# Patient Record
Sex: MALE | Race: WHITE | NOT HISPANIC OR LATINO | Employment: OTHER | URBAN - METROPOLITAN AREA
[De-identification: names, ages, dates, MRNs, and addresses within clinical notes are randomized per-mention and may not be internally consistent; named-entity substitution may affect disease eponyms.]

---

## 2017-02-01 ENCOUNTER — AMBULATORY - HEALTHEAST (OUTPATIENT)
Dept: CARDIOLOGY | Facility: CLINIC | Age: 62
End: 2017-02-01

## 2017-02-01 ENCOUNTER — TRANSFERRED RECORDS (OUTPATIENT)
Dept: HEALTH INFORMATION MANAGEMENT | Facility: CLINIC | Age: 62
End: 2017-02-01

## 2017-02-01 DIAGNOSIS — Z95.810 ICD (IMPLANTABLE CARDIOVERTER-DEFIBRILLATOR) IN PLACE: ICD-10-CM

## 2017-02-27 ENCOUNTER — TELEPHONE (OUTPATIENT)
Dept: FAMILY MEDICINE | Facility: CLINIC | Age: 62
End: 2017-02-27

## 2017-02-27 NOTE — TELEPHONE ENCOUNTER
[facility]      patient requesting a refill:y     Full Medication Name: atorvastation 40mg and clopidogrel 75mg    Dose: ...    Pharmacy confirmed as   [facility] - [place], [place] - [address]  [address]  [facility] [address]  Phone: [phone] Fax: [phone]  : Yes    Additional Comments: he need a refill     OK to leave a message on voice mail? Yes      Primary language: English      needed? No    Call taken on February 27, 2017 at 12:33 PM by Stephanie Meza

## 2017-02-28 DIAGNOSIS — I25.10 CORONARY ARTERY DISEASE INVOLVING NATIVE HEART WITHOUT ANGINA PECTORIS, UNSPECIFIED VESSEL OR LESION TYPE: ICD-10-CM

## 2017-02-28 DIAGNOSIS — I25.10 CORONARY ARTERY DISEASE INVOLVING NATIVE CORONARY ARTERY OF NATIVE HEART WITHOUT ANGINA PECTORIS: ICD-10-CM

## 2017-02-28 DIAGNOSIS — Z95.5 S/P CORONARY ARTERY STENT PLACEMENT: ICD-10-CM

## 2017-03-01 RX ORDER — ATORVASTATIN CALCIUM 40 MG/1
40 TABLET, FILM COATED ORAL DAILY
Qty: 90 TABLET | Refills: 3 | Status: SHIPPED | OUTPATIENT
Start: 2017-03-01 | End: 2017-12-18

## 2017-03-01 RX ORDER — CLOPIDOGREL BISULFATE 75 MG/1
75 TABLET ORAL DAILY
Qty: 90 TABLET | Refills: 3 | Status: SHIPPED | OUTPATIENT
Start: 2017-03-01 | End: 2018-03-07

## 2017-03-16 ENCOUNTER — TELEPHONE (OUTPATIENT)
Dept: FAMILY MEDICINE | Facility: CLINIC | Age: 62
End: 2017-03-16

## 2017-03-28 ENCOUNTER — COMMUNICATION - HEALTHEAST (OUTPATIENT)
Dept: CARDIOLOGY | Facility: CLINIC | Age: 62
End: 2017-03-28

## 2017-03-29 ENCOUNTER — COMMUNICATION - HEALTHEAST (OUTPATIENT)
Dept: CARDIOLOGY | Facility: CLINIC | Age: 62
End: 2017-03-29

## 2017-04-05 ENCOUNTER — COMMUNICATION - HEALTHEAST (OUTPATIENT)
Dept: CARDIOLOGY | Facility: CLINIC | Age: 62
End: 2017-04-05

## 2017-04-05 DIAGNOSIS — I42.9 CARDIOMYOPATHY (H): ICD-10-CM

## 2017-04-11 ENCOUNTER — OFFICE VISIT (OUTPATIENT)
Dept: FAMILY MEDICINE | Facility: CLINIC | Age: 62
End: 2017-04-11

## 2017-04-11 VITALS
OXYGEN SATURATION: 97 % | TEMPERATURE: 98 F | DIASTOLIC BLOOD PRESSURE: 75 MMHG | SYSTOLIC BLOOD PRESSURE: 124 MMHG | HEART RATE: 85 BPM | BODY MASS INDEX: 23.79 KG/M2 | HEIGHT: 68 IN | WEIGHT: 157 LBS

## 2017-04-11 DIAGNOSIS — N52.9 VASCULOGENIC ERECTILE DYSFUNCTION, UNSPECIFIED VASCULOGENIC ERECTILE DYSFUNCTION TYPE: Primary | ICD-10-CM

## 2017-04-11 DIAGNOSIS — K40.90 INGUINAL HERNIA WITHOUT OBSTRUCTION OR GANGRENE, RECURRENCE NOT SPECIFIED, UNSPECIFIED LATERALITY: ICD-10-CM

## 2017-04-11 RX ORDER — SILDENAFIL CITRATE 20 MG/1
TABLET ORAL
Qty: 30 TABLET | Refills: 1 | Status: SHIPPED | OUTPATIENT
Start: 2017-04-11 | End: 2017-04-11

## 2017-04-11 NOTE — PROGRESS NOTES
"There are no exam notes on file for this visit.  Chief Complaint   Patient presents with     RECHECK     follow up medication and hernia     Blood pressure 124/75, pulse 85, temperature 98  F (36.7  C), temperature source Oral, height 5' 8\" (172.7 cm), weight 157 lb (71.2 kg), SpO2 97 %.  SUBJECTIVE:  Patient is in today to f/u after his recent hernia surgery, review his medications, and to discuss erectile dysfunction.     Patient had hernia surgery and did well following the surgery; however, he had an episode of urinary retention.  They attempted to place a Zayas catheter and had some difficulty doing so.  They finally were able to pass the catheter with some discomfort on the patient's part.  He went home with a Zayas catheter, kept it in for one day then went back to the surgeon's office, and they removed it.  They didn't start any new medications.     Patient doesn't have the exact dates, but he believes that he has followup appointments with his Cardiology and Electrophysiology cardiologist.     Patient tells me that he wasn't able to purchase Viagra because it was too expensive for him.  The patient and I discussed obtaining a coupon, but nonetheless it is out of reach.  Patient brought with him a picture of generic Viagra and said this could be less expensive for him.  This was prescribed for him.     Patient otherwise feels that he has healed up well after his surgery.   He doesn't have problems with urination.  He doesn't have problems after surgery and things are healed well.     OBJECTIVE:  This is a well-nourished, well-developed male who is in no acute distress.  The patient has well-healed scars from the laparoscopic hernia repair.  I didn't detect evidence of direct or indirect hernia on examination.  Otherwise, examination is within normal limits.     ASSESSMENT:   1.  Erectile dysfunction.   2.  Inguinal hernia, status post repair.     PLAN:  We'll see the patient back as p.r.n.  We'll see him " back in one year or sooner than that if he has questions or concerns.  We'll f/u with Cardiology, both for f/u and for estimation of duration of therapy of the Plavix.         Vasculogenic erectile dysfunction, unspecified vasculogenic erectile dysfunction type    Inguinal hernia without obstruction or gangrene, recurrence not specified, unspecified laterality      The patient was actively involved in the decision making process, and all the questions were answered to their satisfaction prior to leaving.

## 2017-04-11 NOTE — MR AVS SNAPSHOT
After Visit Summary   2017    Inocente Rios    MRN: [id number]           Patient Information     Date Of Birth          1955        Visit Information        Provider Department      2017 2:10 PM Yung Bowen MD [facility]        Today's Diagnoses     Vasculogenic erectile dysfunction, unspecified vasculogenic erectile dysfunction type    -  1    Inguinal hernia without obstruction or gangrene, recurrence not specified, unspecified laterality           Follow-ups after your visit        Who to contact     Please call your clinic at [phone] to:    Ask questions about your health    Make or cancel appointments    Discuss your medicines    Learn about your test results    Speak to your doctor   If you have compliments or concerns about an experience at your clinic, or if you wish to file a complaint, please contact [facility] Patient Relations at [phone] or email us at [email or web address]         Additional Information About Your Visit        MyChart Information     [employer] is an electronic gateway that provides easy, online access to your medical records. With [employer], you can request a clinic appointment, read your test results, renew a prescription or communicate with your care team.     To sign up for [employer] visit the website at [email or web address]   You will be asked to enter the access code listed below, as well as some personal information. Please follow the directions to create your username and password.     Your access code is: [email or web address]  Expires: 2017  2:35 PM     Your access code will  in 90 days. If you need help or a new code, please contact your [facility] or call [phone] for assistance.        Care EveryWhere ID     This is your Care EveryWhere ID. This could be used by other organizations to access your [facility] medical records  [id number]        Your  "Vitals Were     Pulse Temperature Height Pulse Oximetry BMI (Body Mass Index)       85 98  F (36.7  C) (Oral) 5' 8\" (172.7 cm) 97% 23.87 kg/m2        Blood Pressure from Last 3 Encounters:   04/11/17 124/75   11/17/16 121/80   10/24/16 121/74    Weight from Last 3 Encounters:   04/11/17 157 lb (71.2 kg)   11/17/16 155 lb 6 oz (70.5 kg)   10/24/16 157 lb (71.2 kg)              Today, you had the following     No orders found for display       Primary Care Provider Office Phone # Fax #    Yung Bowen [phone]       [address] [address]        Thank you!     Thank you for choosing [facility]  for your care. Our goal is always to provide you with excellent care. Hearing back from our patients is one way we can continue to improve our services. Please take a few minutes to complete the written survey that you may receive in the mail after your visit with us. Thank you!             Your Updated Medication List - Protect others around you: Learn how to safely use, store and throw away your medicines at www.disposemymeds.org.          This list is accurate as of: 4/11/17 11:59 PM.  Always use your most recent med list.                   Brand Name Dispense Instructions for use    ASPIRIN PO      Take 81 mg by mouth       atorvastatin 40 MG tablet    LIPITOR    90 tablet    Take 1 tablet (40 mg) by mouth daily       clopidogrel 75 MG tablet    PLAVIX    90 tablet    Take 1 tablet (75 mg) by mouth daily       METOPROLOL SUCCINATE ER PO      Take 25 mg by mouth       sildenafil 20 MG tablet    REVATIO/VIAGRA    30 tablet    Use one to three tablets as needed. Never use with nitroglycerin, terazosin or doxazosin.         "

## 2017-04-12 ENCOUNTER — AMBULATORY - HEALTHEAST (OUTPATIENT)
Dept: CARDIOLOGY | Facility: CLINIC | Age: 62
End: 2017-04-12

## 2017-04-12 DIAGNOSIS — Z00.6 RESEARCH EXAM: ICD-10-CM

## 2017-04-12 DIAGNOSIS — Z95.810 ICD (IMPLANTABLE CARDIOVERTER-DEFIBRILLATOR) IN PLACE: ICD-10-CM

## 2017-04-12 LAB
ATRIAL RATE - MUSE: 64 BPM
DIASTOLIC BLOOD PRESSURE - MUSE: NORMAL MMHG
INTERPRETATION ECG - MUSE: NORMAL
P AXIS - MUSE: 54 DEGREES
PR INTERVAL - MUSE: 154 MS
QRS DURATION - MUSE: 78 MS
QT - MUSE: 384 MS
QTC - MUSE: 396 MS
R AXIS - MUSE: 68 DEGREES
SYSTOLIC BLOOD PRESSURE - MUSE: NORMAL MMHG
T AXIS - MUSE: 74 DEGREES
VENTRICULAR RATE- MUSE: 64 BPM

## 2017-04-12 ASSESSMENT — MIFFLIN-ST. JEOR: SCORE: 1473.9

## 2017-04-18 ENCOUNTER — TELEPHONE (OUTPATIENT)
Dept: FAMILY MEDICINE | Facility: CLINIC | Age: 62
End: 2017-04-18

## 2017-04-27 ENCOUNTER — TELEPHONE (OUTPATIENT)
Dept: FAMILY MEDICINE | Facility: CLINIC | Age: 62
End: 2017-04-27

## 2017-04-27 NOTE — TELEPHONE ENCOUNTER
[facility] phone call message- general phone call:    Reason for call: Re a PA for [employer] they would like to know the status of the PA.    Return call needed: Yes    OK to leave a message on voice mail? Yes    Primary language: English      needed? No    Call taken on April 27, 2017 at 2:38 PM by Stephanie Meza

## 2017-04-28 NOTE — TELEPHONE ENCOUNTER
D/w Dr Bowen.  He and Dr. Arrington decided not to pursue a PA because it would likely not be covered d/t indication of erectile dysfunction.  Instead, pt was given application for Viagra patient assistance program.  Relayed information to pharmacy.      Kisha Dominguez, Pharm.D.

## 2017-05-10 ENCOUNTER — AMBULATORY - HEALTHEAST (OUTPATIENT)
Dept: CARDIOLOGY | Facility: CLINIC | Age: 62
End: 2017-05-10

## 2017-05-10 DIAGNOSIS — Z95.810 ICD (IMPLANTABLE CARDIOVERTER-DEFIBRILLATOR) IN PLACE: ICD-10-CM

## 2017-05-10 LAB — HCC DEVICE COMMENTS: NORMAL

## 2017-05-11 ENCOUNTER — AMBULATORY - HEALTHEAST (OUTPATIENT)
Dept: CARDIOLOGY | Facility: CLINIC | Age: 62
End: 2017-05-11

## 2017-07-28 ENCOUNTER — ALLIED HEALTH/NURSE VISIT (OUTPATIENT)
Dept: FAMILY MEDICINE | Facility: CLINIC | Age: 62
End: 2017-07-28

## 2017-07-28 VITALS
BODY MASS INDEX: 23.87 KG/M2 | TEMPERATURE: 98.4 F | WEIGHT: 157 LBS | SYSTOLIC BLOOD PRESSURE: 119 MMHG | DIASTOLIC BLOOD PRESSURE: 78 MMHG

## 2017-07-28 DIAGNOSIS — Z23 NEED FOR VACCINATION: Primary | ICD-10-CM

## 2017-08-16 ENCOUNTER — AMBULATORY - HEALTHEAST (OUTPATIENT)
Dept: CARDIOLOGY | Facility: CLINIC | Age: 62
End: 2017-08-16

## 2017-08-16 DIAGNOSIS — Z95.810 ICD (IMPLANTABLE CARDIOVERTER-DEFIBRILLATOR) IN PLACE: ICD-10-CM

## 2017-08-16 LAB — HCC DEVICE COMMENTS: NORMAL

## 2017-10-13 ENCOUNTER — OFFICE VISIT (OUTPATIENT)
Dept: FAMILY MEDICINE | Facility: CLINIC | Age: 62
End: 2017-10-13

## 2017-10-13 ENCOUNTER — RECORDS - HEALTHEAST (OUTPATIENT)
Dept: ADMINISTRATIVE | Facility: OTHER | Age: 62
End: 2017-10-13

## 2017-10-13 ENCOUNTER — TRANSFERRED RECORDS (OUTPATIENT)
Dept: HEALTH INFORMATION MANAGEMENT | Facility: CLINIC | Age: 62
End: 2017-10-13

## 2017-10-13 ENCOUNTER — OFFICE VISIT - HEALTHEAST (OUTPATIENT)
Dept: CARDIOLOGY | Facility: CLINIC | Age: 62
End: 2017-10-13

## 2017-10-13 VITALS
WEIGHT: 156.13 LBS | SYSTOLIC BLOOD PRESSURE: 131 MMHG | OXYGEN SATURATION: 99 % | BODY MASS INDEX: 23.12 KG/M2 | HEIGHT: 69 IN | DIASTOLIC BLOOD PRESSURE: 82 MMHG | HEART RATE: 75 BPM | TEMPERATURE: 98.4 F

## 2017-10-13 DIAGNOSIS — I25.10 CORONARY ARTERY DISEASE DUE TO LIPID RICH PLAQUE: ICD-10-CM

## 2017-10-13 DIAGNOSIS — I73.00 RAYNAUD'S DISEASE WITHOUT GANGRENE: ICD-10-CM

## 2017-10-13 DIAGNOSIS — I80.01 THROMBOPHLEBITIS OF SUPERFICIAL VEINS OF RIGHT LOWER EXTREMITY: Primary | ICD-10-CM

## 2017-10-13 DIAGNOSIS — I25.83 CORONARY ARTERY DISEASE DUE TO LIPID RICH PLAQUE: ICD-10-CM

## 2017-10-13 DIAGNOSIS — Z23 NEED FOR PROPHYLACTIC VACCINATION AND INOCULATION AGAINST INFLUENZA: ICD-10-CM

## 2017-10-13 DIAGNOSIS — G47.30 OBSERVED SLEEP APNEA: ICD-10-CM

## 2017-10-13 DIAGNOSIS — I21.02 ST ELEVATION MYOCARDIAL INFARCTION INVOLVING LEFT ANTERIOR DESCENDING (LAD) CORONARY ARTERY (H): ICD-10-CM

## 2017-10-13 DIAGNOSIS — E78.00 PURE HYPERCHOLESTEROLEMIA: ICD-10-CM

## 2017-10-13 DIAGNOSIS — I83.891 VARICOSE VEINS OF LEG WITH SWELLING, RIGHT: ICD-10-CM

## 2017-10-13 RX ORDER — ALBUTEROL SULFATE 90 UG/1
2 AEROSOL, METERED RESPIRATORY (INHALATION)
COMMUNITY
End: 2020-03-13

## 2017-10-13 ASSESSMENT — MIFFLIN-ST. JEOR: SCORE: 1475.72

## 2017-10-13 NOTE — PATIENT INSTRUCTIONS
Your ultrasound results were very reassuring.   CONCLUSION:   1.  No deep venous thrombosis of the right lower extremity.  2.  RIGHT LEG: Incompetent GSV. The SSV is competent. Thrombosed superficial varicosities at the mid calf.    Options for your fluid build up    the best help comes from compression socks for the swelling    The vascular clinic to see what they have to offer you    The Lymphedema Clinic is another option to help with the swelling in your legs    Follow up with Dr. Bowen   Superficial Thrombophlebitis   The superficial veins are the veins near the surface of the skin. Superficial thrombophlebitis is a problem that occurs when one or more of these veins become inflamed (red, irritated, and swollen). This is most often because of a blood clot.  Causes  The problem may occur after injury to a vein. It may also occur after having an intravenous (IV) line placed. Other factors that can make the problem more likely include:    Varicose veins    Venous insufficiency    Bleeding disorders    Prolonged periods of rest and not moving around    IV drug abuse  Symptoms  Symptoms may appear in the affected area. They can include:    Pain    Tenderness    Redness    Warmth    Swelling    Hardening of the vein  In most cases, superficial thrombophlebitis resolves on its own with no problems. Treatment is focused on relieving symptoms.  Sometimes, there is a risk that the deep veins in the body may also be involved. This can lead to more serious problems. In such cases, further testing and treatments may be needed. Your healthcare provider can tell you more about this.  Home Care  To help relieve pain and swelling, you may be told to:    Apply heat or cold to the affected area. Do this for up to 10 minutes as often as directed.    Heat: Use a warm compress, such as a heating pad.    Cold: Use a cold compress, such as a cold pack or bag of ice wrapped in a thin towel.    Take nonsteroidal anti-inflammatory  drugs (NSAIDS), such as ibuprofen. In some cases, other pain medicines may be prescribed.    Keep the affected limb (arm or leg) raised above heart level as directed.    Wear elastic compression stockings or bandages as directed.    Avoid prolonged sitting or standing. Get up and walk often.  To help treat a blood clot, a blood thinner (anticoagulant) may be prescribed. If this is needed, be sure to take the medicine exactly as directed.  Follow-up care  Follow up with your healthcare provider as advised. If imaging tests are done, they will be reviewed by a doctor. You ll be told the results and any new findings that may affect your treatment.  When to seek medical advice  Call your healthcare provider right away if any of these occur:    Fever of 100.4 F (38 C) or higher, or as directed by your provider    Increasing pain, swelling, or tenderness in the affected area    Spreading warmth or redness in the affected area  Call 911  Call 911 right away if any of these occur:    Trouble breathing    Chest pain or discomfort that worsens with deep breathing or coughing    Coughing (may cough up blood)    Fast or irregular heartbeat    Sweating    Anxiety    Lightheadedness, dizziness, or fainting    Extreme confusion    Extreme drowsiness or trouble waking up    New pain in the chest, arm, shoulder, neck, or upper back  Date Last Reviewed: 9/21/2015 2000-2017 [employer] [facility]. [address] [place] [address], [place] [address]. All rights reserved. This information is not intended as a substitute for professional medical care. Always follow your healthcare professional's instructions.

## 2017-10-13 NOTE — MR AVS SNAPSHOT
After Visit Summary   10/13/2017    Inocente Rios    MRN: [id number]           Patient Information     Date Of Birth          1955        Visit Information        Provider Department      10/13/2017 1:50 PM Yung Bowen MD [facility]        Today's Diagnoses     Thrombophlebitis of superficial veins of right lower extremity    -  1    Need for prophylactic vaccination and inoculation against influenza          Care Instructions    Your ultrasound results were very reassuring.   CONCLUSION:   1.  No deep venous thrombosis of the right lower extremity.  2.  RIGHT LEG: Incompetent GSV. The SSV is competent. Thrombosed superficial varicosities at the mid calf.    Options for your fluid build up    the best help comes from compression socks for the swelling    The vascular clinic to see what they have to offer you    The Lymphedema Clinic is another option to help with the swelling in your legs    Follow up with Dr. Bowen   Superficial Thrombophlebitis   The superficial veins are the veins near the surface of the skin. Superficial thrombophlebitis is a problem that occurs when one or more of these veins become inflamed (red, irritated, and swollen). This is most often because of a blood clot.  Causes  The problem may occur after injury to a vein. It may also occur after having an intravenous (IV) line placed. Other factors that can make the problem more likely include:    Varicose veins    Venous insufficiency    Bleeding disorders    Prolonged periods of rest and not moving around    IV drug abuse  Symptoms  Symptoms may appear in the affected area. They can include:    Pain    Tenderness    Redness    Warmth    Swelling    Hardening of the vein  In most cases, superficial thrombophlebitis resolves on its own with no problems. Treatment is focused on relieving symptoms.  Sometimes, there is a risk that the deep veins in the body may also be involved. This can lead to more serious  problems. In such cases, further testing and treatments may be needed. Your healthcare provider can tell you more about this.  Home Care  To help relieve pain and swelling, you may be told to:    Apply heat or cold to the affected area. Do this for up to 10 minutes as often as directed.    Heat: Use a warm compress, such as a heating pad.    Cold: Use a cold compress, such as a cold pack or bag of ice wrapped in a thin towel.    Take nonsteroidal anti-inflammatory drugs (NSAIDS), such as ibuprofen. In some cases, other pain medicines may be prescribed.    Keep the affected limb (arm or leg) raised above heart level as directed.    Wear elastic compression stockings or bandages as directed.    Avoid prolonged sitting or standing. Get up and walk often.  To help treat a blood clot, a blood thinner (anticoagulant) may be prescribed. If this is needed, be sure to take the medicine exactly as directed.  Follow-up care  Follow up with your healthcare provider as advised. If imaging tests are done, they will be reviewed by a doctor. You ll be told the results and any new findings that may affect your treatment.  When to seek medical advice  Call your healthcare provider right away if any of these occur:    Fever of 100.4 F (38 C) or higher, or as directed by your provider    Increasing pain, swelling, or tenderness in the affected area    Spreading warmth or redness in the affected area  Call 911  Call 911 right away if any of these occur:    Trouble breathing    Chest pain or discomfort that worsens with deep breathing or coughing    Coughing (may cough up blood)    Fast or irregular heartbeat    Sweating    Anxiety    Lightheadedness, dizziness, or fainting    Extreme confusion    Extreme drowsiness or trouble waking up    New pain in the chest, arm, shoulder, neck, or upper back  Date Last Reviewed: 9/21/2015 2000-2017 [employer]. [address] [place] [address], [place] [address]. All rights reserved. This  "information is not intended as a substitute for professional medical care. Always follow your healthcare professional's instructions.                Follow-ups after your visit        Who to contact     Please call your clinic at [phone] to:    Ask questions about your health    Make or cancel appointments    Discuss your medicines    Learn about your test results    Speak to your doctor   If you have compliments or concerns about an experience at your clinic, or if you wish to file a complaint, please contact [facility] Patient Relations at [phone] or email us at [email or web address]         Additional Information About Your Visit        TermSynchart Information     [employer] is an electronic gateway that provides easy, online access to your medical records. With [employer], you can request a clinic appointment, read your test results, renew a prescription or communicate with your care team.     To sign up for [employer] visit the website at [email or web address]   You will be asked to enter the access code listed below, as well as some personal information. Please follow the directions to create your username and password.     Your access code is: [email or web address]  Expires: 10/26/2017  1:40 PM     Your access code will  in 90 days. If you need help or a new code, please contact your [facility] or call [phone] for assistance.        Care EveryWhere ID     This is your Care EveryWhere ID. This could be used by other organizations to access your [facility] medical records  [id number]        Your Vitals Were     Pulse Temperature Height Pulse Oximetry BMI (Body Mass Index)       75 98.4  F (36.9  C) (Oral) 5' 8.5\" (174 cm) 99% 23.39 kg/m2        Blood Pressure from Last 3 Encounters:   10/13/17 131/82   17 119/78   17 124/75    Weight from Last 3 Encounters:   10/13/17 156 lb 2 oz (70.8 kg)   17 157 lb (71.2 kg)   17 " 157 lb (71.2 kg)              We Performed the Following     ADMIN VACCINE, INITIAL     FLU VAC QUADRIVALENT SPLIT VIRUS IM 0.25 mL dosage          Today's Medication Changes          These changes are accurate as of: 10/13/17  2:21 PM.  If you have any questions, ask your nurse or doctor.               Start taking these medicines.        Dose/Directions    order for DME   Used for:  Thrombophlebitis of superficial veins of right lower extremity   Started by:  Yung Bowen MD [employer]   Quantity:  1 Units   Refills:  1            Where to get your medicines      Some of these will need a paper prescription and others can be bought over the counter.  Ask your nurse if you have questions.     Bring a paper prescription for each of these medications     order for DME                Primary Care Provider Office Phone # Fax #    Yung Bowen [phone]       [address] [address]        Equal Access to Services     [facility] Center[facility] : Hadii lindy ku hadasho Soomaali, waaxda luqadaha, qaybta kaalmada adeegyada, daniel mireles hayaablake campbell . So [facility] [phone].    ATENCIÓN: Si habla español, tiene a call disposición servicios gratuitos de asistencia lingüística. Llame al [phone].    We comply with applicable federal civil rights laws and [place] laws. We do not discriminate on the basis of race, color, national origin, age, disability, sex, sexual orientation, or gender identity.            Thank you!     Thank you for choosing [facility]  for your care. Our goal is always to provide you with excellent care. Hearing back from our patients is one way we can continue to improve our services. Please take a few minutes to complete the written survey that you may receive in the mail after your visit with us. Thank you!             Your Updated Medication List - Protect others around you: Learn how to safely use, store and throw away  your medicines at www.disposemymeds.org.          This list is accurate as of: 10/13/17  2:21 PM.  Always use your most recent med list.                   Brand Name Dispense Instructions for use Diagnosis    albuterol 108 (90 BASE) MCG/ACT Inhaler    PROAIR HFA/PROVENTIL HFA/VENTOLIN HFA     Inhale 2 puffs into the lungs        ASPIRIN PO      Take 81 mg by mouth        atorvastatin 40 MG tablet    LIPITOR    90 tablet    Take 1 tablet (40 mg) by mouth daily    Coronary artery disease involving native heart without angina pectoris, unspecified vessel or lesion type       clopidogrel 75 MG tablet    PLAVIX    90 tablet    Take 1 tablet (75 mg) by mouth daily    S/P coronary artery stent placement, Coronary artery disease involving native coronary artery of native heart without angina pectoris       METOPROLOL SUCCINATE ER PO      Take 25 mg by mouth        order for DME     1 Units    RIOS Stockings    Thrombophlebitis of superficial veins of right lower extremity       sildenafil 20 MG tablet    REVATIO    30 tablet    Use one to three tablets as needed. Never use with nitroglycerin, terazosin or doxazosin.    Erectile dysfunction due to arterial insufficiency

## 2017-10-13 NOTE — NURSING NOTE
Inocente Rios      1.  Has the patient received the information for the influenza vaccine? YES    2.  Does the patient have any of the following contraindications?     Allergy to eggs? No     Allergic reaction to previous influenza vaccines? No     Any other problems to previous influenza vaccines? No     Paralyzed by Guillain-Montgomery syndrome? No     Currently pregnant? NO     Current moderate or severe illness? No    Vaccination given by Divina Jessica CMA.  Recorded by Divina Jessica

## 2017-10-16 NOTE — PROGRESS NOTES
"Nursing Notes:   Divina Jessica CMA  10/13/2017  1:56 PM  Signed  Inocente Rios      1.  Has the patient received the information for the influenza vaccine? YES    2.  Does the patient have any of the following contraindications?     Allergy to eggs? No     Allergic reaction to previous influenza vaccines? No     Any other problems to previous influenza vaccines? No     Paralyzed by Guillain-Pineland syndrome? No     Currently pregnant? NO     Current moderate or severe illness? No    Vaccination given by Divina Jessica CMA.  Recorded by Divina Jessica    Chief Complaint   Patient presents with     Mass     right lower leg - swollen - denies redness/warmth - onset about 3 week - no long trips prior to symptoms     Blood pressure 131/82, pulse 75, temperature 98.4  F (36.9  C), temperature source Oral, height 5' 8.5\" (174 cm), weight 156 lb 2 oz (70.8 kg), SpO2 99 %.  Patient comes in today for evaluation of his right leg.    Fortuitously, the patient had a an appointment with cardiology earlier today. As he saw the cardiologist, he discussed his problem with his right lower leg. Etiology ordered a venous ultrasound and asked the patient to follow-up with me. Both the results of the ultrasound and the cardiologist note are reviewed in care everywhere.    The patient has had a problem with her gross veins in his that leg for quite some time. More recently he has noticed a firm nodule in this area. He has not had any new medications. No prolonged immobilization. No recent travel. He does not notice edema of his foot but rather localized swelling in the right calf area. He has not had skin changes.    Objective: This is a well-nourished, well-developed, male in no acute distress. Vital signs are as noted above, and are within normal limits. Termination of his right leg shows no pedal edema. He has good posterior tibial and dorsalis pedis pulses. In the medial mid calf area, he has a localized fluctuant swelling " consistent with varicosities. On the inferior anterior aspect of this there is a firm ridge of tissue. Please see results of ultrasound, but those results show no DVT but a superficial thrombophlebitis.    Assessment: #1 superficial thrombophlebitis of right lower extremity #2 desire for influenza immunization #3 varicosities of right lower extremity    Plan: We discussed treatment of superficial thrombophlebitis. The patient is currently asymptomatic other than the firm feeling in the skin. Will not use anti-inflammatories at this point, especially in view of his Plavix and aspirin use.    I discussed with the patient signs of progression to DVT, including development of edema of his right foot.    We discussed therapies for treatment of varicosities. We discussed support stockings, he was seen at the vascular center for question about obliteration of the varicosities, and a visit to the lymphedema clinic for discussion of wraps and occupational therapy. The patient elects to start with RIOS stockings.    He will call or return to clinic I can be of further assistance or if he has any new concerning symptoms area    Thrombophlebitis of superficial veins of right lower extremity  -     order for DME; RIOS Stockings    Need for prophylactic vaccination and inoculation against influenza  -     ADMIN VACCINE, INITIAL  -     FLU VAC QUADRIVALENT SPLIT VIRUS IM 0.25 mL dosage    The patient was actively involved in the decision making process, and all the questions were answered to their satisfaction prior to leaving.

## 2017-10-19 ENCOUNTER — HOSPITAL ENCOUNTER (OUTPATIENT)
Dept: CARDIOLOGY | Facility: CLINIC | Age: 62
Discharge: HOME | End: 2017-10-19
Attending: INTERNAL MEDICINE

## 2017-10-19 DIAGNOSIS — I25.10 CORONARY ARTERY DISEASE DUE TO LIPID RICH PLAQUE: ICD-10-CM

## 2017-10-19 DIAGNOSIS — I21.02 ST ELEVATION MYOCARDIAL INFARCTION INVOLVING LEFT ANTERIOR DESCENDING (LAD) CORONARY ARTERY (H): ICD-10-CM

## 2017-10-19 DIAGNOSIS — I25.83 CORONARY ARTERY DISEASE DUE TO LIPID RICH PLAQUE: ICD-10-CM

## 2017-10-20 LAB
CV STRESS CURRENT BP HE: NORMAL
CV STRESS CURRENT HR HE: 100
CV STRESS CURRENT HR HE: 101
CV STRESS CURRENT HR HE: 105
CV STRESS CURRENT HR HE: 105
CV STRESS CURRENT HR HE: 107
CV STRESS CURRENT HR HE: 111
CV STRESS CURRENT HR HE: 111
CV STRESS CURRENT HR HE: 113
CV STRESS CURRENT HR HE: 115
CV STRESS CURRENT HR HE: 117
CV STRESS CURRENT HR HE: 118
CV STRESS CURRENT HR HE: 121
CV STRESS CURRENT HR HE: 129
CV STRESS CURRENT HR HE: 129
CV STRESS CURRENT HR HE: 130
CV STRESS CURRENT HR HE: 131
CV STRESS CURRENT HR HE: 134
CV STRESS CURRENT HR HE: 142
CV STRESS CURRENT HR HE: 151
CV STRESS CURRENT HR HE: 151
CV STRESS CURRENT HR HE: 153
CV STRESS CURRENT HR HE: 154
CV STRESS CURRENT HR HE: 159
CV STRESS CURRENT HR HE: 159
CV STRESS CURRENT HR HE: 164
CV STRESS CURRENT HR HE: 166
CV STRESS CURRENT HR HE: 166
CV STRESS CURRENT HR HE: 89
CV STRESS CURRENT HR HE: 97
CV STRESS CURRENT HR HE: 98
CV STRESS CURRENT HR HE: 99
CV STRESS DEVIATION TIME HE: NORMAL
CV STRESS ECHO PERCENT HR HE: NORMAL
CV STRESS EXERCISE STAGE HE: NORMAL
CV STRESS FINAL RESTING BP HE: NORMAL
CV STRESS FINAL RESTING HR HE: 101
CV STRESS MAX HR HE: 167
CV STRESS MAX TREADMILL GRADE HE: 16
CV STRESS MAX TREADMILL SPEED HE: 4.2
CV STRESS PEAK DIA BP HE: NORMAL
CV STRESS PEAK SYS BP HE: NORMAL
CV STRESS PHASE HE: NORMAL
CV STRESS PROTOCOL HE: NORMAL
CV STRESS RESTING PT POSITION HE: NORMAL
CV STRESS RESTING PT POSITION HE: NORMAL
CV STRESS ST DEVIATION AMOUNT HE: NORMAL
CV STRESS ST DEVIATION ELEVATION HE: NORMAL
CV STRESS ST EVELATION AMOUNT HE: NORMAL
CV STRESS TEST TYPE HE: NORMAL
CV STRESS TOTAL STAGE TIME MIN 1 HE: NORMAL
ECHO EJECTION FRACTION ESTIMATED: 60 %
STRESS ECHO BASELINE BP: NORMAL
STRESS ECHO BASELINE HR: 96
STRESS ECHO CALCULATED PERCENT HR: 106 %
STRESS ECHO LAST STRESS BP: NORMAL
STRESS ECHO LAST STRESS HR: 166
STRESS ECHO POST ESTIMATED WORKLOAD: 12.1
STRESS ECHO POST EXERCISE DUR MIN: 10
STRESS ECHO POST EXERCISE DUR SEC: 30
STRESS ECHO TARGET HR: 134

## 2017-11-08 ENCOUNTER — AMBULATORY - HEALTHEAST (OUTPATIENT)
Dept: CARDIOLOGY | Facility: CLINIC | Age: 62
End: 2017-11-08

## 2017-11-08 ENCOUNTER — TRANSFERRED RECORDS (OUTPATIENT)
Dept: HEALTH INFORMATION MANAGEMENT | Facility: CLINIC | Age: 62
End: 2017-11-08

## 2017-11-08 DIAGNOSIS — I25.10 CORONARY ARTERY DISEASE DUE TO LIPID RICH PLAQUE: ICD-10-CM

## 2017-11-08 DIAGNOSIS — I21.02 ST ELEVATION MYOCARDIAL INFARCTION INVOLVING LEFT ANTERIOR DESCENDING (LAD) CORONARY ARTERY (H): ICD-10-CM

## 2017-11-08 DIAGNOSIS — Z95.810 ICD (IMPLANTABLE CARDIOVERTER-DEFIBRILLATOR) IN PLACE: ICD-10-CM

## 2017-11-08 DIAGNOSIS — I25.5 ISCHEMIC CARDIOMYOPATHY: ICD-10-CM

## 2017-11-08 DIAGNOSIS — I25.83 CORONARY ARTERY DISEASE DUE TO LIPID RICH PLAQUE: ICD-10-CM

## 2017-11-08 LAB
CHOLEST SERPL-MCNC: 153 MG/DL
FASTING STATUS PATIENT QL REPORTED: YES
HCC DEVICE COMMENTS: NORMAL
HDLC SERPL-MCNC: 59 MG/DL
LDLC SERPL CALC-MCNC: 83 MG/DL
TRIGL SERPL-MCNC: 55 MG/DL

## 2017-11-08 ASSESSMENT — MIFFLIN-ST. JEOR: SCORE: 1469.37

## 2017-11-10 ENCOUNTER — COMMUNICATION - HEALTHEAST (OUTPATIENT)
Dept: CARDIOLOGY | Facility: CLINIC | Age: 62
End: 2017-11-10

## 2017-12-18 DIAGNOSIS — I25.10 CORONARY ARTERY DISEASE INVOLVING NATIVE HEART WITHOUT ANGINA PECTORIS, UNSPECIFIED VESSEL OR LESION TYPE: ICD-10-CM

## 2017-12-19 RX ORDER — ATORVASTATIN CALCIUM 40 MG/1
40 TABLET, FILM COATED ORAL DAILY
Qty: 90 TABLET | Refills: 3 | Status: SHIPPED | OUTPATIENT
Start: 2017-12-19 | End: 2019-01-24

## 2018-01-13 ENCOUNTER — COMMUNICATION - HEALTHEAST (OUTPATIENT)
Dept: CARDIOLOGY | Facility: CLINIC | Age: 63
End: 2018-01-13

## 2018-01-13 DIAGNOSIS — I42.9 CARDIOMYOPATHY (H): ICD-10-CM

## 2018-02-02 ENCOUNTER — OFFICE VISIT (OUTPATIENT)
Dept: FAMILY MEDICINE | Facility: CLINIC | Age: 63
End: 2018-02-02
Payer: COMMERCIAL

## 2018-02-02 VITALS
TEMPERATURE: 97.6 F | RESPIRATION RATE: 16 BRPM | OXYGEN SATURATION: 99 % | DIASTOLIC BLOOD PRESSURE: 81 MMHG | SYSTOLIC BLOOD PRESSURE: 130 MMHG | WEIGHT: 162.2 LBS | BODY MASS INDEX: 24.3 KG/M2 | HEART RATE: 67 BPM

## 2018-02-02 DIAGNOSIS — H93.13 TINNITUS, BILATERAL: Primary | ICD-10-CM

## 2018-02-02 DIAGNOSIS — H91.93 DECREASED HEARING OF BOTH EARS: ICD-10-CM

## 2018-02-02 NOTE — MR AVS SNAPSHOT
After Visit Summary   2/2/2018    Inocente Rios    MRN: [id number]           Patient Information     Date Of Birth          1955        Visit Information        Provider Department      2/2/2018 8:00 AM Yung Bowen MD [facility]        Today's Diagnoses     Tinnitus, bilateral    -  1      Care Instructions      Tinnitus (Ringing in the Ears)     Treatment may include maskers and hearing aids.     Tinnitus is the term for a noise in your ear not caused by an outside sound. The noise might be a ringing, clicking, hiss, or roar. It can vary in pitch and may be soft or quite loud. For some people, tinnitus is a minor nuisance. But for others, the noise can make it hard to hear, work, and even sleep. When tinnitus can't be cured, a number of treatments may offer relief.  What causes tinnitus?  Loud noises, hearing loss, and ear wax can cause tinnitus. So can certain medicines. Large amounts of aspirin or caffeine are sometimes to blame. In many cases, the exact cause of tinnitus is unknown.  How is tinnitus treated?  Identifying and removing the cause is the best way to treat tinnitus. For that reason, your healthcare provider may refer you to an otolaryngologist (ear, nose, and throat doctor). Your hearing may also be checked by an audiologist (hearing specialist). If you have hearing loss, wearing a hearing aid may help your tinnitus. When the cause can't be found, the tinnitus itself may be treated. Some of the treatments are listed below, and your healthcare provider can tell you more about them:    Maskers are small devices that look like hearing aids. They emit a pleasant sound that helps cover up the ringing in your ears. Hearing aids and maskers are sometimes used together.    Medicines that treat anxiety and depression may ease tinnitus in some people.    Hypnosis or relaxation therapy may help head noise seem less severe.    Tinnitus retraining therapy combines counseling  and maskers. Both can help take your mind off the tinnitus.  For more information    American Speech-Hearing-Language Association [phone] www.marisa.org    American Tinnitus Association [phone] www.pili.org    National [place] on Deafness and other Communication Disorders 990-329-1937 www.nidcd.nih.gov   Date Last Reviewed: 7/1/2016 2000-2017 [employer]. [address] [place] [address]. All rights reserved. This information is not intended as a substitute for professional medical care. Always follow your healthcare professional's instructions.                Follow-ups after your visit        Additional Services     OTOLARYNGOLOGY REFERRAL       Patient to stop at the [facility]    Reason for Referral: Bilateral tinnitus     needed: No  Language: English    May leave message on voicemail: Yes    ([place] Only) Referral should be tracked (Yes/No)?                  Future tests that were ordered for you today     Open Future Orders        Priority Expected Expires Ordered    OTOLARYNGOLOGY REFERRAL Routine  2/2/2019 2/2/2018            Who to contact     Please call your clinic at [phone] to:    Ask questions about your health    Make or cancel appointments    Discuss your medicines    Learn about your test results    Speak to your doctor   If you have compliments or concerns about an experience at your clinic, or if you wish to file a complaint, please contact [facility] Patient Relations at [phone] or email us at [email or web address]         Additional Information About Your Visit        [email or web address] Information     [employer] is an electronic gateway that provides easy, online access to your medical records. With [employer]Element, you can request a clinic appointment, read your test results, renew a prescription or communicate with your care team.     To sign up for [employer] visit the website at [email or web address]   You will be  asked to enter the access code listed below, as well as some personal information. Please follow the directions to create your username and password.     Your access code is: [email or web address]  Expires: 5/3/2018  8:18 AM     Your access code will  in 90 days. If you need help or a new code, please contact your [facility] or call [phone] for assistance.        Care EveryWhere ID     This is your Care EveryWhere ID. This could be used by other organizations to access your [place] medical records  [id number]        Your Vitals Were     Pulse Temperature Respirations Pulse Oximetry BMI (Body Mass Index)       67 97.6  F (36.4  C) (Oral) 16 99% 24.3 kg/m2        Blood Pressure from Last 3 Encounters:   18 130/81   10/13/17 131/82   17 119/78    Weight from Last 3 Encounters:   18 162 lb 3.2 oz (73.6 kg)   10/13/17 156 lb 2 oz (70.8 kg)   17 157 lb (71.2 kg)               Primary Care Provider Office Phone # Fax #    Yung Bowen [phone]       [place] [address]        Equal Access to Services     MAURA CALLEJAS AH: Hadii aad ku hadasho Soomaali, waaxda luqadaha, qaybta kaalmada adeegyada, waxay idiin hayaylsian susan colbert. So [facility] [phone].    ATENCIÓN: Si habla español, tiene a call disposición servicios gratuitos de asistencia lingüística. Llame al [phone].    We comply with applicable federal civil rights laws and [place] laws. We do not discriminate on the basis of race, color, national origin, age, disability, sex, sexual orientation, or gender identity.            Thank you!     Thank you for choosing [facility]  for your care. Our goal is always to provide you with excellent care. Hearing back from our patients is one way we can continue to improve our services. Please take a few minutes to complete the written survey that you may receive in the mail after your visit with  us. Thank you!             Your Updated Medication List - Protect others around you: Learn how to safely use, store and throw away your medicines at www.disposemymeds.org.          This list is accurate as of 2/2/18  8:18 AM.  Always use your most recent med list.                   Brand Name Dispense Instructions for use Diagnosis    albuterol 108 (90 BASE) MCG/ACT Inhaler    PROAIR HFA/PROVENTIL HFA/VENTOLIN HFA     Inhale 2 puffs into the lungs        ASPIRIN PO      Take 81 mg by mouth        atorvastatin 40 MG tablet    LIPITOR    90 tablet    Take 1 tablet (40 mg) by mouth daily    Coronary artery disease involving native heart without angina pectoris, unspecified vessel or lesion type       clopidogrel 75 MG tablet    PLAVIX    90 tablet    Take 1 tablet (75 mg) by mouth daily    S/P coronary artery stent placement, Coronary artery disease involving native coronary artery of native heart without angina pectoris       METOPROLOL SUCCINATE ER PO      Take 25 mg by mouth        order for DME     1 Units    RIOS Stockings    Thrombophlebitis of superficial veins of right lower extremity       sildenafil 20 MG tablet    REVATIO    30 tablet    Use one to three tablets as needed. Never use with nitroglycerin, terazosin or doxazosin.    Erectile dysfunction due to arterial insufficiency

## 2018-02-02 NOTE — PATIENT INSTRUCTIONS
Tinnitus (Ringing in the Ears)     Treatment may include maskers and hearing aids.     Tinnitus is the term for a noise in your ear not caused by an outside sound. The noise might be a ringing, clicking, hiss, or roar. It can vary in pitch and may be soft or quite loud. For some people, tinnitus is a minor nuisance. But for others, the noise can make it hard to hear, work, and even sleep. When tinnitus can't be cured, a number of treatments may offer relief.  What causes tinnitus?  Loud noises, hearing loss, and ear wax can cause tinnitus. So can certain medicines. Large amounts of aspirin or caffeine are sometimes to blame. In many cases, the exact cause of tinnitus is unknown.  How is tinnitus treated?  Identifying and removing the cause is the best way to treat tinnitus. For that reason, your healthcare provider may refer you to an otolaryngologist (ear, nose, and throat doctor). Your hearing may also be checked by an audiologist (hearing specialist). If you have hearing loss, wearing a hearing aid may help your tinnitus. When the cause can't be found, the tinnitus itself may be treated. Some of the treatments are listed below, and your healthcare provider can tell you more about them:    Maskers are small devices that look like hearing aids. They emit a pleasant sound that helps cover up the ringing in your ears. Hearing aids and maskers are sometimes used together.    Medicines that treat anxiety and depression may ease tinnitus in some people.    Hypnosis or relaxation therapy may help head noise seem less severe.    Tinnitus retraining therapy combines counseling and maskers. Both can help take your mind off the tinnitus.  For more information    American Speech-Hearing-Language Association [phone] www.marisa.org    American Tinnitus Association [phone] www.pili.org    National [place] on Deafness and other Communication Disorders 882-383-9529 www.nidcd.nih.gov   Date Last Reviewed: 7/1/2016     8620-9837 [employer]. [address] [place] [address], [place] [address]. All rights reserved. This information is not intended as a substitute for professional medical care. Always follow your healthcare professional's instructions.      [facility]  [address] [place] [address]  ph: [phone]  fax: [phone]    Appointment  Date:2/8/18  Time:8:50am arrival    Please contact the above clinic if you need to cancel or reschedule. Feel free to contact me with any questions. Thanks!    Qi  Care Coordinator  [phone]

## 2018-02-02 NOTE — PROGRESS NOTES
There are no exam notes on file for this visit.  Chief Complaint   Patient presents with     Tinnitus     for a few weeks     Blood pressure 130/81, pulse 67, temperature 97.6  F (36.4  C), temperature source Oral, resp. rate 16, weight 162 lb 3.2 oz (73.6 kg), SpO2 99 %.      Patient comes in today with ringing in the ears.  Patient states this is been going on for the past several weeks.  He also notes a decrease in the hearing.  He is not as much bothered by the decrease in hearing as his wife is.  The ringing is not progressive but has remained relatively constant over the past several weeks.    The patient does have exposure to loud noises with hunting with his father, but not since then.  The patient is very aggressive about using earplugs for the noise suppression.    Objective: This is a well-nourished, well-developed, male who is in no acute distress.  His canals are clear and TMs are within normal limits bilaterally.  His pharynx is not injected, and there is no exudate.  Neck is supple, and there is no cervical adenopathy.    Assessment: Tinnitus    Plan: Discussed with the patient symptomatic treatment including masking.  Please see patient information sheet.    Patient is referred to otolaryngology for both tinnitus and for decreased hearing.    Tinnitus, bilateral  -     OTOLARYNGOLOGY REFERRAL; Future    Decreased hearing of both ears  -     OTOLARYNGOLOGY REFERRAL; Future    The patient was actively involved in the decision making process, and all the questions were answered to their satisfaction prior to leaving.

## 2018-02-12 ENCOUNTER — AMBULATORY - HEALTHEAST (OUTPATIENT)
Dept: CARDIOLOGY | Facility: CLINIC | Age: 63
End: 2018-02-12

## 2018-02-12 DIAGNOSIS — Z95.810 ICD (IMPLANTABLE CARDIOVERTER-DEFIBRILLATOR) IN PLACE: ICD-10-CM

## 2018-02-12 LAB — HCC DEVICE COMMENTS: NORMAL

## 2018-03-07 DIAGNOSIS — I25.10 CORONARY ARTERY DISEASE INVOLVING NATIVE CORONARY ARTERY OF NATIVE HEART WITHOUT ANGINA PECTORIS: ICD-10-CM

## 2018-03-07 DIAGNOSIS — Z95.5 S/P CORONARY ARTERY STENT PLACEMENT: ICD-10-CM

## 2018-03-07 RX ORDER — CLOPIDOGREL BISULFATE 75 MG/1
75 TABLET ORAL DAILY
Qty: 90 TABLET | Refills: 3 | Status: SHIPPED | OUTPATIENT
Start: 2018-03-07 | End: 2018-12-21

## 2018-05-31 ENCOUNTER — AMBULATORY - HEALTHEAST (OUTPATIENT)
Dept: CARDIOLOGY | Facility: CLINIC | Age: 63
End: 2018-05-31

## 2018-05-31 DIAGNOSIS — Z95.810 ICD (IMPLANTABLE CARDIOVERTER-DEFIBRILLATOR) IN PLACE: ICD-10-CM

## 2018-05-31 LAB
HCC DEVICE COMMENTS: NORMAL
HCC DEVICE IMPLANTING PROVIDER: NORMAL
HCC DEVICE MANUFACTURE: NORMAL
HCC DEVICE MODEL: NORMAL
HCC DEVICE SERIAL NUMBER: NORMAL
HCC DEVICE TYPE: NORMAL

## 2018-06-18 ENCOUNTER — COMMUNICATION - HEALTHEAST (OUTPATIENT)
Dept: CARDIOLOGY | Facility: CLINIC | Age: 63
End: 2018-06-18

## 2018-06-20 ENCOUNTER — TRANSFERRED RECORDS (OUTPATIENT)
Dept: HEALTH INFORMATION MANAGEMENT | Facility: CLINIC | Age: 63
End: 2018-06-20

## 2018-09-06 ENCOUNTER — AMBULATORY - HEALTHEAST (OUTPATIENT)
Dept: CARDIOLOGY | Facility: CLINIC | Age: 63
End: 2018-09-06

## 2018-09-06 DIAGNOSIS — Z95.810 ICD (IMPLANTABLE CARDIOVERTER-DEFIBRILLATOR) IN PLACE: ICD-10-CM

## 2018-10-24 ENCOUNTER — COMMUNICATION - HEALTHEAST (OUTPATIENT)
Dept: CARDIOLOGY | Facility: CLINIC | Age: 63
End: 2018-10-24

## 2018-10-24 DIAGNOSIS — I42.9 CARDIOMYOPATHY (H): ICD-10-CM

## 2018-11-07 ENCOUNTER — ALLIED HEALTH/NURSE VISIT (OUTPATIENT)
Dept: FAMILY MEDICINE | Facility: CLINIC | Age: 63
End: 2018-11-07
Payer: COMMERCIAL

## 2018-11-07 VITALS — TEMPERATURE: 96.4 F

## 2018-11-07 DIAGNOSIS — Z23 NEED FOR VACCINATION: Primary | ICD-10-CM

## 2018-11-07 NOTE — MR AVS SNAPSHOT
After Visit Summary   2018    Inocente Rios    MRN: [id number]           Patient Information     Date Of Birth          1955        Visit Information        Provider Department      2018 1:30 PM [facility]        Today's Diagnoses     Need for vaccination    -  1       Follow-ups after your visit        Who to contact     Please call your clinic at [phone] to:    Ask questions about your health    Make or cancel appointments    Discuss your medicines    Learn about your test results    Speak to your doctor            Additional Information About Your Visit        MyChart Information     [employer] is an electronic gateway that provides easy, online access to your medical records. With [employer], you can request a clinic appointment, read your test results, renew a prescription or communicate with your care team.     To sign up for [employer] visit the website at [email or web address]   You will be asked to enter the access code listed below, as well as some personal information. Please follow the directions to create your username and password.     Your access code is: [email or web address]  Expires: 2019  1:45 PM     Your access code will  in 90 days. If you need help or a new code, please contact your [facility] or call [phone] for assistance.        Care EveryWhere ID     This is your Care EveryWhere ID. This could be used by other organizations to access your [place] medical records  [id number]        Your Vitals Were     Temperature                   96.4  F (35.8  C) (Tympanic)            Blood Pressure from Last 3 Encounters:   18 130/81   10/13/17 131/82   17 119/78    Weight from Last 3 Encounters:   18 162 lb 3.2 oz (73.6 kg)   10/13/17 156 lb 2 oz (70.8 kg)   17 157 lb (71.2 kg)              We Performed the Following     ADMIN VACCINE, INITIAL     FLU VAC QUADRIVLENT SPLIT VIRUS IM  0.5ml dosage        Primary Care Provider Office Phone # Fax #    Yung Bowen [phone]       [address] [place] [address]        Equal Access to Services     AUGUSTINEANAHI BRITTA : Hadii lindy ku nicoleo Somarcelaali, waaxda luqadaha, qaybta kaalmada bee, daniel levyblake sayra. So [facility] [phone].    ATENCIÓN: Si habla español, tiene a call disposición servicios gratuitos de asistencia lingüística. Llame al [phone].    We comply with applicable federal civil rights laws and [place] laws. We do not discriminate on the basis of race, color, national origin, age, disability, sex, sexual orientation, or gender identity.            Thank you!     Thank you for choosing [facility]  for your care. Our goal is always to provide you with excellent care. Hearing back from our patients is one way we can continue to improve our services. Please take a few minutes to complete the written survey that you may receive in the mail after your visit with us. Thank you!             Your Updated Medication List - Protect others around you: Learn how to safely use, store and throw away your medicines at www.disposemymeds.org.          This list is accurate as of 11/7/18  1:45 PM.  Always use your most recent med list.                   Brand Name Dispense Instructions for use Diagnosis    albuterol 108 (90 Base) MCG/ACT inhaler    PROAIR HFA/PROVENTIL HFA/VENTOLIN HFA     Inhale 2 puffs into the lungs        ASPIRIN PO      Take 81 mg by mouth        atorvastatin 40 MG tablet    LIPITOR    90 tablet    Take 1 tablet (40 mg) by mouth daily    Coronary artery disease involving native heart without angina pectoris, unspecified vessel or lesion type       clopidogrel 75 MG tablet    PLAVIX    90 tablet    Take 1 tablet (75 mg) by mouth daily    S/P coronary artery stent placement, Coronary artery disease involving native coronary artery of native heart without angina pectoris        METOPROLOL SUCCINATE ER PO      Take 25 mg by mouth        order for DME     1 Units    RIOS Stockings    Thrombophlebitis of superficial veins of right lower extremity       sildenafil 20 MG tablet    REVATIO    30 tablet    Use one to three tablets as needed. Never use with nitroglycerin, terazosin or doxazosin.    Erectile dysfunction due to arterial insufficiency

## 2018-11-07 NOTE — NURSING NOTE
Injectable influenza vaccine documentation    1. Has the patient received the information for the influenza vaccine? YES    2. Does the patient have a severe allergy to eggs (Patients with a severe egg allergy should be assessed by a medical provider, RN, or clinical pharmacist. If they receive the influenza vaccine, please have them observed for 15 minutes.)? No    3. Has the patient had an allergic reaction to previous influenza vaccines? No    4. Has the patient had any severe allergic reactions to past influenza vaccines ? No       5. Does patient have a history of Guillain-Roberts syndrome? No        Based on responses above, I administered the influenza vaccine.  November Paw, CMA

## 2018-12-20 DIAGNOSIS — Z95.5 S/P CORONARY ARTERY STENT PLACEMENT: ICD-10-CM

## 2018-12-20 DIAGNOSIS — I25.10 CORONARY ARTERY DISEASE INVOLVING NATIVE CORONARY ARTERY OF NATIVE HEART WITHOUT ANGINA PECTORIS: ICD-10-CM

## 2018-12-21 RX ORDER — CLOPIDOGREL BISULFATE 75 MG/1
75 TABLET ORAL DAILY
Qty: 90 TABLET | Refills: 3 | Status: SHIPPED | OUTPATIENT
Start: 2018-12-21 | End: 2020-01-10

## 2019-01-23 DIAGNOSIS — I25.10 CORONARY ARTERY DISEASE INVOLVING NATIVE HEART WITHOUT ANGINA PECTORIS, UNSPECIFIED VESSEL OR LESION TYPE: ICD-10-CM

## 2019-01-24 RX ORDER — ATORVASTATIN CALCIUM 40 MG/1
40 TABLET, FILM COATED ORAL DAILY
Qty: 90 TABLET | Refills: 3 | Status: SHIPPED | OUTPATIENT
Start: 2019-01-24 | End: 2019-11-29

## 2019-06-11 ENCOUNTER — AMBULATORY - HEALTHEAST (OUTPATIENT)
Dept: CARDIOLOGY | Facility: CLINIC | Age: 64
End: 2019-06-11

## 2019-06-11 DIAGNOSIS — Z95.810 ICD (IMPLANTABLE CARDIOVERTER-DEFIBRILLATOR) IN PLACE: ICD-10-CM

## 2019-06-11 ASSESSMENT — MIFFLIN-ST. JEOR: SCORE: 1491.6

## 2019-06-21 ENCOUNTER — OFFICE VISIT (OUTPATIENT)
Dept: FAMILY MEDICINE | Facility: CLINIC | Age: 64
End: 2019-06-21
Payer: COMMERCIAL

## 2019-06-21 VITALS
WEIGHT: 160.2 LBS | RESPIRATION RATE: 20 BRPM | DIASTOLIC BLOOD PRESSURE: 75 MMHG | TEMPERATURE: 98.5 F | OXYGEN SATURATION: 100 % | SYSTOLIC BLOOD PRESSURE: 116 MMHG | HEART RATE: 81 BPM | BODY MASS INDEX: 24 KG/M2

## 2019-06-21 DIAGNOSIS — Z00.00 HEALTH CARE MAINTENANCE: ICD-10-CM

## 2019-06-21 DIAGNOSIS — R10.84 ABDOMINAL PAIN, GENERALIZED: Primary | ICD-10-CM

## 2019-06-21 NOTE — PATIENT INSTRUCTIONS
Thank you for coming to [facility].    The phone number we will call with results is # [phone] (home) . If this is not the best number please call our clinic and change the number.  If you need any refills please call your pharmacy and they will contact us.  If you have any concerns about today's visit or wish to schedule another appointment please call our office during normal business hours [phone] (8-5:00 M-F)  If you have urgent medical concerns please call [phone] at any time of the day.  If you a medical emergency please call [phone]  Again thank you for choosing [facility] and please let us know how we can best partner with you to improve you and your family's health.    GASTROENTEROLOGY ADULT REF PROCEDURE ONLY  [facility]  Phone [phone]  For the Specialized Scheduling team press option #3, extension [phone].    [address] [address]    Date: Wednesday, September 11th  Time: Arrive at 6:45am for an 8:45am procedure    They will mail the prep instructions.    Please call us at [phone] if you have any additional questions.    Mary Pritchard

## 2019-06-22 NOTE — PROGRESS NOTES
"There are no exam notes on file for this visit.  Chief Complaint   Patient presents with     Abdominal Pain     abdominal pain x1 wk, there was a lot of cramping but slowly getting better       Subjective: The patient comes in today concerned that he might have colon cancer.    The patient previously had colonoscopy in 2006.  He has not had a colonoscopy since that time.    The patient is concerned because he developed crampy abdominal pain last week.  This was not associated with any change in his bowel movements.  The patient and I reviewed the [place] stool chart, and he does not have stools consistent with constipation.  He generally has a bowel movement each morning.  He has not had fever or chills.  He has not had blood in his stools.  He has not had melena.  He has not had a period of the abdominal pain like this in the past.  The abdominal discomfort was located in his pelvis.    The patient tells me that his diet was quite poor over the days immediately prior to this episode of abdominal pain.  He took a probiotic, and that \"straighten things out\".  He notes that the abdominal pain has resolved.  He notes that generally his diet is quite good eating a fair amount of salads, and grilled fish.  He is concerned that he has been eating seeds, and wonders whether or not this could have contributed to diverticulitis.    The patient also has erectile dysfunction.  He brings with him a supplement that he purchased at a nutritional store, and wonders whether or not it is safe to take.    Objective:    Blood pressure 116/75, pulse 81, temperature 98.5  F (36.9  C), temperature source Oral, resp. rate 20, weight 72.7 kg (160 lb 3.2 oz), SpO2 100 %.  Body mass index is 24 kg/m .    General:  Well nourished, and in no acute distress.  The vital signs are reviewed  Heart:  RRR.  There are no murmurs, rubs, or gallups,   Lungs:  CTAB, no wheezes/rales/rhonchi, good air movement  Abdomen: soft, no tenderness, no masses, no " "guarding, no rebound, BS normal  Extremities: no cyanosis, edema or clubbing  Psych: Euthymic       Assessment and plan:        Abdominal pain, generalized  -     GASTROENTEROLOGY ADULT REF PROCEDURE ONLY    I discussed with the patient that his examination is completely normal today.  I think that it is quite unlikely that this is secondary to a large intestinal cancer.  Nonetheless, the patient is overdue for his colonoscopy.  Therefore, I believe that colonoscopy is indicated.  Additionally, this episode could have been consistent with diverticulitis.  The pain seems to have resolved spontaneously, so I do not believe that further therapy is indicated.  Nonetheless, colonoscopy is indicated after an episode of diverticulitis, so this is an additional reason for him to have colonoscopy.    The patient did have a cardiac arrest in the past.  It is not clear to me whether or not GI will be comfortable performing a colonoscopy in the outpatient setting, or if they would prefer to have this do done in a higher acuity setting.    The patient is due for healthcare maintenance visit.  At that visit, we would need to check lipids, LFTs, etc.  We could perform this at the time of her preop if GI wants to do the colonoscopy in a high acuity setting.  If GI is comfortable doing the colonoscopy in the outpatient setting the patient will set up a visit for healthcare maintenance.    Unfortunately our Pharm.D.'s are unavailable today to review his herbal supplement.  I have looked up each of the ingredients in the natural database, and reviewed them with him.  Several of them are \"possibly unsafe\".  Additionally, the supplement contains caffeine, and an herbal preparation that could contain caffeine.  Therefore, I told the patient that it was not in his best interest to take this herbal product.  We discussed the lack of consistency in herbal medications, and leanna limited regulatory oversight.    Health care maintenance  -     " GASTROENTEROLOGY ADULT REF PROCEDURE ONLY        Patient Instructions   Thank you for coming to [facility].    The phone number we will call with results is # [phone] (home) . If this is not the best number please call our clinic and change the number.  If you need any refills please call your pharmacy and they will contact us.  If you have any concerns about today's visit or wish to schedule another appointment please call our office during normal business hours [phone] (8-5:00 M-F)  If you have urgent medical concerns please call [phone] at any time of the day.  If you a medical emergency please call [phone]  Again thank you for choosing [facility] and please let us know how we can best partner with you to improve you and your family's health.    GASTROENTEROLOGY ADULT REF PROCEDURE ONLY  [facility]  Phone [phone]  For the Specialized Scheduling team press option #[id number], extension [phone].    [address] [address]    Date: Wednesday, September 11th  Time: Arrive at 6:45am for an 8:45am procedure    They will mail the prep instructions.    Please call us at [phone] if you have any additional questions.    Mary Pritchard     We spent  over 25 minutes during this visit face to face.  Over half of this time was spent in counseling and coordination of care with discussion of the above items.  The patient was actively involved in the decision making process, and all the questions were answered to their satisfaction prior to leaving.

## 2019-06-25 ENCOUNTER — OFFICE VISIT (OUTPATIENT)
Dept: FAMILY MEDICINE | Facility: CLINIC | Age: 64
End: 2019-06-25
Payer: COMMERCIAL

## 2019-06-25 VITALS
RESPIRATION RATE: 16 BRPM | WEIGHT: 160 LBS | TEMPERATURE: 98.3 F | DIASTOLIC BLOOD PRESSURE: 76 MMHG | HEART RATE: 77 BPM | BODY MASS INDEX: 23.97 KG/M2 | SYSTOLIC BLOOD PRESSURE: 115 MMHG | OXYGEN SATURATION: 98 %

## 2019-06-25 DIAGNOSIS — K64.4 EXTERNAL HEMORRHOIDS: Primary | ICD-10-CM

## 2019-06-25 RX ORDER — HYDROCORTISONE ACETATE 25 MG/1
25 SUPPOSITORY RECTAL 2 TIMES DAILY
Qty: 24 SUPPOSITORY | Refills: 1 | Status: SHIPPED | OUTPATIENT
Start: 2019-06-25 | End: 2019-09-06

## 2019-06-25 NOTE — PROGRESS NOTES
SUBJECTIVE       Inocente Rios is a 64 year old  male with a PMH significant for:     Patient Active Problem List   Diagnosis     CAD (coronary artery disease)     Encephalopathy     S/P ICD (internal cardiac defibrillator) procedure     H/O cardiac arrest     S/P coronary artery stent placement     He presents with concerns of hemorrhoid.    Patient reports today with new onset rectal pain and mass noticed when washing.  He describes the sensation as itching, and burning.  He describes the mass as hard and tender while sitting but he can change position and ignore the pain effectively. He says that this mass feels closer to the surface of his rectum than the others because it is easier to reach. He tried some OTC cream for it starting this AM.   He has not had apparent blood or melena in stool. The patient reports a 20 - 25 year history of hemorrhoids.    He notes 2-3 soft stools per day.  He does not believe he is straining at the stools.    PMH, Medications and Allergies were reviewed and updated as needed.        REVIEW OF SYSTEMS     CONSTITUTIONAL: NEGATIVE for fever, chills, change in weight  GI: NEGATIVE for nausea, abdominal pain, heartburn, or change in bowel habits  : NEGATIVE for frequency, dysuria, or hematuria        OBJECTIVE     Vitals:    06/25/19 1517   BP: 115/76   Pulse: 77   Resp: 16   Temp: 98.3  F (36.8  C)   TempSrc: Oral   SpO2: 98%   Weight: 72.6 kg (160 lb)     Body mass index is 23.97 kg/m .    Constitutional: Awake, alert, cooperative, no apparent distress, and appears stated age.  Examination of his perirectal area does show one small hemorrhoid.  It does not appear to be thrombosed.  It is not particularly tender.  The overlying skin is intact.    ASSESSMENT AND PLAN       External hemorrhoids  -     hydrocortisone (ANUSOL-HC) 25 MG suppository; Place 1 suppository (25 mg) rectally 2 times daily    I do believe the patient has an external hemorrhoid.  I discussed care of  the hemorrhoids with the patient.  We will start with Anusol suppositories.  Use of the suppositories was discussed with the patient.  Patient will call return to clinic if this is ineffective, or if he has any further questions or concerns.    Montserrat Monroy    I was present with the medical student who participated in the service and in the documentation of this note. I have verified the history and personally performed the physical exam and medical decision making, and have verified the content of the note, which accurately reflects my assessment of the patient and the plan of care.   Yung Bowen MD

## 2019-06-25 NOTE — PATIENT INSTRUCTIONS
Thank you for coming to [facility].  **If you had lab testing today and your results are reassuring or normal they will be be mailed to you within 7 days.   **If the lab tests need quick action we will call you with the results.  The phone number we will call with results is # [phone] (home) . If this is not the best number please call our clinic and change the number.  If you need any refills please call your pharmacy and they will contact us.  If you have any concerns about today's visit or wish to schedule another appointment please call our office during normal business hours [phone] (8-5:00 M-F)  If you have urgent medical concerns please call [phone] at any time of the day.  If you a medical emergency please call [phone]  Again thank you for choosing [facility] and please let us know how we can best partner with you to improve you and your family's health.    Patient Education     Hemorrhoids    Hemorrhoids are swollen and inflamed veins inside the rectum and near the anus. The rectum is the last several inches of the colon. The anus is the passage between the rectum and the outside of the body.  Causes  The veins can become swollen due to increased pressure in them. This is most often caused by:    Chronic constipation or diarrhea    Straining when having a bowel movement    Sitting too long on the toilet    A low-fiber diet    Pregnancy  Symptoms    Bleeding from the rectum (this may be noticeable after bowel movements)    Lump near the anus    Itching around the anus    Pain around the anus  There are different types of hemorrhoids. Depending on the type you have and the severity, you may be able to treat yourself at home. In some cases, a procedure may be the best treatment option. Your healthcare provider can tell you more about this, if needed.  Home care  General care    To get relief from pain or itching, try:  ? Medicines. Your healthcare provider may recommend stool softeners,  suppositories, or laxatives to help manage constipation. Use these exactly as directed.  ? Sitz baths. A sitz bath involves sitting in a few inches of warm bath water. Be careful not to make the water so hot that you burn yourself--test it before sitting in it. Soak for about 10 to 15 minutes a few times a day. This may help relieve pain.  ? Topical products. Your healthcare provider may prescribe or recommend creams, ointments, or pads that can be applied to the hemorrhoid. Use these exactly as directed.  Tips to help prevent hemorrhoids    Eat more fiber. Fiber adds bulk to stool and absorbs water as it moves through your colon. This makes stool softer and easier to pass.  ? Increase the fiber in your diet with more fiber-rich foods. These include fresh fruit, vegetables, and whole grains.  ? Take a fiber supplement or bulking agent, if advised by your healthcare provider. These include products such as psyllium or methylcellulose.    Drink more water. Your healthcare provider may direct you to drink plenty of water. This can help keep stool soft.    Be more active. Frequent exercise aids digestion and helps prevent constipation. It may also help make bowel movements more regular.    Don t strain during bowel movements. This can make hemorrhoids more likely. Also, don t sit on the toilet for long periods of time.  Follow-up care  Follow up with your healthcare provider as advised. If a culture or imaging tests were done, someone will let you know the results when they are ready. This may take a few days or longer. If your healthcare provider recommends a procedure for your hemorrhoids, these options can be discussed. Options may include surgery and outpatient office treatments.  When to seek medical advice  Call your healthcare provider right away if any of these occur:    Increased bleeding from the rectum    Increased pain around the rectum or anus    Weakness or dizziness  Call 911  Call 911 if any of these  occur:    Trouble breathing or swallowing    Fainting or loss of consciousness    Unusually fast heart rate    Vomiting blood    Large amounts of blood in stool or black, tarry stools  Date Last Reviewed: 9/1/2017 2000-2018 [employer]. [address] [place] [address], [place] [address]. All rights reserved. This information is not intended as a substitute for professional medical care. Always follow your healthcare professional's instructions.           Patient Education     Treating Hemorrhoids: Self-Care    Follow your healthcare provider s advice about caring for your hemorrhoids at home. Some treatments help relieve symptoms right away. Others involve making changes in your diet and exercise habits. These can help ease constipation and prevent hemorrhoid symptoms from coming back.  Relieving symptoms  Your healthcare provider may prescribe anti-inflammatory medicine to help ease your symptoms. The following tips will also help relieve pain and swelling.    Take sitz baths. Taking a sitz bath means sitting in a few inches of warm bath water. Soaking for 10 minutes twice a day can provide welcome relief from painful hemorrhoids. It can also help the area stay clean.    Develop good bowel habits. Use the bathroom when you need to. Don t ignore the urge to move your bowels. This can lead to constipation, hard stools, and straining. Also, don t read while on the toilet. Sit only as long as needed. Wipe gently with soft, unscented toilet tissue or baby wipes.    Use ice packs. Placing an ice pack on a thrombosed external hemorrhoid can help relieve pain right away. It will also help reduce the blood clot. Use the ice for 15 to 20 minutes at a time. Keep a cloth between the ice and your skin to prevent skin damage.    Use other measures. Laxatives and enemas can help ease constipation. But use them only on your healthcare provider s advice. For symptom relief, try using cotton pads soaked in witch hazel. These  are available at most drugstores. Over-the-counter hemorrhoid ointments and petroleum jelly can also provide relief.  Add fiber to your diet  Adding fiber to your diet can help relieve constipation by making stools softer and easier to pass. To increase your fiber intake, your healthcare provider may recommend a bulking agent, such as psyllium. This is a high-fiber supplement available at most grocery stores and drugstores. Eating more fiber-rich foods will also help. There are two types of fiber:    Insoluble fiber is the main ingredient in bulking agents. It s also found in foods such as wheat bran, whole-grain breads, fresh fruits, and vegetables.    Soluble fiber is found in foods such as oat bran. Although soluble fiber is good for you, it may not ease constipation as much as foods high in insoluble fiber.  Drink more water  Along with a high-fiber diet, drinking more water can help ease constipation. This is because insoluble fiber absorbs water, making stools soft and bulky. Be sure to drink plenty of water throughout the day. Drinking fruit juices, such as prune juice or apple juice, can also help prevent constipation.  Get more exercise  Regular exercise aids digestion and helps prevent constipation. It s also great for your health. So talk with your healthcare provider about starting an exercise program. Low-impact activities, such as swimming or walking, are good places to start. Take it easy at first. And remember to drink plenty of water when you exercise.  High-fiber foods  High-fiber foods offer many benefits. By making your stools softer, they help heal and prevent swollen hemorrhoids. They may also help reduce the risk of colon and rectal cancer. Best of all, they re usually low in calories and taste great. Here are some examples of fiber-rich foods.    Whole grains, such as wheat bran, corn bran, and brown rice.    Vegetables, especially carrots, broccoli, cabbage, and peas.    Fruits, such as  apples, bananas, raisins, peaches, and pears.    Nuts and legumes, especially peanuts, lentils, and kidney beans.  Easy ways to add fiber  The tips below offer some simple ways to add more high-fiber foods to your meals.    Start your day with a high-fiber breakfast. Eat a wheat bran cereal along with a sliced banana. Or, try peanut butter on whole-wheat toast.    Eat carrot sticks for snacks. They re easy to prepare, taste great, and are low in calories.    Use whole-grain breads instead of white bread for sandwiches.    Eat fruits for treats. Try an apple and some raisins instead of a candy bar.   Date Last Reviewed: 7/1/2016 2000-2018 [employer]. [address] [place] [address], [place] [address]. All rights reserved. This information is not intended as a substitute for professional medical care. Always follow your healthcare professional's instructions.

## 2019-06-26 ENCOUNTER — TELEPHONE (OUTPATIENT)
Dept: FAMILY MEDICINE | Facility: CLINIC | Age: 64
End: 2019-06-26

## 2019-06-26 DIAGNOSIS — K64.4 EXTERNAL HEMORRHOIDS: Primary | ICD-10-CM

## 2019-06-26 NOTE — TELEPHONE ENCOUNTER
[facility] phone call message- general phone call:    Reason for call:     Pharmacy states insurance will not cover and a prior authorization will be needed for approval.    Action desired:     Pt states medication is costing over $300 and he cannot afford it. He was wondering if a prior authorization can be put in or if an alternative can be prescribed.     Return call needed: Yes    OK to leave a message on voice mail? Yes    Advised patient to response may take up to 2 business days: Yes    Primary language: English      needed? No    Call taken on June 26, 2019 at 9:33 AM by Kamryn Martinez

## 2019-06-27 NOTE — TELEPHONE ENCOUNTER
hydrocortisone (ANUSOL-HC) 25 MG suppository  Place 1 suppository (25 mg) rectally 2 times daily - Rectal

## 2019-06-28 PROBLEM — Z98.890 HISTORY OF COLONOSCOPY: Status: ACTIVE | Noted: 2019-06-28

## 2019-09-06 ENCOUNTER — OFFICE VISIT (OUTPATIENT)
Dept: FAMILY MEDICINE | Facility: CLINIC | Age: 64
End: 2019-09-06
Payer: COMMERCIAL

## 2019-09-06 VITALS
BODY MASS INDEX: 23.79 KG/M2 | TEMPERATURE: 97.9 F | HEIGHT: 68 IN | RESPIRATION RATE: 20 BRPM | SYSTOLIC BLOOD PRESSURE: 120 MMHG | OXYGEN SATURATION: 96 % | HEART RATE: 79 BPM | WEIGHT: 157 LBS | DIASTOLIC BLOOD PRESSURE: 82 MMHG

## 2019-09-06 DIAGNOSIS — Z01.818 PREOPERATIVE EXAMINATION: Primary | ICD-10-CM

## 2019-09-06 DIAGNOSIS — N52.01 ERECTILE DYSFUNCTION DUE TO ARTERIAL INSUFFICIENCY: ICD-10-CM

## 2019-09-06 DIAGNOSIS — Z01.818 PREOPERATIVE EXAMINATION: ICD-10-CM

## 2019-09-06 DIAGNOSIS — Z01.818 PREOP GENERAL PHYSICAL EXAM: Primary | ICD-10-CM

## 2019-09-06 LAB
% GRANULOCYTES: 67.2 %G (ref 40–75)
BUN SERPL-MCNC: 16 MG/DL (ref 7–21)
CALCIUM SERPL-MCNC: 8.9 MG/DL (ref 8.5–10.1)
CHLORIDE SERPLBLD-SCNC: 104.2 MMOL/L (ref 98–110)
CO2 SERPL-SCNC: 28.5 MMOL/L (ref 20–32)
CREAT SERPL-MCNC: 1.1 MG/DL (ref 0.7–1.3)
GFR SERPL CREATININE-BSD FRML MDRD: 71.6 ML/MIN/1.7 M2
GLUCOSE SERPL-MCNC: 105.5 MG'DL (ref 70–99)
GRANULOCYTES #: 4.4 K/UL (ref 1.6–8.3)
HCT VFR BLD AUTO: 44.7 % (ref 40–53)
HEMOGLOBIN: 13.7 G/DL (ref 13.3–17.7)
LYMPHOCYTES # BLD AUTO: 1.8 K/UL (ref 0.8–5.3)
LYMPHOCYTES NFR BLD AUTO: 26.8 %L (ref 20–48)
MCH RBC QN AUTO: 29.5 PG (ref 26.5–35)
MCHC RBC AUTO-ENTMCNC: 30.6 G/DL (ref 32–36)
MCV RBC AUTO: 96.4 FL (ref 78–100)
MID #: 0.4 K/UL (ref 0–2.2)
MID %: 6 %M (ref 0–20)
PLATELET # BLD AUTO: 245 K/UL (ref 150–450)
POTASSIUM SERPL-SCNC: 4 MMOL/DL (ref 3.2–4.6)
RBC # BLD AUTO: 4.6 M/UL (ref 4.4–5.9)
SODIUM SERPL-SCNC: 139.4 MMOL/L (ref 132–142)
WBC # BLD AUTO: 6.6 K/UL (ref 4–11)

## 2019-09-06 RX ORDER — SILDENAFIL 100 MG/1
TABLET, FILM COATED ORAL
Qty: 20 TABLET | Refills: 11 | Status: SHIPPED | OUTPATIENT
Start: 2019-09-06

## 2019-09-06 ASSESSMENT — MIFFLIN-ST. JEOR: SCORE: 1476.65

## 2019-09-06 NOTE — PROGRESS NOTES
[address] [address]  Phone: [phone]  Fax: [phone]    9/6/2019    Adult PRE-OP Evaluation:    Inocente Rios, 1955 presents for pre-operative evaluation and assessment as requested by Dr. VIERA, prior to undergoing surgery/procedure for treatment of  Health care maintenance  .    Proposed procedure: Colonoscopy    Date of Surgery/ Procedure: 9/11/19  Hospital/Surgical Facility: [facility]     Primary Physician: Yung Bowen  Type of Anesthesia Anticipated: to be determined  History of anesthesia complications: NONE  History of  abnormal bleeding: NONE   History of blood transfusions: NO  Patient has a Health Care Directive or Living Will:  NO    Preoperative Questions   1. YES - Do you have a history of heart attack, stroke, stent, bypass or surgery on an artery in the head, neck, heart or legs? Had cardiac stent placed 2015, then cardiac arrest 7/2015 with ICD placement  2. YES - Do you ever have any pain or discomfort in your chest? During 2015, none since  3. YES - Have you ever had a severe pain across the front of your chest lasting for half an hour or more? See above  4. NO - Do you have a history of Congestive Heart Failure?  5. NO - Are you troubled by shortness of breath when: walking on the level/ up a slight hill/ at night?  6. NO - Does your chest ever sound wheezy or whistling?  7. NO - Do you currently have a cold, bronchitis or other respiratory infection?  8. NO - Have you had a cold, bronchitis or other respiratory infection within the last 2 weeks?  9. NO - Do you usually have a cough?  10. NO - Do you sometimes get pains in the calves of your legs when you walk?  11. NO - Do you or anyone in your family have previous history of blood clots?  12. NO - Do you or does anyone in your family have a serious bleeding problem such as prolonged bleeding following surgeries or cuts?  13. NO - Have you ever had problems with anemia or been told to  take iron pills?  14. NO - Have you had any abnormal blood loss such as black, tarry or bloody stools, or abnormal vaginal bleeding?  15. NO - Have you ever had a blood transfusion?  16. NO - Have you or any of your relatives ever had problems with anesthesia?  17. NO - Do you have sleep apnea, excessive snoring or daytime drowsiness?  18. NO - Do you have any prosthetic heart valves?  19. NO - Do you have prosthetic joints?  20. NO - Is there any chance that you may be pregnant?    Patient Active Problem List   Diagnosis     CAD (coronary artery disease)     Encephalopathy     S/P ICD (internal cardiac defibrillator) procedure     H/O cardiac arrest     S/P coronary artery stent placement     History of colonoscopy         Current Outpatient Medications on File Prior to Visit:  albuterol (PROAIR HFA/PROVENTIL HFA/VENTOLIN HFA) 108 (90 BASE) MCG/ACT Inhaler Inhale 2 puffs into the lungs   ASPIRIN PO Take 81 mg by mouth   atorvastatin (LIPITOR) 40 MG tablet Take 1 tablet (40 mg) by mouth daily Please come in for an office visit   clopidogrel (PLAVIX) 75 MG tablet Take 1 tablet (75 mg) by mouth daily   METOPROLOL SUCCINATE ER PO Take 25 mg by mouth     No current facility-administered medications on file prior to visit.     OTC products: None, except as noted above    No Known Allergies  Latex Allergy: NO    Social History     Socioeconomic History     Marital status:      Spouse name: None     Number of children: None     Years of education: None     Highest education level: None   Occupational History     None   Social Needs     Financial resource strain: None     Food insecurity:     Worry: None     Inability: None     Transportation needs:     Medical: None     Non-medical: None   Tobacco Use     Smoking status: Never Smoker     Smokeless tobacco: Never Used   Substance and Sexual Activity     Alcohol use: Yes     Alcohol/week: 0.0 oz     Drug use: No     Sexual activity: Yes     Partners: Female  "  Lifestyle     Physical activity:     Days per week: None     Minutes per session: None     Stress: None   Relationships     Social connections:     Talks on phone: None     Gets together: None     Attends Congregational service: None     Active member of club or organization: None     Attends meetings of clubs or organizations: None     Relationship status: None     Intimate partner violence:     Fear of current or ex partner: None     Emotionally abused: None     Physically abused: None     Forced sexual activity: None   Other Topics Concern     None   Social History Narrative     None       REVIEW OF SYSTEMS:   Constitutional, HEENT, cardiovascular, pulmonary, gi and gu systems are negative, except as otherwise noted.    EXAM:     Patient Vitals for the past 24 hrs:   BP Temp Temp src Pulse Resp SpO2 Height Weight   09/06/19 1545 120/82 97.9  F (36.6  C) Oral 79 20 96 % 1.727 m (5' 8\") 71.2 kg (157 lb)     Body mass index is 23.87 kg/m .  GENERAL: healthy, alert and no distress  EYES: Eyes grossly normal to inspection, extraocular movements - intact, and PERRL  HENT: ear canals- normal; TMs- normal; Nose- normal; Mouth- no ulcers, no lesions  NECK: no tenderness, no adenopathy, no asymmetry, no masses, no stiffness; thyroid- normal to palpation  RESP: lungs clear to auscultation - no rales, no rhonchi, no wheezes  CV: regular rates and rhythm, normal S1 S2, no S3 or S4 and no murmur, no click or rub -  ABDOMEN: soft, no tenderness, no  hepatosplenomegaly, no masses, normal bowel sounds  MS: extremities- no gross deformities noted, no edema  SKIN: no suspicious lesions, no rashes  NEURO: strength and tone- normal, sensory exam- grossly normal, mentation- intact, speech- normal, reflexes- symmetric  BACK: no CVA tenderness, no paralumbar tenderness  PSYCH: Alert and oriented times 3; speech- coherent , normal rate and volume; able to articulate logical thoughts  LYMPHATICS: ant. cervical- normal, post. cervical- " normal    DIAGNOSTICS:      EKG: Normal Sinus Rhythm, normal axis, normal intervals, no acute ST/T changes c/w ischemia, low voltage in extremity leads    Results for orders placed or performed in visit on 09/06/19   CBC with Diff Plt ([facility])   Result Value Ref Range    WBC 6.6 4.0 - 11.0 K/uL    Lymphocytes # 1.8 0.8 - 5.3 K/uL    % Lymphocytes 26.8 20.0 - 48.0 %L    Mid # 0.4 0.0 - 2.2 K/uL    Mid % 6.0 0.0 - 20.0 %M    GRANULOCYTES # 4.4 1.6 - 8.3 K/uL    % Granulocytes 67.2 40.0 - 75.0 %G    RBC 4.6 4.4 - 5.9 M/uL    Hemoglobin 13.7 13.3 - 17.7 g/dL    Hematocrit 44.7 40.0 - 53.0 %    MCV 96.4 78.0 - 100.0 fL    MCH 29.5 26.5 - 35.0    MCHC 30.6 (L) 32.0 - 36.0 g/dL    Platelets 245.0 150.0 - 450.0 K/uL   Basic Metabolic Panel ([facility])  - Results < 1 hr   Result Value Ref Range    Urea Nitrogen 16.0 7.0 - 21.0 mg/dL    Calcium 8.9 8.5 - 10.1 mg/dL    Chloride 104.2 98.0 - 110.0 mmol/L    Carbon Dioxide 28.5 20.0 - 32.0 mmol/L    Creatinine 1.1 0.7 - 1.3 mg/dL    Glucose 105.5 (H) 70.0 - 99.0 mg'dL    Potassium 4.0 3.2 - 4.6 mmol/dL    Sodium 139.4 132.0 - 142.0 mmol/L    GFR Estimate 71.6 >60.0 mL/min/1.7 m2    GFR Estimate If Black 86.7 >60.0 mL/min/1.7 m2     RISK ASSESSMENT:     Cardiovascular Risk:  -Patient is able to participate in strenuous activities without chest pain.  -The patient does not have chest pain with exertion.  -Patient does not have a history of congestive heart failure.    -The patient does not have a history of stroke and does not have a history of valvular disease.    Pulmonary Risk:  -In terms of risk factors for pulmonary complication, the patient is older then 60    Perioperative Complications:  -The patient does not have a history of bleeding or clotting problems in the past.    -The patient has not had complications from surgeries.    -The patient does not have a family history of any anesthesia or surgical complications.      IMPRESSION:   Reason for surgery/procedure: Health care  maintenance, overdue for colonoscopy    The proposed surgical procedure is considered LOW risk.    For above listed surgery and anesthesia:   Patient is at low risk for surgery/procedure and perioperative/procedure complications.    RECOMMENDATIONS:     Labs:  See above    Preop Plan:  --Approval given to proceed with proposed procedure, without further diagnostic evaluation    Medications:  Patient should take their regular medications the morning of surgery unless otherwise instructed.    Please discuss aspirin and Plavix with the GI doctors      Yung Bowen MD    Please contact our office if there are any further questions or information required about this patient.

## 2019-09-09 DIAGNOSIS — Z01.818 PREOPERATIVE EXAMINATION: ICD-10-CM

## 2019-09-11 ENCOUNTER — TRANSFERRED RECORDS (OUTPATIENT)
Dept: MULTI SPECIALTY CLINIC | Facility: CLINIC | Age: 64
End: 2019-09-11

## 2019-09-11 ENCOUNTER — TRANSFERRED RECORDS (OUTPATIENT)
Dept: HEALTH INFORMATION MANAGEMENT | Facility: CLINIC | Age: 64
End: 2019-09-11

## 2019-09-12 ENCOUNTER — AMBULATORY - HEALTHEAST (OUTPATIENT)
Dept: CARDIOLOGY | Facility: CLINIC | Age: 64
End: 2019-09-12

## 2019-09-12 DIAGNOSIS — Z95.810 ICD (IMPLANTABLE CARDIOVERTER-DEFIBRILLATOR) IN PLACE: ICD-10-CM

## 2019-11-12 ENCOUNTER — COMMUNICATION - HEALTHEAST (OUTPATIENT)
Dept: CARDIOLOGY | Facility: CLINIC | Age: 64
End: 2019-11-12

## 2019-11-12 DIAGNOSIS — I42.9 CARDIOMYOPATHY (H): ICD-10-CM

## 2019-11-15 ENCOUNTER — OFFICE VISIT - HEALTHEAST (OUTPATIENT)
Dept: CARDIOLOGY | Facility: CLINIC | Age: 64
End: 2019-11-15

## 2019-11-15 DIAGNOSIS — I73.00 RAYNAUD'S DISEASE WITHOUT GANGRENE: ICD-10-CM

## 2019-11-15 DIAGNOSIS — E78.00 PURE HYPERCHOLESTEROLEMIA: ICD-10-CM

## 2019-11-15 DIAGNOSIS — I25.5 ISCHEMIC CARDIOMYOPATHY: ICD-10-CM

## 2019-11-15 DIAGNOSIS — I25.10 CORONARY ARTERY DISEASE INVOLVING NATIVE CORONARY ARTERY OF NATIVE HEART WITHOUT ANGINA PECTORIS: ICD-10-CM

## 2019-11-15 DIAGNOSIS — Z95.810 ICD (IMPLANTABLE CARDIOVERTER-DEFIBRILLATOR) IN PLACE: ICD-10-CM

## 2019-11-15 DIAGNOSIS — I21.02 ST ELEVATION MYOCARDIAL INFARCTION INVOLVING LEFT ANTERIOR DESCENDING (LAD) CORONARY ARTERY (H): ICD-10-CM

## 2019-11-19 ENCOUNTER — HOSPITAL ENCOUNTER (OUTPATIENT)
Dept: NUCLEAR MEDICINE | Facility: CLINIC | Age: 64
Discharge: HOME | End: 2019-11-19
Attending: INTERNAL MEDICINE

## 2019-11-19 ENCOUNTER — HOSPITAL ENCOUNTER (OUTPATIENT)
Dept: CARDIOLOGY | Facility: CLINIC | Age: 64
Discharge: HOME | End: 2019-11-19
Attending: INTERNAL MEDICINE

## 2019-11-19 DIAGNOSIS — I25.10 CORONARY ARTERY DISEASE INVOLVING NATIVE CORONARY ARTERY OF NATIVE HEART WITHOUT ANGINA PECTORIS: ICD-10-CM

## 2019-11-19 LAB
CV STRESS CURRENT BP HE: NORMAL
CV STRESS CURRENT HR HE: 102
CV STRESS CURRENT HR HE: 104
CV STRESS CURRENT HR HE: 106
CV STRESS CURRENT HR HE: 109
CV STRESS CURRENT HR HE: 114
CV STRESS CURRENT HR HE: 128
CV STRESS CURRENT HR HE: 129
CV STRESS CURRENT HR HE: 134
CV STRESS CURRENT HR HE: 135
CV STRESS CURRENT HR HE: 142
CV STRESS CURRENT HR HE: 145
CV STRESS CURRENT HR HE: 146
CV STRESS CURRENT HR HE: 68
CV STRESS CURRENT HR HE: 70
CV STRESS CURRENT HR HE: 86
CV STRESS CURRENT HR HE: 86
CV STRESS CURRENT HR HE: 87
CV STRESS CURRENT HR HE: 89
CV STRESS CURRENT HR HE: 89
CV STRESS CURRENT HR HE: 91
CV STRESS CURRENT HR HE: 92
CV STRESS CURRENT HR HE: 97
CV STRESS CURRENT HR HE: 97
CV STRESS DEVIATION TIME HE: NORMAL
CV STRESS ECHO PERCENT HR HE: NORMAL
CV STRESS EXERCISE STAGE HE: NORMAL
CV STRESS EXERCISE STAGE REACHED HE: NORMAL
CV STRESS FINAL RESTING BP HE: NORMAL
CV STRESS FINAL RESTING HR HE: 87
CV STRESS MAX HR HE: 146
CV STRESS MAX TREADMILL GRADE HE: 14
CV STRESS MAX TREADMILL SPEED HE: 3.4
CV STRESS PEAK DIA BP HE: NORMAL
CV STRESS PEAK SYS BP HE: NORMAL
CV STRESS PHASE HE: NORMAL
CV STRESS PROTOCOL HE: NORMAL
CV STRESS RESTING PT POSITION HE: NORMAL
CV STRESS RESTING PT POSITION HE: NORMAL
CV STRESS ST DEVIATION AMOUNT HE: NORMAL
CV STRESS ST DEVIATION ELEVATION HE: NORMAL
CV STRESS ST EVELATION AMOUNT HE: NORMAL
CV STRESS TEST TYPE HE: NORMAL
CV STRESS TOTAL STAGE TIME MIN 1 HE: NORMAL
NUC STRESS EJECTION FRACTION: 62 %
RATE PRESSURE PRODUCT: NORMAL
STRESS ECHO BASELINE DIASTOLIC HE: 84
STRESS ECHO BASELINE HR: 62
STRESS ECHO BASELINE SYSTOLIC BP: 121
STRESS ECHO CALCULATED PERCENT HR: 94 %
STRESS ECHO LAST STRESS DIASTOLIC BP: 90
STRESS ECHO LAST STRESS HR: 145
STRESS ECHO LAST STRESS SYSTOLIC BP: 186
STRESS ECHO POST ESTIMATED WORKLOAD: 8.7
STRESS ECHO POST EXERCISE DUR MIN: 7
STRESS ECHO POST EXERCISE DUR SEC: 18
STRESS ECHO TARGET HR: 156

## 2019-11-27 DIAGNOSIS — I25.10 CORONARY ARTERY DISEASE INVOLVING NATIVE HEART WITHOUT ANGINA PECTORIS, UNSPECIFIED VESSEL OR LESION TYPE: ICD-10-CM

## 2019-11-29 RX ORDER — ATORVASTATIN CALCIUM 40 MG/1
40 TABLET, FILM COATED ORAL DAILY
Qty: 90 TABLET | Refills: 3 | Status: SHIPPED | OUTPATIENT
Start: 2019-11-29 | End: 2020-12-10

## 2019-12-09 ENCOUNTER — ALLIED HEALTH/NURSE VISIT (OUTPATIENT)
Dept: FAMILY MEDICINE | Facility: CLINIC | Age: 64
End: 2019-12-09
Payer: COMMERCIAL

## 2019-12-09 VITALS
TEMPERATURE: 97.5 F | OXYGEN SATURATION: 100 % | DIASTOLIC BLOOD PRESSURE: 99 MMHG | HEART RATE: 71 BPM | SYSTOLIC BLOOD PRESSURE: 154 MMHG

## 2019-12-09 DIAGNOSIS — Z23 NEED FOR PROPHYLACTIC VACCINATION AND INOCULATION AGAINST INFLUENZA: Primary | ICD-10-CM

## 2019-12-10 ENCOUNTER — COMMUNICATION - HEALTHEAST (OUTPATIENT)
Dept: CARDIOLOGY | Facility: CLINIC | Age: 64
End: 2019-12-10

## 2019-12-18 ENCOUNTER — AMBULATORY - HEALTHEAST (OUTPATIENT)
Dept: CARDIOLOGY | Facility: CLINIC | Age: 64
End: 2019-12-18

## 2020-01-03 ENCOUNTER — OFFICE VISIT (OUTPATIENT)
Dept: FAMILY MEDICINE | Facility: CLINIC | Age: 65
End: 2020-01-03
Payer: COMMERCIAL

## 2020-01-03 VITALS
WEIGHT: 159 LBS | RESPIRATION RATE: 16 BRPM | TEMPERATURE: 97.9 F | HEART RATE: 76 BPM | OXYGEN SATURATION: 100 % | SYSTOLIC BLOOD PRESSURE: 136 MMHG | DIASTOLIC BLOOD PRESSURE: 88 MMHG | BODY MASS INDEX: 23.55 KG/M2 | HEIGHT: 69 IN

## 2020-01-03 DIAGNOSIS — R97.20 ELEVATED PROSTATE SPECIFIC ANTIGEN (PSA): ICD-10-CM

## 2020-01-03 DIAGNOSIS — R03.0 ELEVATED BLOOD PRESSURE READING WITHOUT DIAGNOSIS OF HYPERTENSION: Primary | ICD-10-CM

## 2020-01-03 LAB — PSA SERPL-MCNC: 8.6 NG/ML (ref 0–4.5)

## 2020-01-03 ASSESSMENT — MIFFLIN-ST. JEOR: SCORE: 1493.72

## 2020-01-03 NOTE — PATIENT INSTRUCTIONS
Patient Education     Taking Your Blood Pressure  Blood pressure is the force of blood against the artery wall as it moves from the heart through the blood vessels. You can take your own blood pressure reading using a digital monitor. Take your readings the same each time, using the same arm. Take readings as often as your healthcare provider instructs.  About blood pressure monitors  Blood pressure monitors are designed for certain ages and cases. You can find monitors for older adults, for pregnant women, and for children. Make sure the one you choose is the right one for your age and situation.  The American Heart Association recommends an automatic cuff monitor that fits on your upper arm (bicep). The cuff should fit your arm size. A cuff that s too large or too small will not give an accurate reading. Measure around your upper arm to find your size.  Monitors that attach to your finger or wrist are not as accurate as monitors for your upper arm.  Ask your healthcare provider for help in choosing a monitor. Bring your monitor to your next provider visit if you need help in using it the correct way.  The steps below are general instructions for using an automatic digital monitor.  Step 1. Relax      Take your blood pressure at the same time every day, such as in the morning or evening, or at the time your healthcare provider recommends.    Wait at least a half-hour after smoking, eating, or exercising. Don't drink coffee, tea, soda, or other caffeinated beverages before checking your blood pressure.    Sit comfortably at a table with both feet on the floor. Do not cross your legs or feet. Place the monitor near you.    Rest for a few minutes before you begin.  Step 2. Wrap the cuff      Place your arm on the table, palm up. Your arm should be at the level of your heart. Wrap the cuff around your upper arm, just above your elbow. It s best done on bare skin, not over clothing. Most cuffs will indicate where the  brachial artery (the blood vessel in the middle of the arm at the inner side of the elbow) should line up with the cuff. Look in your monitor's instruction booklet for an illustration. You can also bring your cuff to your healthcare provider and have them show you how to correctly place the cuff.  Step 3. Inflate the cuff      Push the button that starts the pump.    The cuff will tighten, then loosen.    The numbers will change. When they stop changing, your blood pressure reading will appear.    Take 2 or 3 readings one minute apart.  Step 4. Write down the results of each reading      Write down your blood pressure numbers for each reading. Note the date and time. Keep your results in one place, such as a notebook. Even if your monitor has a built-in memory, keep a hard copy of the readings.    Remove the cuff from your arm. Turn off the machine.    Bring your blood pressure records with your healthcare providers at each visit.    If you start a new blood pressure medicine, note the day you started the new medicine. Also note the day if you change the dose of your medicine. This information goes on your blood pressure recording sheet. This will help your healthcare provider monitor how well the medicine changes are working.    Ask your healthcare provider what numbers should prompt you to call him or her. Also ask what numbers should prompt you to get help right away.  Date Last Reviewed: 11/1/2016 2000-2019 [employer]. [address] [place] [address], [place] [address]. All rights reserved. This information is not intended as a substitute for professional medical care. Always follow your healthcare professional's instructions.           Patient Education     Prostate-Specific Antigen (PSA)  Does this test have other names?  PSA  What is this test?  This test measures the level of prostate-specific antigen (PSA) in your blood.  The cells of the prostate gland make the protein called PSA. Men normally  have low levels of PSA. If your PSA levels start to rise, it could mean you have one of the below:    Prostate cancer    A benign prostate condition    Inflammation of the prostate    An infection in the prostate  Experts don t all agree on when to have PSA testing. The U.S. Preventative Services Task Force advises men who don't have any symptoms of prostate cancer to not have a PSA test. The task force says that PSA test results can lead to treating small cancers that would never be life-threatening.  The American Cancer Society advises that men be told the risks and benefits of PSA testing and make their own decision about if and when to be screened.  The American Urologic Society says that PSA screening is most important between the ages of 55 and 69. If you have a brother or father with prostate cancer or you are , you should start testing at age 40.   Why do I need this test?  You may need this test if you are age 50 or older and your healthcare provider wants to screen you for prostate cancer. Some providers advise screening at age 40 or 45 if you have a family history of prostate cancer or other risk factors.  You may also have this test if you have already been diagnosed with prostate cancer. This is so your healthcare provider can check your treatment and see if your cancer has come back.  What other tests might I have along with this test?  Your healthcare provider may also do a digital rectal exam (HUSSEIN). A HUSSEIN is a physical exam of the prostate, not a lab test. For the exam, your provider will place a gloved finger in your rectum and feel the prostate to check for any bumps or abnormal areas. The FDA recommends that a HUSSEIN be done along with a PSA test.  What do my test results mean?  Test results may vary depending on your age, gender, health history, the method used for the test, and other things. Your test results may not mean you have a problem. Ask your healthcare provider what your  test results mean for you.  Results are given in nanograms per milliliter, ng/mL.   Results below 4.0 ng/mL are seen as normal. But some healthcare providers may use these age-based results brackets to define normal for you:     Ages 40 to 49: 0-2.5 ng/mL    Ages 50 to 59: 0-3.5 ng/mL    Ages 60 to 69: 0-4.5 ng/mL    Ages 70 to 79: 0-6.5 ng/mL  A rising PSA may mean that you have cancer. But the PSA results alone won't tell your healthcare provider if it's cancer or a benign prostate condition. If your healthcare provider thinks you have cancer, you may need a biopsy of the prostate to confirm the diagnosis.  How is this test done?  The test is done with a blood sample. A needle is used to draw blood from a vein in your arm or hand.  Does this test pose any risks?  Having a blood test with a needle carries some risks. These include bleeding, infection, bruising, and feeling lightheaded. When the needle pricks your arm or hand, you may feel a slight sting or pain. Afterward, the site may be sore.  What might affect my test results?  Test results can be affected by:    An infection    Some medicines    Riding a bicycle before the test    Ejaculation before the test  How do I get ready for this test?  You may need to abstain from sex and not ride a bicycle within 1 to 2 days of the test. In addition, be sure your healthcare provider knows about all medicines, herbs, vitamins, and supplements you are taking. This includes medicines that don't need a prescription and any illegal drugs you may use.    1229-5704 [employer]. [address] [place] [address]. All rights reserved. This information is not intended as a substitute for professional medical care. Always follow your healthcare professional's instructions.

## 2020-01-03 NOTE — LETTER
January 6, 2020      Inocente Rios  [address] [id number]        Dear Inocente,    Please see below for your test results.   Your PSA is still elevated.  I've placed a referral to urology as we discussed at your recent visit     Resulted Orders   PSA Diagnostic ([facility])   Result Value Ref Range    PSA 8.6 (H) 0.0 - 4.5 ng/mL    Narrative    Test performed by:  [facility]  [address] [place] [address]  Method is Abbott Prostate-Specific Antigen (PSA)  Standard-WHO 1st International (90:10)       If you have any questions, please call the clinic to make an appointment.    Sincerely,    Yung Bowen MD

## 2020-01-03 NOTE — LETTER
January 3, 2020      Inocente Rios  [address] [facility] [id number]        To whom it may concern    Inocente Rios (1955) is my patient.  He underwent a screening colonoscopy for health promotion on 9/11/19.  He was asymptomatic.  He needed to have this performed in the hospital due to the presence of a medical device.  Performing the test in an outpatient setting would have been to risky.  However, this was a screening examination and should be treated as a health maintenance item rather than a surgical/hospital procedure.      Sincerely,            Yung Bowen MD

## 2020-01-03 NOTE — PROGRESS NOTES
There are no exam notes on file for this visit.  Chief Complaint   Patient presents with     Results     he had some lab work done for his life insurance policy and would like to discuss them      RECHECK     last time he was in he had a flu shot and his blood pressure was elevated       Subjective: The patient comes in today with several concerns.    His first concern is that he recently had a physical examination for life insurance.  As part of this, the insurance company obtained a battery of tests.  He brings with him those results today.  His tests included an completely normal urinalysis, and a normal CMP his LFTs were normal.  His HIV was normal.  His A1c was 5.7.    Of concern for the patient is that his PSA was elevated at 8.61.  The patient read about this online, and noted that people with an ejaculation within 48 hours of the blood test might have a spuriously elevated PSA.  The patient notes that he and I have discussed PSAs in the past.  He is elected not to have PSAs in our office due to the risk for false positives.  The patient would like to discuss his next steps.  The patient does have a family history of prostatic cancer    The patient had a colonoscopy per performed on 9/11/2019.  That was performed in [facility] secondary to his history of cardiac arrest with ICD placement.  He was asymptomatic, and the test was done for screening.  However the patient has received notification from his insurance company that this is considered to be outpatient hospital procedure rather than a screening test.  Apparently the reimbursement for this is much lower.  The patient has appealed this with his insurance company and lost.  He is now appealing this to the department of human services.  He would like a letter from me.  This is performed please see letters.    The patient notes that he had an elevated blood pressure when he came in for a flu shot.  He notes that he drank a large cup of black tea with  "caffeine immediately prior to that visit.    Objective:    Blood pressure 136/88, pulse 76, temperature 97.9  F (36.6  C), temperature source Oral, resp. rate 16, height 1.74 m (5' 8.5\"), weight 72.1 kg (159 lb), SpO2 100 %.  Body mass index is 23.82 kg/m .    General:  Well nourished, and in no acute distress.  The vital signs are reviewed, and are within normal limits.  The patient and I reviewed his previous blood pressures, and they all have been normal.    Assessment and plan:      Elevated blood pressure reading without diagnosis of hypertension  -     order for DME; Equipment being ordered: Home blood pressure monitor    The patient and I discussed the proper method for taking blood pressures.  The patient would like a home blood pressure monitor to follow his blood pressures.  We discussed having an average blood pressure of less than 140/90 patient will let me know if he has concerns or questions.    Elevated prostate specific antigen (PSA)  -     PSA Diagnostic ([place])    The patient and I discussed the next steps for the PSA.  I think initially, we should simply repeat the PSA.  If this repeat test is elevated, the patient will be referred to urology.  We discussed a HUSSEIN with urology, followed by ultrasound and biopsies of the prostate.  We discussed prostatic cancer and potential treatment outcomes.  The patient had several questions, which were answered to the best of my ability.       A total of 38 minutes was spent in face to face contact with patient with > 50% in counseling and coordination of care.  We discussed the above items      Patient Instructions     Patient Education     Taking Your Blood Pressure  Blood pressure is the force of blood against the artery wall as it moves from the heart through the blood vessels. You can take your own blood pressure reading using a digital monitor. Take your readings the same each time, using the same arm. Take readings as often as your healthcare " provider instructs.  About blood pressure monitors  Blood pressure monitors are designed for certain ages and cases. You can find monitors for older adults, for pregnant women, and for children. Make sure the one you choose is the right one for your age and situation.  The American Heart Association recommends an automatic cuff monitor that fits on your upper arm (bicep). The cuff should fit your arm size. A cuff that s too large or too small will not give an accurate reading. Measure around your upper arm to find your size.  Monitors that attach to your finger or wrist are not as accurate as monitors for your upper arm.  Ask your healthcare provider for help in choosing a monitor. Bring your monitor to your next provider visit if you need help in using it the correct way.  The steps below are general instructions for using an automatic digital monitor.  Step 1. Relax      Take your blood pressure at the same time every day, such as in the morning or evening, or at the time your healthcare provider recommends.    Wait at least a half-hour after smoking, eating, or exercising. Don't drink coffee, tea, soda, or other caffeinated beverages before checking your blood pressure.    Sit comfortably at a table with both feet on the floor. Do not cross your legs or feet. Place the monitor near you.    Rest for a few minutes before you begin.  Step 2. Wrap the cuff      Place your arm on the table, palm up. Your arm should be at the level of your heart. Wrap the cuff around your upper arm, just above your elbow. It s best done on bare skin, not over clothing. Most cuffs will indicate where the brachial artery (the blood vessel in the middle of the arm at the inner side of the elbow) should line up with the cuff. Look in your monitor's instruction booklet for an illustration. You can also bring your cuff to your healthcare provider and have them show you how to correctly place the cuff.  Step 3. Inflate the cuff      Push the  button that starts the pump.    The cuff will tighten, then loosen.    The numbers will change. When they stop changing, your blood pressure reading will appear.    Take 2 or 3 readings one minute apart.  Step 4. Write down the results of each reading      Write down your blood pressure numbers for each reading. Note the date and time. Keep your results in one place, such as a notebook. Even if your monitor has a built-in memory, keep a hard copy of the readings.    Remove the cuff from your arm. Turn off the machine.    Bring your blood pressure records with your healthcare providers at each visit.    If you start a new blood pressure medicine, note the day you started the new medicine. Also note the day if you change the dose of your medicine. This information goes on your blood pressure recording sheet. This will help your healthcare provider monitor how well the medicine changes are working.    Ask your healthcare provider what numbers should prompt you to call him or her. Also ask what numbers should prompt you to get help right away.  Date Last Reviewed: 11/1/2016 2000-2019 The [employer]. [address] [place] [address]. All rights reserved. This information is not intended as a substitute for professional medical care. Always follow your healthcare professional's instructions.           Patient Education     Prostate-Specific Antigen (PSA)  Does this test have other names?  PSA  What is this test?  This test measures the level of prostate-specific antigen (PSA) in your blood.  The cells of the prostate gland make the protein called PSA. Men normally have low levels of PSA. If your PSA levels start to rise, it could mean you have one of the below:    Prostate cancer    A benign prostate condition    Inflammation of the prostate    An infection in the prostate  Experts don t all agree on when to have PSA testing. The U.S. Preventative Services Task Force advises men who don't have any  symptoms of prostate cancer to not have a PSA test. The task force says that PSA test results can lead to treating small cancers that would never be life-threatening.  The American Cancer Society advises that men be told the risks and benefits of PSA testing and make their own decision about if and when to be screened.  The American Urologic Society says that PSA screening is most important between the ages of 55 and 69. If you have a brother or father with prostate cancer or you are , you should start testing at age 40.   Why do I need this test?  You may need this test if you are age 50 or older and your healthcare provider wants to screen you for prostate cancer. Some providers advise screening at age 40 or 45 if you have a family history of prostate cancer or other risk factors.  You may also have this test if you have already been diagnosed with prostate cancer. This is so your healthcare provider can check your treatment and see if your cancer has come back.  What other tests might I have along with this test?  Your healthcare provider may also do a digital rectal exam (HUSSEIN). A HUSSEIN is a physical exam of the prostate, not a lab test. For the exam, your provider will place a gloved finger in your rectum and feel the prostate to check for any bumps or abnormal areas. The FDA recommends that a HUSSEIN be done along with a PSA test.  What do my test results mean?  Test results may vary depending on your age, gender, health history, the method used for the test, and other things. Your test results may not mean you have a problem. Ask your healthcare provider what your test results mean for you.  Results are given in nanograms per milliliter, ng/mL.   Results below 4.0 ng/mL are seen as normal. But some healthcare providers may use these age-based results brackets to define normal for you:     Ages 40 to 49: 0-2.5 ng/mL    Ages 50 to 59: 0-3.5 ng/mL    Ages 60 to 69: 0-4.5 ng/mL    Ages 70 to 79: 0-6.5  ng/mL  A rising PSA may mean that you have cancer. But the PSA results alone won't tell your healthcare provider if it's cancer or a benign prostate condition. If your healthcare provider thinks you have cancer, you may need a biopsy of the prostate to confirm the diagnosis.  How is this test done?  The test is done with a blood sample. A needle is used to draw blood from a vein in your arm or hand.  Does this test pose any risks?  Having a blood test with a needle carries some risks. These include bleeding, infection, bruising, and feeling lightheaded. When the needle pricks your arm or hand, you may feel a slight sting or pain. Afterward, the site may be sore.  What might affect my test results?  Test results can be affected by:    An infection    Some medicines    Riding a bicycle before the test    Ejaculation before the test  How do I get ready for this test?  You may need to abstain from sex and not ride a bicycle within 1 to 2 days of the test. In addition, be sure your healthcare provider knows about all medicines, herbs, vitamins, and supplements you are taking. This includes medicines that don't need a prescription and any illegal drugs you may use.    4994-6527 The [employer]. [address] [place] [address], [place] [address]. All rights reserved. This information is not intended as a substitute for professional medical care. Always follow your healthcare professional's instructions.               The patient was actively involved in the decision making process, and all the questions were answered to their satisfaction prior to leaving.

## 2020-01-06 DIAGNOSIS — R97.20 ELEVATED PROSTATE SPECIFIC ANTIGEN (PSA): Primary | ICD-10-CM

## 2020-01-06 NOTE — PROGRESS NOTES
UROLOGY ADULT REFERRAL   [facility]  [phone]  Fax: [phone]     Referral, office notes and labs faxed to [phone], they will contact patient to schedule.     Lucero Escobar

## 2020-01-09 ENCOUNTER — TELEPHONE (OUTPATIENT)
Dept: FAMILY MEDICINE | Facility: CLINIC | Age: 65
End: 2020-01-09

## 2020-01-09 NOTE — TELEPHONE ENCOUNTER
[facility] phone call message- patient requesting results:    Test: Lab    Date of test: 1/3/2020    Additional Comments: would like a call back      OK to leave a message on voice mail? Yes      Primary language: English      needed? No    Call taken on January 9, 2020 at 12:53 PM by Virgil Huizar

## 2020-01-09 NOTE — TELEPHONE ENCOUNTER
Result note relayed to patient. Patient provided with number to [facility] scheduling. Referral sent by referrals coordinator 1/6/20.     Patient had a number of questions about the general protocol for testing for PCA, and what he could expect from his urology visit. Noted that this test is not 100% sensitive nor specific, so a referral to a specialist to determine next steps is appropriate. Patient requesting call from Dr. Bowen to further discuss PSA testing. Routed to Dr. Bowen. ./ELIDA

## 2020-01-10 DIAGNOSIS — Z95.5 S/P CORONARY ARTERY STENT PLACEMENT: ICD-10-CM

## 2020-01-10 DIAGNOSIS — I25.10 CORONARY ARTERY DISEASE INVOLVING NATIVE CORONARY ARTERY OF NATIVE HEART WITHOUT ANGINA PECTORIS: ICD-10-CM

## 2020-01-10 RX ORDER — CLOPIDOGREL BISULFATE 75 MG/1
75 TABLET ORAL DAILY
Qty: 90 TABLET | Refills: 3 | Status: SHIPPED | OUTPATIENT
Start: 2020-01-10 | End: 2022-03-30 | Stop reason: ALTCHOICE

## 2020-01-20 ENCOUNTER — AMBULATORY - HEALTHEAST (OUTPATIENT)
Dept: CARDIOLOGY | Facility: CLINIC | Age: 65
End: 2020-01-20

## 2020-01-20 DIAGNOSIS — Z95.810 ICD (IMPLANTABLE CARDIOVERTER-DEFIBRILLATOR) IN PLACE: ICD-10-CM

## 2020-01-20 DIAGNOSIS — I25.10 CORONARY ARTERY DISEASE DUE TO LIPID RICH PLAQUE: ICD-10-CM

## 2020-01-20 DIAGNOSIS — I25.83 CORONARY ARTERY DISEASE DUE TO LIPID RICH PLAQUE: ICD-10-CM

## 2020-01-20 DIAGNOSIS — I49.01 VENTRICULAR FIBRILLATION (H): ICD-10-CM

## 2020-01-20 ASSESSMENT — MIFFLIN-ST. JEOR: SCORE: 1484.79

## 2020-01-30 ENCOUNTER — TRANSFERRED RECORDS (OUTPATIENT)
Dept: HEALTH INFORMATION MANAGEMENT | Facility: CLINIC | Age: 65
End: 2020-01-30

## 2020-01-31 ENCOUNTER — COMMUNICATION - HEALTHEAST (OUTPATIENT)
Dept: CARDIOLOGY | Facility: CLINIC | Age: 65
End: 2020-01-31

## 2020-02-27 ENCOUNTER — TRANSFERRED RECORDS (OUTPATIENT)
Dept: HEALTH INFORMATION MANAGEMENT | Facility: CLINIC | Age: 65
End: 2020-02-27

## 2020-03-12 ENCOUNTER — TELEPHONE (OUTPATIENT)
Dept: FAMILY MEDICINE | Facility: CLINIC | Age: 65
End: 2020-03-12

## 2020-03-12 NOTE — TELEPHONE ENCOUNTER
[facility] phone call message- patient requesting a refill:    Full Medication Name:     albuterol (PROAIR HFA/PROVENTIL HFA/VENTOLIN HFA) 108 (90 BASE) MCG/ACT Inhaler     Dose:     Inhale 2 puffs into the lungs - Inhalation     Pharmacy confirmed as   [facility] - [place], [place] - [address]  [address]  [facility] [address]  Phone: [phone] Fax: [phone]  : Yes    Additional Comments:     Pt needing a refill    OK to leave a message on voice mail? Yes    Primary language: English      needed? No    Call taken on March 12, 2020 at 11:24 AM by Kamryn Martinez CMA

## 2020-03-13 DIAGNOSIS — R06.2 WHEEZING: Primary | ICD-10-CM

## 2020-03-13 RX ORDER — ALBUTEROL SULFATE 90 UG/1
2 AEROSOL, METERED RESPIRATORY (INHALATION) EVERY 4 HOURS PRN
Qty: 18 G | Refills: 1 | Status: SHIPPED | OUTPATIENT
Start: 2020-03-13

## 2020-04-13 ENCOUNTER — AMBULATORY - HEALTHEAST (OUTPATIENT)
Dept: CARDIOLOGY | Facility: CLINIC | Age: 65
End: 2020-04-13

## 2020-04-13 DIAGNOSIS — I25.5 ISCHEMIC CARDIOMYOPATHY: ICD-10-CM

## 2020-04-13 DIAGNOSIS — I49.01 VENTRICULAR FIBRILLATION (H): ICD-10-CM

## 2020-04-13 DIAGNOSIS — Z95.810 ICD (IMPLANTABLE CARDIOVERTER-DEFIBRILLATOR) IN PLACE: ICD-10-CM

## 2020-05-18 ENCOUNTER — TRANSFERRED RECORDS (OUTPATIENT)
Dept: HEALTH INFORMATION MANAGEMENT | Facility: CLINIC | Age: 65
End: 2020-05-18

## 2020-07-14 ENCOUNTER — AMBULATORY - HEALTHEAST (OUTPATIENT)
Dept: CARDIOLOGY | Facility: CLINIC | Age: 65
End: 2020-07-14

## 2020-07-14 DIAGNOSIS — I25.5 ISCHEMIC CARDIOMYOPATHY: ICD-10-CM

## 2020-07-14 DIAGNOSIS — Z95.810 ICD (IMPLANTABLE CARDIOVERTER-DEFIBRILLATOR) IN PLACE: ICD-10-CM

## 2020-07-30 ENCOUNTER — COMMUNICATION - HEALTHEAST (OUTPATIENT)
Dept: CARDIOLOGY | Facility: CLINIC | Age: 65
End: 2020-07-30

## 2020-07-30 DIAGNOSIS — I42.9 CARDIOMYOPATHY (H): ICD-10-CM

## 2020-10-13 ENCOUNTER — AMBULATORY - HEALTHEAST (OUTPATIENT)
Dept: CARDIOLOGY | Facility: CLINIC | Age: 65
End: 2020-10-13

## 2020-10-13 DIAGNOSIS — Z95.810 ICD (IMPLANTABLE CARDIOVERTER-DEFIBRILLATOR) IN PLACE: ICD-10-CM

## 2020-10-13 DIAGNOSIS — I25.5 ISCHEMIC CARDIOMYOPATHY: ICD-10-CM

## 2020-12-07 DIAGNOSIS — I25.10 CORONARY ARTERY DISEASE INVOLVING NATIVE HEART WITHOUT ANGINA PECTORIS, UNSPECIFIED VESSEL OR LESION TYPE: ICD-10-CM

## 2020-12-07 NOTE — TELEPHONE ENCOUNTER
Patient requests atorvastatin (LIPITOR) 40 MG tablet. Pt's had seen Dr. Bowen in the past, but he is no longer working. Please advise. Thank you.

## 2020-12-10 RX ORDER — ATORVASTATIN CALCIUM 40 MG/1
40 TABLET, FILM COATED ORAL DAILY
Qty: 90 TABLET | Refills: 0 | Status: SHIPPED | OUTPATIENT
Start: 2020-12-10 | End: 2022-04-18

## 2020-12-10 NOTE — TELEPHONE ENCOUNTER
Pt called back again to check on the status of his medication and the best number to reach him at is his cell phone number on file.

## 2020-12-10 NOTE — TELEPHONE ENCOUNTER
Spoke with Cynthia and she will send Dr. Fletcher a message.  In the meantime, I called the patient and let him know that we are working on his RX concern.  Cailin Thompson DNP, BSN, RN, PHN

## 2020-12-31 ENCOUNTER — COMMUNICATION - HEALTHEAST (OUTPATIENT)
Dept: CARDIOLOGY | Facility: CLINIC | Age: 65
End: 2020-12-31

## 2020-12-31 DIAGNOSIS — I42.9 CARDIOMYOPATHY (H): ICD-10-CM

## 2021-01-12 ENCOUNTER — AMBULATORY - HEALTHEAST (OUTPATIENT)
Dept: CARDIOLOGY | Facility: CLINIC | Age: 66
End: 2021-01-12

## 2021-01-12 DIAGNOSIS — I25.5 ISCHEMIC CARDIOMYOPATHY: ICD-10-CM

## 2021-01-12 DIAGNOSIS — Z95.810 ICD (IMPLANTABLE CARDIOVERTER-DEFIBRILLATOR) IN PLACE: ICD-10-CM

## 2021-02-08 ENCOUNTER — COMMUNICATION - HEALTHEAST (OUTPATIENT)
Dept: CARDIOLOGY | Facility: CLINIC | Age: 66
End: 2021-02-08

## 2021-02-10 ENCOUNTER — AMBULATORY - HEALTHEAST (OUTPATIENT)
Dept: CARDIOLOGY | Facility: CLINIC | Age: 66
End: 2021-02-10

## 2021-02-10 DIAGNOSIS — I25.5 ISCHEMIC CARDIOMYOPATHY: ICD-10-CM

## 2021-02-10 DIAGNOSIS — I49.01 VENTRICULAR FIBRILLATION (H): ICD-10-CM

## 2021-02-10 DIAGNOSIS — Z95.810 ICD (IMPLANTABLE CARDIOVERTER-DEFIBRILLATOR) IN PLACE: ICD-10-CM

## 2021-02-10 DIAGNOSIS — E78.00 PURE HYPERCHOLESTEROLEMIA: ICD-10-CM

## 2021-02-10 LAB
CHOLEST SERPL-MCNC: 146 MG/DL
FASTING STATUS PATIENT QL REPORTED: NO
HDLC SERPL-MCNC: 71 MG/DL
LDLC SERPL CALC-MCNC: 66 MG/DL
TRIGL SERPL-MCNC: 46 MG/DL

## 2021-02-10 ASSESSMENT — MIFFLIN-ST. JEOR: SCORE: 1484.79

## 2021-02-11 ENCOUNTER — COMMUNICATION - HEALTHEAST (OUTPATIENT)
Dept: CARDIOLOGY | Facility: CLINIC | Age: 66
End: 2021-02-11

## 2021-02-23 ENCOUNTER — COMMUNICATION - HEALTHEAST (OUTPATIENT)
Dept: CARDIOLOGY | Facility: CLINIC | Age: 66
End: 2021-02-23

## 2021-02-24 ENCOUNTER — OFFICE VISIT - HEALTHEAST (OUTPATIENT)
Dept: CARDIOLOGY | Facility: CLINIC | Age: 66
End: 2021-02-24

## 2021-02-24 DIAGNOSIS — I25.10 CORONARY ARTERY DISEASE DUE TO LIPID RICH PLAQUE: ICD-10-CM

## 2021-02-24 DIAGNOSIS — J45.909 ASTHMA: ICD-10-CM

## 2021-02-24 DIAGNOSIS — I73.00 RAYNAUD'S DISEASE WITHOUT GANGRENE: ICD-10-CM

## 2021-02-24 DIAGNOSIS — I49.01 VENTRICULAR FIBRILLATION (H): ICD-10-CM

## 2021-02-24 DIAGNOSIS — Z95.810 ICD (IMPLANTABLE CARDIOVERTER-DEFIBRILLATOR) IN PLACE: ICD-10-CM

## 2021-02-24 DIAGNOSIS — I25.5 ISCHEMIC CARDIOMYOPATHY: ICD-10-CM

## 2021-02-24 DIAGNOSIS — I21.02 ST ELEVATION MYOCARDIAL INFARCTION INVOLVING LEFT ANTERIOR DESCENDING (LAD) CORONARY ARTERY (H): ICD-10-CM

## 2021-02-24 DIAGNOSIS — E78.00 PURE HYPERCHOLESTEROLEMIA: ICD-10-CM

## 2021-02-24 DIAGNOSIS — I25.83 CORONARY ARTERY DISEASE DUE TO LIPID RICH PLAQUE: ICD-10-CM

## 2021-02-24 ASSESSMENT — MIFFLIN-ST. JEOR: SCORE: 1489.33

## 2021-05-11 ENCOUNTER — AMBULATORY - HEALTHEAST (OUTPATIENT)
Dept: CARDIOLOGY | Facility: CLINIC | Age: 66
End: 2021-05-11

## 2021-05-11 DIAGNOSIS — Z95.810 ICD (IMPLANTABLE CARDIOVERTER-DEFIBRILLATOR) IN PLACE: ICD-10-CM

## 2021-05-11 DIAGNOSIS — I49.01 VENTRICULAR FIBRILLATION (H): ICD-10-CM

## 2021-05-27 VITALS
WEIGHT: 159.8 LBS | HEART RATE: 60 BPM | RESPIRATION RATE: 16 BRPM | DIASTOLIC BLOOD PRESSURE: 72 MMHG | SYSTOLIC BLOOD PRESSURE: 114 MMHG | BODY MASS INDEX: 23.94 KG/M2

## 2021-05-29 NOTE — PROGRESS NOTES
In clinic device check with Device RN.  Please see link for full device report.  Patient was informed of results and next follow up during today's visit.  Also discussed with Mr. Rios that he is overdue for follow-up with Moy Wells and [facility].  Message sent to Device Schedulers to contact Mr. Rios.  He plans to follow-up with Dr. Wells first as his ICD shows normal function and no arrhythmias.

## 2021-05-30 VITALS — HEIGHT: 68 IN | BODY MASS INDEX: 24.04 KG/M2 | WEIGHT: 158.6 LBS

## 2021-05-31 VITALS — BODY MASS INDEX: 24.1 KG/M2 | WEIGHT: 159 LBS | HEIGHT: 68 IN

## 2021-05-31 VITALS — WEIGHT: 157.6 LBS | BODY MASS INDEX: 23.89 KG/M2 | HEIGHT: 68 IN

## 2021-06-02 VITALS — HEIGHT: 68 IN | BODY MASS INDEX: 24.63 KG/M2 | WEIGHT: 162.5 LBS

## 2021-06-03 VITALS
DIASTOLIC BLOOD PRESSURE: 84 MMHG | WEIGHT: 156.38 LBS | RESPIRATION RATE: 16 BRPM | HEART RATE: 54 BPM | BODY MASS INDEX: 23.78 KG/M2 | SYSTOLIC BLOOD PRESSURE: 112 MMHG

## 2021-06-04 VITALS
BODY MASS INDEX: 24.4 KG/M2 | RESPIRATION RATE: 14 BRPM | HEIGHT: 68 IN | WEIGHT: 161 LBS | HEART RATE: 60 BPM | SYSTOLIC BLOOD PRESSURE: 124 MMHG | DIASTOLIC BLOOD PRESSURE: 80 MMHG

## 2021-06-04 NOTE — TELEPHONE ENCOUNTER
Called patient to discuss recent elevation in blood pressure. Patient's Metoprolol Tartrate was discontinued last month, secondary to Reynaud's. He notes he was in for flu shot recently and blood pressure was elevated at 154/99. Patient admits he was anxious about receiving shot and rushing because he thought he was late for appointment. He does not regularly check his blood pressure at home, but has thought about getting home monitor device. He reports he has been feeling very well despite this abnormal reading.    Dr. Wells, patient has device check and visit scheduled on 12/20 with Dr. Katz. He was encouraged to monitor his blood pressure at home to establish a trend. Patient will call if he is getting consistently high readings at home. What else would you recommend for patient? -ejb

## 2021-06-04 NOTE — TELEPHONE ENCOUNTER
----- Message from [place] Monique, RN sent at 12/10/2019 10:50 AM CST -----  Regarding: FW: [facility]AG Pt  Can you see what Dr. Wells recommends?    Thanks!  JW  ----- Message -----  From: Madina Mason  Sent: 12/10/2019   9:58 AM CST  To: [facility] Support Pool  Subject: GAG Pt                                           General phone call:    Caller: Inocente   Primary cardiologist: GAG/[facility]  Detailed reason for call: Patient had a stress test under [facility] orders, and Inocente states it was decided after the test that he could stop taking metoprolol. When Inocente was getting his flu shot at his PCP recently, it was noted his BP was high. Inocente wanted to check with [facility] (ordering provider of Rx) what his recommendations are.    Best phone number: [phone]  Best time to contact: any  Ok to leave a detailed message? yes  Device? yes  Additional Info:

## 2021-06-04 NOTE — TELEPHONE ENCOUNTER
Called patient and left detailed message with Dr. Wells's response. Encouraged patient to call back with further questions/concerns. Left direct call back number for patient. -ejb    ----- Message -----  From: Ozzy Wells MD  Sent: 12/11/2019   3:30 PM CST  To: Roxanna Bauer RN    Rec check bp - here or [facility] etc - and see if better when not streseed

## 2021-06-05 VITALS
WEIGHT: 161 LBS | BODY MASS INDEX: 24.4 KG/M2 | SYSTOLIC BLOOD PRESSURE: 130 MMHG | HEART RATE: 92 BPM | HEIGHT: 68 IN | RESPIRATION RATE: 16 BRPM | DIASTOLIC BLOOD PRESSURE: 80 MMHG

## 2021-06-05 VITALS
OXYGEN SATURATION: 100 % | BODY MASS INDEX: 24.55 KG/M2 | DIASTOLIC BLOOD PRESSURE: 76 MMHG | WEIGHT: 162 LBS | HEART RATE: 69 BPM | SYSTOLIC BLOOD PRESSURE: 116 MMHG | RESPIRATION RATE: 16 BRPM | HEIGHT: 68 IN

## 2021-06-05 NOTE — PROGRESS NOTES
[facility] Heart Care Note    Assessment:  Out of hospital  ventricular fibrillation with citizen resuscitation July 16, 2015-6 days after anterior STEMI-stent       Hypothermia brain salvage with excellent neurologic recovery  ICD ; BSCI; single lead  secondary indications-VF without acute MI; 7-   Single chamber BSCI device 7-  EF < 30% 2015   Coronary artery disease with mid LAD stent for STEMI July  (initial occurrence of cardiac disease)         Coronary angiography 7/16 /2015 showed patent LAD stent and no new changes        Cardiac MRI showed  nontransmural anterior myocardial infarction       Stress nuclear scan November 5, 2015 showed ejection fraction 64% with small anterior apical infarct zone       Stress echocardiogram October 19, 2017 was normal  Stress nuclear scan November 19, 2019-no infarct or ischemia, EF equals 62%-normal study  5. COPD with episodic asthma    5. Hyperlipidemia:  PSA elevation   Plan:    You have done very well over the past year, pursuing activities without limitations  Stress nuclear scan scan November 19, 2019 was excellent.  Did not show any damage to the heart, preserved ejection fraction and no reversible ischemia  PSA is elevated and you are scheduled to see urologist    beta-blocker was recently stopped. However   I would recommend low-dose beta-blockers because of your history of ventricular fibrillation.  Take Toprol 25 mg/day  You seem to tolerate the metoprolol except for some cold extremities  ICD evaluation was excellent  No arrhythmias were seen  Battery good for another 12 years  Have device check through transtelephonic monitoring every 3 months  Return in 1 year for comprehensive cardiac arrhythmia device assessment      Subjective:    I had the opportunity to see.Inocente Rios , who is a 64 y.o. male with a known history of coronary artery disease and out of hospital ventricular fibrillation    With a past year sternalis and has done  very well  Pursues all his activities without limitations remains very active gets quite a bit of exercise at work and also works out  No angina  No awareness of palpitations or arrhythmias  Denies excessive breathlessness no orthopnea PND or pedal edema  Had a stress nuclear scan November 19, 2019.  Showed normal heart rate blood pressure response to exercise with a single PVC  Ejection fraction 60% 2% there was no ischemia or infarct zone  Tells me his cholesterol is controlled I do not see any lab tests  Did have an elevated PSA and is now scheduled to see urologist  Years ago his PSA was 3 now 8.6    He had been on a beta-blocker, had some cold extremities otherwise tolerated medicine well.  Metoprolol was recently stopped by Dr. Cameron since he had been doing so well.  But I thought he should be on a beta-blocker because of his history of ventricular fibrillation so I restarted metoprolol succinate 25 mg daily            Problem List:  Patient Active Problem List   Diagnosis     Asthma     Actinic keratosis     CAD (coronary artery disease)     Hyperlipidemia     STEMI (ST elevation myocardial infarction) (H)     Induced hypothermia, subsequent encounter     Ischemic cardiomyopathy     ICD (implantable cardioverter-defibrillator) in place, Single Chamber (VR)     Observed sleep apnea     Raynaud disease     Inguinal hernia, left     Inguinal hernia, right     Varicose veins of leg with swelling, right     Ventricular fibrillation (H)     Medical History:  Past Medical History:   Diagnosis Date     Anoxic encephalopathy (H) 7/16/15     Asthma      Cardiac arrest (H) 7/16/15     Cardiomyopathy (H)      Cardiomyopathy, ischemic      Coronary artery disease      High cholesterol      Hyperlipidemia      Keratosis      MI (myocardial infarction) (H) 7/10/15    anterior     BECKI (obstructive sleep apnea)     no CPAP     Surgical History:  Past Surgical History:   Procedure Laterality Date     CARDIAC CATHETERIZATION        CORONARY STENT PLACEMENT       coronary stents  7/10/15    mid lad     EP ICD INSERT       icd   7/22/15    dual, [facility]     INSERT / REPLACE / REMOVE PACEMAKER       LAPAROSCOPIC INGUINAL HERNIA REPAIR Bilateral 11/21/2016    Procedure: LAPAROSCOPIC BILATERAL INGUINAL HERNIA REPAIR;  Surgeon: Yung Anderson MD;  Location: [facility];  Service:      [facility]  7/18/2015          Social History:  Social History     Socioeconomic History     Marital status:      Spouse name: Not on file     Number of children: Not on file     Years of education: Not on file     Highest education level: Not on file   Occupational History     Not on file   Social Needs     Financial resource strain: Not on file     Food insecurity:     Worry: Not on file     Inability: Not on file     Transportation needs:     Medical: Not on file     Non-medical: Not on file   Tobacco Use     Smoking status: Never Smoker     Smokeless tobacco: Never Used   Substance and Sexual Activity     Alcohol use: Yes     Alcohol/week: 7.0 standard drinks     Types: 7 Glasses of wine per week     Drug use: No     Sexual activity: Not on file   Lifestyle     Physical activity:     Days per week: Not on file     Minutes per session: Not on file     Stress: Not on file   Relationships     Social connections:     Talks on phone: Not on file     Gets together: Not on file     Attends Gnosticism service: Not on file     Active member of club or organization: Not on file     Attends meetings of clubs or organizations: Not on file     Relationship status: Not on file     Intimate partner violence:     Fear of current or ex partner: Not on file     Emotionally abused: Not on file     Physically abused: Not on file     Forced sexual activity: Not on file   Other Topics Concern     Not on file   Social History Narrative     Not on file       Review of Systems:      General: WNL  Eyes: WNL  Ears/Nose/Throat: WNL  Lungs: WNL  Heart: WNL  Stomach:  "WNL  Bladder: WNL  Muscle/Joints: WNL  Skin: WNL  Nervous System: WNL  Mental Health: WNL     Blood: WNL        Family History:  Family History   Problem Relation Age of Onset     Parkinsonism Mother      Cancer Father      Prostate cancer Father      No Medical Problems Sister      No Medical Problems Sister          Allergies:  No Known Allergies    Medications:  Current Outpatient Medications   Medication Sig Dispense Refill     albuterol (PROVENTIL HFA;VENTOLIN HFA) 90 mcg/actuation inhaler Inhale 2 puffs every 6 (six) hours as needed for wheezing or shortness of breath.       aspirin 81 MG EC tablet Take 81 mg by mouth daily.       atorvastatin (LIPITOR) 40 MG tablet Take 40 mg by mouth daily.       clopidogrel (PLAVIX) 75 mg tablet Take 75 mg by mouth daily.       metoprolol tartrate (LOPRESSOR) 50 MG tablet Take 0.5 tablets (25 mg total) by mouth 2 (two) times a day. 90 tablet 0     sildenafil citrate (VIAGRA ORAL) Take by mouth as needed.       No current facility-administered medications for this visit.          Surgical site  ICD is well-healed to left subclavicular region    Device interrogation  Underlying rhythm is sinus rhythm in the 60s with intact AV conduction    No ventricular arrhythmias  Lead function satisfactory  Battery good for another 12 years    Objective:   Vital signs:  /80 (Patient Site: Left Arm, Patient Position: Sitting, Cuff Size: Adult Regular)   Pulse 60   Resp 14   Ht 5' 8\" (1.727 m)   Wt 161 lb (73 kg)   BMI 24.48 kg/m        Physical Exam:      GENERAL APPEARANCE: Alert, cooperative and in no acute distress.  HEENT: No scleral icterus. No Xanthelasma. Oral mucous membranes pink and moist.  NECK: JVP mary cm. No Hepatojugular reflux. Thyroid not  Palpable  CHEST: clear to auscultation and percussion  CARDIOVASCULAR: S1, S2 without murmur    Brachial, radial  pulses are intact and symmetric.   No carotid bruits noted.  ABDOMEN: Non tender. BS+. No bruits.  EXTREMITIES: " No cyanosis, clubbing or edema.    Lab Results:  LIPIDS:  Lab Results   Component Value Date    CHOL 153 11/08/2017    CHOL 196 07/11/2015    CHOL 271 (H) 09/03/2009     Lab Results   Component Value Date    HDL 59 11/08/2017    HDL 47 07/11/2015    HDL 56 09/03/2009     Lab Results   Component Value Date    LDLCALC 83 11/08/2017    LDLCALC 131 (H) 07/11/2015    LDLCALC 186 (H) 09/03/2009     Lab Results   Component Value Date    TRIG 55 11/08/2017    TRIG 106 07/18/2015    TRIG 92 07/11/2015     No components found for: CHOLHDL    BMP:  Lab Results   Component Value Date    CREATININE 0.96 11/08/2017    BUN 15 11/08/2017     11/08/2017    K 4.7 11/08/2017     (H) 11/08/2017    CO2 26 11/08/2017         This note has been dictated using voice recognition software. Any grammatical or context distortions are unintentional and inherent to the software.  Dorian Katz MD  [facility]  [phone]

## 2021-06-05 NOTE — PROGRESS NOTES
In clinic device check with Device RN and follow-up with Dr. Katz.  Please see link for full device report.  Patient was informed of results and next follow up during today's visit.

## 2021-06-05 NOTE — TELEPHONE ENCOUNTER
Dr. Wells,    Patient needs to have a prostate biopsy next month is required to HOLD Plavix X 7 days prior. Last PCI in 2015, normal NM Exercise Stress in 11/2019. Is patient okay to HOLD Plavix? Please advise? -nenitab

## 2021-06-05 NOTE — PATIENT INSTRUCTIONS - HE
Inocente Rios    Thank you for coming to the [facility] today for a cardiac evaluation  It was my pleasure to see you today  A good contact for any questions would be Jamaica Cartwright  RN @ [phone]    You have done very well over the past year, pursuing activities without limitations  Stress nuclear scan scan November 19, 2019 was excellent.  Did not show any damage to the heart, preserved ejection fraction and no reversible ischemia  PSA is elevated and you are scheduled to see urologist    beta-blocker was recently stopped. However   I would recommend low-dose beta-blockers because of your history of ventricular fibrillation.  Take Toprol 25 mg/day  You seem to tolerate the metoprolol except for some cold extremities  ICD evaluation was excellent  No arrhythmias were seen  Battery good for another 12 years  Have device check through transtelephonic monitoring every 3 months  Return in 1 year for comprehensive cardiac arrhythmia device assessment

## 2021-06-05 NOTE — TELEPHONE ENCOUNTER
----- Message from Shasta Mckeon sent at 1/31/2020  8:39 AM CST -----      Caller: Patient    Primary cardiologist: Dr. Murphy    Detailed reason for call: Patient stated that he is having a procedure done and needs to stop his clopidogrel (PLAVIX) 75 mg tablet and he wasn't sure if there was a procedure. Please advise    Best phone number: [phone]    Best time to contact: today    Ok to leave a detailed message? Yes    Device? yes

## 2021-06-05 NOTE — TELEPHONE ENCOUNTER
----- Message -----  From: Ozzy Wells MD  Sent: 1/31/2020   3:23 PM CST  To: Roxanna Bauer RN     Ok to hold meds      Called and left patient detailed message on identified line with okay to HOLD Plavix x 7 days prior to procedure. Left direct contact information for questions/concerns. -moses

## 2021-06-08 NOTE — TELEPHONE ENCOUNTER
Spoke with patient, reviewed Dr. Leblanc recommendations.  Medication list updated.  No further issues or concerns.  KENYA

## 2021-06-10 NOTE — PROGRESS NOTES
ENABLE MRI Study    Study Purpose: The objective of this study is to collect data to confirm the safety and effectiveness of the ImageReady  MR Conditional Defibrillation System when used in the 1.5T MRI environment under the labeled Conditions of Use     Inocente Rios was given the opportunity to participate in the ENABLE MRI study.     The consent form was reviewed and discussed with the patient.    The study discussion continued with an     introduction of the study purpose and     the qualifications for participation    review of the study procedures    Study visits, length of participation    risks and side effects    benefits (if any)    voluntary nature of participation    confidentiality of records    financial considerations   were also included in the discussion.       The PHASE 2 consent form (version 22 Sep 2016) was signed 04/12/17.     No study procedures were done prior to signing the consent form.    A copy of the signed consent form was placed in the patient s medical record and a copy was given to the patient. The patient also received a copy of the patient information sheet and the ENABLE MRI Patient brochure.    After the patient signed consent the screening ECG/rhythm strip was performed with patients device programmed at [id number].     The patient has the following metallic objects implanted: none    Plan: An enrollment letter will be sent to the patient PMD and we will mail the patient their study enrollment card. I encouraged the patient to keep their MRI card with their device card or insurance card.    iQ Vieira

## 2021-06-13 NOTE — PROGRESS NOTES
[facility] Care Clinic   Outpatient Follow-up evaluation.      Current Outpatient Prescriptions:      albuterol (PROVENTIL HFA;VENTOLIN HFA) 90 mcg/actuation inhaler, Inhale 2 puffs every 6 (six) hours as needed for wheezing or shortness of breath., Disp: , Rfl:      aspirin 81 MG EC tablet, Take 81 mg by mouth daily., Disp: , Rfl:      atorvastatin (LIPITOR) 40 MG tablet, Take 40 mg by mouth daily., Disp: , Rfl:      clopidogrel (PLAVIX) 75 mg tablet, Take 75 mg by mouth daily., Disp: , Rfl:      metoprolol tartrate (LOPRESSOR) 50 MG tablet, Take 0.5 tablets (25 mg total) by mouth daily., Disp: 45 tablet, Rfl: 3        Inocente Rios is a 62 y.o. Male    Chief Complaint   Patient presents with     Follow-up       Diagnoses:  See Problem list  1. Out of hospital ventricular fibrillation with citizen and paramedic resuscitation 7-; about 6 days after AMI/STEMI/stent    ICD implant; secondary indications-VF without acute MI; 7- Single chamber BSCI device  2. Hypothermia brain salvage   3. Coronary artery disease with mid LAD stent for STEMI July  (initial occurrence of cardiac disease)   4. Coronary angiography 7/16 /2015 showed patent LAD stent and no new changes. Nuclear stress test 11/5 showed:1. Lexiscan stress nuclear study is negative for  inducible myocardial ischemia; there is a very small area of distal anterior and anteroapical nontransmural infarction.   2. The ejection fraction is 64%.   5. COPD with episodic asthma    Cardiac MRI found that the patient has nontransmural MI in anterior wall, endomyocardial scar took over 50% of wall thickness. Left ventricular function is recovered to  50%.   4. Uncontrolled hypertension: Continue metoprolol 75 mg Bid, increased Lisinopril to 20 mg daily.    Now substantial hypotension   5. Hyperlipidemia: Continue Crestor.  6. Raynaud  7. Right leg swelling with varicosity 10/2017    I am not sure what to make out of his right lower leg  swelling.  It is possible he could have a superficial thrombophlebitis of the right lower extremity, see if we can arrange an ultrasound of his right leg to assess this.  May relate to an injury to this region.  For now we will continue with current medical therapy and assess his right leg.  He sees Dr. medina born this afternoon and I can see what his impression may be.  I am hoping to see if we can get an ultrasound this morning to better define what issues may be in play.    Recommendations:    1 arrange to see if we can get an ultrasound of his right lower extremity to assess the findings on his right lower leg.  For now will continue with his current medical therapy but I am inclined to discontinue clopidogrel.  We will first arrange a stress test with echo imaging.  If the study shows only the previous anteroapical injury will not need to make any changes.  Continue all other medical therapy.      Subjective:   Patient notes that several weeks ago he had banged his right leg at work.  He does not constructive work.  He did not seem to be aware of much other issues with his leg but then noted a knot that seem to form in his right lower leg.  Since that time is had some prominent varicosities of that extremity although he has varicosities of both lower legs.  The right leg had been swollen on one occasion with edema that seemed to resolve when he got home and rested his leg.  He is not having any pain in the leg unless he actually presses on the the knot in his lower extremity.  No pain in the upper extremity.  No difficulty with breathing chest pain dizziness heart arrhythmia.                    Past Medical History:   Diagnosis Date     Anoxic encephalopathy 7/16/15     Asthma      Cardiac arrest 7/16/15     Cardiomyopathy, ischemic      Coronary artery disease      Hyperlipidemia      Keratosis      MI (myocardial infarction) 7/10/15    anterior     BECKI (obstructive sleep apnea)      Past Surgical History:  "  Procedure Laterality Date     CARDIAC CATHETERIZATION       coronary stents  7/10/15    mid lad     icd   7/22/15    dual, [facility]     INSERT / REPLACE / REMOVE PACEMAKER       LAPAROSCOPIC INGUINAL HERNIA REPAIR Bilateral 11/21/2016    Procedure: LAPAROSCOPIC BILATERAL INGUINAL HERNIA REPAIR;  Surgeon: Yung Anderson MD;  Location: [facility];  Service:      PICC  7/18/2015          No Known Allergies  Family History   Problem Relation Age of Onset     Parkinsonism Mother      Cancer Father      Prostate cancer Father      No Medical Problems Sister      No Medical Problems Sister       Social History     Social History     Marital status:      Spouse name: N/A     Number of children: N/A     Years of education: N/A     Occupational History     Not on file.     Social History Main Topics     Smoking status: Never Smoker     Smokeless tobacco: Never Used     Alcohol use 4.2 oz/week     7 Glasses of wine per week     Drug use: No     Sexual activity: Not on file     Other Topics Concern     Not on file     Social History Narrative     Family history not pertinent to chief complaint or presenting problem    Current Outpatient Prescriptions:      albuterol (PROVENTIL HFA;VENTOLIN HFA) 90 mcg/actuation inhaler, Inhale 2 puffs every 6 (six) hours as needed for wheezing or shortness of breath., Disp: , Rfl:      aspirin 81 MG EC tablet, Take 81 mg by mouth daily., Disp: , Rfl:      atorvastatin (LIPITOR) 40 MG tablet, Take 40 mg by mouth daily., Disp: , Rfl:      clopidogrel (PLAVIX) 75 mg tablet, Take 75 mg by mouth daily., Disp: , Rfl:      metoprolol tartrate (LOPRESSOR) 50 MG tablet, Take 0.5 tablets (25 mg total) by mouth daily., Disp: 45 tablet, Rfl: 3      Objective:   /88 (Patient Site: Left Arm, Patient Position: Sitting, Cuff Size: Adult Large)  Pulse 64  Resp 16  Ht 5' 8\" (1.727 m)  Wt 159 lb (72.1 kg)  BMI 24.18 kg/m2  159 lb (72.1 kg)   Wt Readings from Last 3 Encounters: "   10/13/17 159 lb (72.1 kg)   04/12/17 158 lb 9.6 oz (71.9 kg)   11/21/16 152 lb 11.2 oz (69.3 kg)     BP Readings from Last 3 Encounters:   10/13/17 122/88   04/12/17 102/66   11/22/16 139/87     Pulse Readings from Last 3 Encounters:   10/13/17 64   04/12/17 64   11/22/16 (!) 59     General appearance: alert, appears stated age and cooperative  Head: Normocephalic, without obvious abnormality, atraumatic  Eyes: Normal external exam without jaundice.  Ears: Normal external auricular exam.  Nose: Normal external exam.  Lungs: clear to auscultation bilaterally  Chest wall: no tenderness  Heart: regular rate and rhythm, S1, S2 normal, no murmur, click, rub or gallop normal  Pulses: 2+ and symmetric  Skin: Skin color, texture, turgor normal.   Neurologic: Grossly normal, no focal neurologic findings.  Extremity: Right lower extremity has more prominent varicosities and on the left side.  Both lower extremities with varicosities noted.  There is a 2 x 4 cm mass that is mildly tender.  It is not soft and feels firm.  Adjacent to it is a soft separate but mobile area.  Pulses full in the lower extremity.  No edema identified in the lower extremity.  Review of Systems:   General: WNL  Cardiographics: Reviewed in clinic.    Lab Results:  Lab Results: Personally reviewed  Lab Results   Component Value Date    CHOL 196 07/11/2015    TRIG 106 07/18/2015    HDL 47 07/11/2015       Clinical evaluation time today including exam 30 minutes.  At least 50% of clinic evaluation time involved in assessment and patient counseling.  Part of this chart was created using a dictation software.  Typographic errors, word substitutions, and grammatical errors may unintentionally occur.    Ozzy Wells M.D.  [facility]

## 2021-06-14 NOTE — PROGRESS NOTES
In clinic device check with Dr. Katz.  Please see link for full device report.  Patient was informed of results and next follow up during today's visit.

## 2021-06-14 NOTE — PROGRESS NOTES
[facility] Heart Care Note    Assessment:  Out of hospital  ventricular fibrillation with citizen resuscitation July 16, 2015-6 days after anterior STEMI-stent       Hypothermia brain salvage with excellent neurologic recoveryICD implant; secondary indications-VF without acute MI; 7-   Single chamber BSCI device 7-  EF < 30% 2015   Coronary artery disease with mid LAD stent for STEMI July  (initial occurrence of cardiac disease)         Coronary angiography 7/16 /2015 showed patent LAD stent and no new changes        Cardiac MRI showed  nontransmural anterior myocardial infarction       Stress nuclear scan November 5, 2015 showed ejection fraction 64% with small anterior apical infarct zone       Stress echocardiogram October 19, 2017 was normal  5. COPD with episodic asthma    5. Hyperlipidemia:    Plan:  Device evaluation today is excellent.  There was a short burst of ventricular tachycardia that was self terminating-February 19, 2017.  Otherwise heart rhythm has been quite stable  Stress echocardiogram October 19, 2017 was excellent  Blood work today will include a lipid panel, comprehensive metabolic profile  Ultrasound studies do show superficial phlebitis of the right leg, there is no evidence for deep vein thrombosis-these are generally treated with conservative measures-Such as heat, elevation and compression hose  Device should last another 12 years  Continue to have ICD check through transtelephonic monitoring every 3 months  Return in 1 year for comprehensive cardiac arrhythmia device assessment  Follow  up with Dr. Ozzy Wells in 6 months      Subjective:    I had the opportunity to see.Inocente Rios , who is a 62 y.o. male with a known history of ventricular fibrillation, coronary artery disease and ischemic cardiomyopathy  Overall Mr. Rios has been doing well.  He tries to exercise on a regular basis, works out about 3 times a week at a high level  He has no angina, feels  no excessive breathlessness  He has had some swelling of his right leg and underwent evaluation.  An ultrasound showed superficial phlebitis of his right leg there was no evidence for deep vein thrombosis  He had a stress echocardiogram October 19, 2017.  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  He did have successful double inguinal surgery repair  Only rarely does she take broncho-inhalers      Problem List:  Patient Active Problem List   Diagnosis     Mild Intermittent Asthma     Actinic keratosis     CAD (coronary artery disease)     Hyperlipidemia     STEMI (ST elevation myocardial infarction)     Induced hypothermia, subsequent encounter     Ischemic cardiomyopathy     ICD (implantable cardioverter-defibrillator) in place     Observed sleep apnea     Raynaud disease     Inguinal hernia, left     Inguinal hernia, right     Varicose veins of leg with swelling, right     Medical History:  Past Medical History:   Diagnosis Date     Anoxic encephalopathy 7/16/15     Asthma      Cardiac arrest 7/16/15     Cardiomyopathy      Cardiomyopathy, ischemic      Coronary artery disease      High cholesterol      Hyperlipidemia      Keratosis      MI (myocardial infarction) 7/10/15    anterior     BECKI (obstructive sleep apnea)     no CPAP     Surgical History:  Past Surgical History:   Procedure Laterality Date     CARDIAC CATHETERIZATION       CORONARY STENT  PLACEMENT       coronary stents  7/10/15    mid lad     EP ICD INSERT       icd   7/22/15    dual, [facility]     INSERT / REPLACE / REMOVE PACEMAKER       LAPAROSCOPIC INGUINAL HERNIA REPAIR Bilateral 11/21/2016    Procedure: LAPAROSCOPIC BILATERAL INGUINAL HERNIA REPAIR;  Surgeon: Yung Anderson MD;  Location: [facility];  Service:      [facility]  7/18/2015          Social History:  Social History     Social History     Marital status:      Spouse name: N/A     Number of children: N/A     Years of education: N/A     Occupational History     Not on file.     Social History Main Topics     Smoking status: Never Smoker     Smokeless tobacco: Never Used     Alcohol use 4.2 oz/week     7 Glasses of wine per week     Drug use: No     Sexual activity: Not on file     Other Topics Concern     Not on file     Social History Narrative       Review of Systems:      General: WNL  Eyes: WNL  Ears/Nose/Throat: WNL  Lungs: WNL  Heart: WNL  Stomach: WNL  Bladder: WNL  Muscle/Joints: WNL  Skin: WNL  Nervous System: WNL  Mental Health: WNL     Blood: WNL        Family History:  Family History   Problem Relation Age of Onset     Parkinsonism Mother      Cancer Father      Prostate cancer Father      No Medical Problems Sister      No Medical Problems Sister          Allergies:  No Known Allergies    Medications:  Current Outpatient Prescriptions   Medication Sig Dispense Refill     albuterol (PROVENTIL HFA;VENTOLIN HFA) 90 mcg/actuation inhaler Inhale 2 puffs every 6 (six) hours as needed for wheezing or shortness of breath.       aspirin 81 MG EC tablet Take 81 mg by mouth daily.       atorvastatin (LIPITOR) 40 MG tablet Take 40 mg by mouth daily.       clopidogrel (PLAVIX) 75 mg tablet Take 75 mg by mouth daily.       metoprolol tartrate (LOPRESSOR) 50 MG tablet Take 0.5 tablets (25 mg total) by mouth daily. 45 tablet 3     No current facility-administered medications for this visit.          Surgical site  ICD is  "well-healed left subclavicular site    Device interrogation  Intrinsic rhythm is normal sinus rhythm with intact AV conduction, heart rate equals 80  One episode of nonsustained ventricular tachycardia lasting 7 seconds at about 190 bpm; this occurred around 2 PM on February 19, 2017.  Spontaneous termination  No atrial arrhythmias  Lead function satisfactory  Battery voltage good for another 12 years    Objective:   Vital signs:  /84 (Patient Site: Left Arm, Patient Position: Sitting, Cuff Size: Adult Regular)  Pulse 64  Resp 16  Ht 5' 8\" (1.727 m)  Wt 157 lb 9.6 oz (71.5 kg)  BMI 23.96 kg/m2  Pressure 128 sitting, 134 standing  Heart rate is 64 and regular without ectopics      Physical Exam:      GENERAL APPEARANCE: Alert, cooperative and in no acute distress.  HEENT: No scleral icterus. No Xanthelasma. Oral mucous membranes pink and moist.  NECK: JVP  Normal    No Hepatojugular reflux.   Thyroid not  Palpable  CHEST: clear to auscultation and percussion  CARDIOVASCULAR: S1, S2 without murmur    Brachial, radial  pulses are intact and symmetric.   No carotid bruits noted.  ABDOMEN: Non tender. BS+. No bruits.  EXTREMITIES: No cyanosis, clubbing or edema.    Lab Results:  LIPIDS:  Lab Results   Component Value Date    CHOL 196 07/11/2015    CHOL 271 (H) 09/03/2009     Lab Results   Component Value Date    HDL 47 07/11/2015    HDL 56 09/03/2009     Lab Results   Component Value Date    LDLCALC 131 (H) 07/11/2015    LDLCALC 186 (H) 09/03/2009     Lab Results   Component Value Date    TRIG 106 07/18/2015    TRIG 92 07/11/2015    TRIG 145 09/03/2009     No components found for: CHOLHDL    BMP:  Lab Results   Component Value Date    CREATININE 0.80 07/24/2015    BUN 9 07/24/2015     07/24/2015    K 3.7 07/24/2015     (H) 07/24/2015    CO2 21 (L) 07/24/2015         This note has been dictated using voice recognition software. Any grammatical or context distortions are unintentional and inherent to " the software.  Dorian Katz MD  [facility]  [phone]

## 2021-06-15 NOTE — PROGRESS NOTES
[facility] Heart Care Note      Assessment:  Out of hospital  ventricular fibrillation with citizen resuscitation July 16, 2015-6 days after anterior STEMI-stent       Hypothermia brain salvage with excellent neurologic recovery  ICD ; BSCI; single lead  secondary indications-VF without acute MI; 7-   Single chamber BSCI device 7-  EF < 30% 2015   Coronary artery disease with mid LAD stent for STEMI July  (initial occurrence of cardiac disease)         Coronary angiography 7/16 /2015 showed patent LAD stent and no new changes        Cardiac MRI showed  nontransmural anterior myocardial infarction       Stress nuclear scan November 5, 2015 showed ejection fraction 64% with small anterior apical infarct zone       Stress echocardiogram October 19, 2017 was normal  Stress nuclear scan November 19, 2019-no infarct or ischemia, EF equals 62%-normal study  5. COPD with episodic asthma    5. Hyperlipidemia:  PSA elevation -robotic radical prostatectomy 8/22/2020     Plan:  ICD evaluation is excellent  Battery should last another 12 years  There has been some increase in the RV lead  pacing threshold so we should repeat testing  in 3 months  Blood work today will include a lipid panel  Continue current medications  Have device check through transtelephonic monitoring every 3 months  Return in 1 year for comprehensive cardiac arrhythmia device assessment          Subjective:    I had the opportunity to see.Inocente Rios , who is a 65 y.o. male with a known history of ischemic cardiomyopathy and ventricular fibrillation  Bill has been doing well  Quite active does all his day-to-day activities does not seem to have limitations  No angina  Denies excessive breathlessness  No orthopnea PND pedal edema  No awareness of palpitations or arrhythmias no syncopal or near syncopal episodes  Because of elevated PSA he underwent a radical robotic prostatectomy: This did show prostate cancer apparently nothing  outside the margins so he required no chemotherapy or adjuvant therapy after the surgery  Currently working as a , doing repair jobs  for homeowners          Problem List:  Patient Active Problem List   Diagnosis     Asthma     Actinic keratosis     CAD (coronary artery disease)     Hyperlipidemia     STEMI (ST elevation myocardial infarction) (H)     Induced hypothermia, subsequent encounter     Ischemic cardiomyopathy     ICD (implantable cardioverter-defibrillator) in place, Single Chamber (VR)     Observed sleep apnea     Raynaud disease     Inguinal hernia, left     Inguinal hernia, right     Varicose veins of leg with swelling, right     Ventricular fibrillation (H)     Medical History:  Past Medical History:   Diagnosis Date     Anoxic encephalopathy (H) 7/16/15     Asthma      Cardiac arrest (H) 7/16/15     Cardiomyopathy (H)      Cardiomyopathy, ischemic      Coronary artery disease      High cholesterol      Hyperlipidemia      Keratosis      MI (myocardial infarction) (H) 7/10/15    anterior     BECKI (obstructive sleep apnea)     no CPAP     Surgical History:  Past Surgical History:   Procedure Laterality Date     CARDIAC CATHETERIZATION       CORONARY STENT PLACEMENT       coronary stents  7/10/15    mid lad     EP ICD INSERT       icd   7/22/15    dual, [facility]     INSERT / REPLACE / REMOVE PACEMAKER       LAPAROSCOPIC INGUINAL HERNIA REPAIR Bilateral 11/21/2016    Procedure: LAPAROSCOPIC BILATERAL INGUINAL HERNIA REPAIR;  Surgeon: Yung Anderson MD;  Location: [facility];  Service:      [facility]  7/18/2015          Social History:  Social History     Socioeconomic History     Marital status:      Spouse name: Not on file     Number of children: Not on file     Years of education: Not on file     Highest education level: Not on file   Occupational History     Not on file   Social Needs     Financial resource strain: Not on file     Food insecurity     Worry: Not on file      Inability: Not on file     Transportation needs     Medical: Not on file     Non-medical: Not on file   Tobacco Use     Smoking status: Never Smoker     Smokeless tobacco: Never Used   Substance and Sexual Activity     Alcohol use: Yes     Alcohol/week: 7.0 standard drinks     Types: 7 Glasses of wine per week     Drug use: No     Sexual activity: Not on file   Lifestyle     Physical activity     Days per week: Not on file     Minutes per session: Not on file     Stress: Not on file   Relationships     Social connections     Talks on phone: Not on file     Gets together: Not on file     Attends [employer] service: Not on file     Active member of club or organization: Not on file     Attends meetings of clubs or organizations: Not on file     Relationship status: Not on file     Intimate partner violence     Fear of current or ex partner: Not on file     Emotionally abused: Not on file     Physically abused: Not on file     Forced sexual activity: Not on file   Other Topics Concern     Not on file   Social History Narrative     Not on file       Review of Systems:      General: WNL  Eyes: WNL  Ears/Nose/Throat: Hearing Loss  Lungs: WNL  Heart: WNL  Stomach: WNL  Bladder: WNL  Muscle/Joints: WNL  Skin: WNL  Nervous System: WNL  Mental Health: WNL     Blood: WNL        Family History:  Family History   Problem Relation Age of Onset     Parkinsonism Mother      Cancer Father      Prostate cancer Father      No Medical Problems Sister      No Medical Problems Sister          Allergies:  No Known Allergies    Medications:  Current Outpatient Medications   Medication Sig Dispense Refill     albuterol (PROVENTIL HFA;VENTOLIN HFA) 90 mcg/actuation inhaler Inhale 2 puffs every 6 (six) hours as needed for wheezing or shortness of breath.       aspirin 81 MG EC tablet Take 81 mg by mouth daily.       atorvastatin (LIPITOR) 40 MG tablet Take 40 mg by mouth daily.       metoprolol tartrate (LOPRESSOR) 50 MG tablet TAKE A HALF  "TABLET (25 MG) BY MOUTH TWO TIMES A DAY. 90 tablet 0     sildenafil citrate (VIAGRA ORAL) Take by mouth as needed.       No current facility-administered medications for this visit.          Surgical site  The ICD is well-healed to left subclavicular region    Device interrogation    Rhythm sinus rhythm with intact AV conduction  Almost no ventricular pacing  Battery good for 12 years  No ventricular tachycardia  RV pacing threshold 1.5 V at 0.4 ms is up from a year ago when the pacing threshold was 0.8 V at 1 4 ms impedance was changed from 582 ohms now 649 ohms    Objective:   Vital signs:  /80 (Patient Site: Left Arm, Patient Position: Sitting, Cuff Size: Adult Regular)   Pulse 92   Resp 16   Ht 5' 8\" (1.727 m)   Wt 161 lb (73 kg)   BMI 24.48 kg/m        Physical Exam:      GENERAL APPEARANCE: Alert, cooperative and in no acute distress.  HEENT: No scleral icterus. No Xanthelasma. Oral mucous membranes pink and moist.  NECK: JVP normal cm. No Hepatojugular reflux. Thyroid not  Palpable  CHEST: clear to auscultation and percussion  CARDIOVASCULAR: S1, S2 without murmur    Brachial, radial  pulses are intact and symmetric.   No carotid bruits noted.  ABDOMEN: Non tender. BS+. No bruits.  EXTREMITIES: No cyanosis, clubbing or edema.    Lab Results:  LIPIDS:  Lab Results   Component Value Date    CHOL 153 11/08/2017    CHOL 196 07/11/2015    CHOL 271 (H) 09/03/2009     Lab Results   Component Value Date    HDL 59 11/08/2017    HDL 47 07/11/2015    HDL 56 09/03/2009     Lab Results   Component Value Date    LDLCALC 83 11/08/2017    LDLCALC 131 (H) 07/11/2015    LDLCALC 186 (H) 09/03/2009     Lab Results   Component Value Date    TRIG 55 11/08/2017    TRIG 106 07/18/2015    TRIG 92 07/11/2015     No components found for: CHOLHDL    BMP:  Lab Results   Component Value Date    CREATININE 0.96 11/08/2017    BUN 15 11/08/2017     11/08/2017    K 4.7 11/08/2017     (H) 11/08/2017    CO2 26 11/08/2017 "         This note has been dictated using voice recognition software. Any grammatical or context distortions are unintentional and inherent to the software.  Dorian Katz MD  [facility]  [phone]

## 2021-06-15 NOTE — PATIENT INSTRUCTIONS - HE
Inocente Rios    Thank you for coming to the [facility] today for a cardiac evaluation  It was my pleasure to see you today  A good contact for any questions would be Jamaica Cartwright  RN @ [phone]      Plan:  ICD evaluation is excellent  Battery should last another 12 years  There has been some increase in the RV lead  pacing threshold so we should repeat testing  in 3 months  Blood work today will include a lipid panel  Continue current medications  Have device check through transtelephonic monitoring every 3 months  Return in 1 year for comprehensive cardiac arrhythmia device assessment

## 2021-06-15 NOTE — TELEPHONE ENCOUNTER

## 2021-06-15 NOTE — TELEPHONE ENCOUNTER

## 2021-06-17 NOTE — TELEPHONE ENCOUNTER
Telephone Encounter by Jamaica Monique RN at 5/8/2020  1:15 PM     Author: Jamaica Monique RN Service: -- Author Type: Registered Nurse    Filed: 5/8/2020  1:15 PM Encounter Date: 1/31/2020 Status: Signed    : Jamaica Monique RN (Registered Nurse)       Ozzy Wells, Jamaica Carmona RN    Caller: Unspecified (3 months ago)               OK to hold   In fact can dc plavix at this time

## 2021-06-17 NOTE — PROGRESS NOTES
In clinic device check.  Please see link for full device report.  Patient was informed of results and next follow up during today's visit.    CHRIS UrbanN, RN, CV-BC  Device Nurse  [facility]

## 2021-06-17 NOTE — TELEPHONE ENCOUNTER
Telephone Encounter by Jamaica Cartwright RN at 5/8/2020 10:26 AM     Author: Jamaica Cartwright RN Service: -- Author Type: Registered Nurse    Filed: 5/8/2020 10:28 AM Encounter Date: 1/31/2020 Status: Signed    : Jamaica Cartwright RN (Registered Nurse)       Dr Wells-  Pt is now needing to have surgery for his prostate cancer  Please see your previous recommendations that it was ok tp hold Plavix 7 days prior to his prostate biopsy  Ok to hold Plavix again 7 days prior to his surgery?  Please advise  Thank you,  Mala Anne [facility] Ep Rn Support Pool    Caller: Unspecified (Today, 10:08 AM)               General phone call:   PATIENT IS PLANNING ON HAVING PROSTATE CANCER SURGERY IN THE NEXT 30 DAYS, AND KNOWS HE NEEDS TO BE OFF OF HIS BLOOD THINNER FOR A TIME, HE NEEDS APPROVAL  FROM DR CONNELLY, PLEASE CALL     Caller: PATIENT   Primary cardiologist: DR CONNELLY   Detailed reason for call: SEE ABOVE   New or active symptoms? NO   Best phone number: [phone]   Best time to contact: ANY TIME   Ok to leave a detailed message? YES   Device? YES     Additional Info:

## 2021-06-19 NOTE — LETTER
Letter by Qi Licea at      Author: Qi Licea Service: -- Author Type: --    Filed:  Encounter Date: 9/12/2019 Status: (Other)         Inocente Rios  [address]  [place] [address]      September 12, 2019      Dear Mr. Rios,    RE: Remote Results    We are writing to you regarding your recent Remote ICD check from home. Your transmission was received successfully. Battery status is satisfactory at this time.     Your results are within normal limits.    Your next device appointment will be a remote check on December 16, 2019; this will occur automatically.    To schedule or reschedule, please call [phone] and press 1.    NOTE: If you would like to do an extra transmission, please call [phone] and press 3 to speak to a nurse BEFORE transmitting. This ensures that the Device Clinic staff is aware of the reason you are sending a transmission, and can follow-up with you after it has been reviewed.    We will be checking your implanted device from home (remotely) every three months unless otherwise instructed. We will need to see you in the clinic at least once a year. You may need to be seen in the clinic sooner depending on the results of your check.    Please be aware:    The follow-up schedule is like a Physician prescription.    Your remote monitor is paired to your specific implanted device.      Sincerely,    [facility]

## 2021-06-20 NOTE — LETTER
Letter by Mercedes Hamilton RDCS at      Author: Mercedes Hamilton RDCS Service: -- Author Type: --    Filed:  Encounter Date: 7/14/2020 Status: (Other)         Inocente Rios  [address]  [place] [address]      July 14, 2020      Dear Mr. Rios,    RE: Remote Results    We are writing to you regarding your recent Remote ICD check from home. Your transmission was received successfully. Battery status is satisfactory at this time.     Your results are showing no significant changes.    Your next device appointment will be a remote check on October 13, 2020; this will occur automatically.    To schedule or reschedule, please call [phone] and press 1.    NOTE: If you would like to do an extra transmission, please call [phone] and press 3 to speak to a nurse BEFORE transmitting. This ensures that the Device Clinic staff is aware of the reason you are sending a transmission, and can follow-up with you after it has been reviewed.    We will be checking your implanted device from home (remotely) every three months unless otherwise instructed. We will need to see you in the clinic at least once a year. You may need to be seen in the clinic sooner depending on the results of your check.    Please be aware:    The follow-up schedule is like a Physician prescription.    Your remote monitor is paired to your specific implanted device.      Sincerely,    [facility]

## 2021-06-20 NOTE — LETTER
Letter by Cristina Foote RN at      Author: Cristina Foote RN Service: -- Author Type: --    Filed:  Encounter Date: 4/13/2020 Status: (Other)         Inocente Rios  [address]  [place] [address]      April 13, 2020      Dear Mr. Rios,    RE: Remote Results    We are writing to you regarding your recent Remote ICD check from home. Your transmission was received successfully. Battery status is satisfactory at this time.     Your results are within normal limits.    Your next device appointment will be a remote check on 7/14/2020; this will occur automatically.    To schedule or reschedule, please call [phone] and press 1.    NOTE: If you would like to do an extra transmission, please call [phone] and press 3 to speak to a nurse BEFORE transmitting. This ensures that the Device Clinic staff is aware of the reason you are sending a transmission, and can follow-up with you after it has been reviewed.    We will be checking your implanted device from home (remotely) every three months unless otherwise instructed. We will need to see you in the clinic at least once a year. You may need to be seen in the clinic sooner depending on the results of your check.    Please be aware:    The follow-up schedule is like a Physician prescription.    Your remote monitor is paired to your specific implanted device.      Sincerely,    [facility] Device [facility]        Cheek Interpolation Flap Text: A decision was made to reconstruct the defect utilizing an interpolation axial flap and a staged reconstruction.  A telfa template was made of the defect.  This telfa template was then used to outline the Cheek Interpolation flap.  The donor area for the pedicle flap was then injected with anesthesia.  The flap was excised through the skin and subcutaneous tissue down to the layer of the underlying musculature.  The interpolation flap was carefully excised within this deep plane to maintain its blood supply.  The edges of the donor site were undermined.   The donor site was closed in a primary fashion.  The pedicle was then rotated into position and sutured.  Once the tube was sutured into place, adequate blood supply was confirmed with blanching and refill.  The pedicle was then wrapped with xeroform gauze and dressed appropriately with a telfa and gauze bandage to ensure continued blood supply and protect the attached pedicle.

## 2021-06-21 NOTE — LETTER
Letter by Jamaica Cartwright RN at      Author: Jamaica Cartwright RN Service: -- Author Type: --    Filed:  Encounter Date: 2/11/2021 Status: (Other)         Inocente Rios  [address]  [place] [address]             February 11, 2021         Dear Mr. Rios,    Below are the results from your recent visit:    Resulted Orders   Lipid panel   Result Value Ref Range    Triglycerides 46 <=149 mg/dL    Cholesterol 146 <=199 mg/dL    LDL Calculated 66 <=129 mg/dL    HDL Cholesterol 71 >=40 mg/dL    Patient Fasting > 8hrs? No      Your recent test results are listed above.  Dr Katz has reviewed the results, they are normal, and at this time would not like to make any further changes.    If you have any questions or concerns, please don't hesitate to call.    Sincerely,    Jamaica Cartwright RN  [phone]

## 2021-06-28 NOTE — PROGRESS NOTES
Progress Notes by Ozzy Wells MD at 11/15/2019  8:10 AM     Author: Ozzy Wells MD Service: -- Author Type: Physician    Filed: 11/15/2019  8:52 AM Encounter Date: 11/15/2019 Status: Signed    : Ozzy Wells MD (Physician)           Thank you, Dr. Patel, for asking the [facility] team to see Mr. Inocente Rios to evaluate coronary artery disease.      Assessment/Recommendations   Assessment:    1.  Coronary artery disease.  Acute myocardial infarction 2015.  Acute LAD stent ESTEFANY placement.  2.  Ventricular fibrillation arrest on presentation 2015 acute MI.  ICD implant  3.  COPD with chronic asthmatic bronchiti  4.  Essential hypertension  5.  Hyperlipidemia  6.  Ray node disease  7.  Chronic varicosity of lower extremity history of right leg edema.    Plan:  1.  Given the time since his previous infarction and relatively few warning symptoms preceding it I would be inclined to proceed with a stress nuclear study to reassess his cardiac risk for recurrent ischemia.  2.  Continue with current medical therapy  3.  If no evidence of new changes on his nuclear study I be inclined to discontinue metoprolol at this time       History of Present Illness/Subjective    Mr. Inocente Rios is a 64 y.o. male with a distant previous history of anterior wall myocardial infarction due to LAD stenosis.  He carries a history of sleep apnea, hypercholesterolemia, and ray node disease.  Since this time he has had no recurrent manifestation of ischemic heart disease.  He exercises regularly at the  runs on a treadmill.  Does some minor weight lifting up.  Has had no symptoms or change in exercise performance.  No dyspnea dizziness palpitations or syncope no discharge from his ICD device.  Tolerates his medications well except the metoprolol we have weaned him down to a minimal dose and he continues to have ray node syndrome because of that.  No other new symptoms    ECHOCARDIOGRAM:  2015.  LVEF 25 to 30%.  Anterior wall akinesis.  Nuclear stress test 2015.  Negative for ischemia.  Nontransmural anterior anteroapical infarction.  EF 64%.     Physical Examination Review of Systems   Vitals:    11/15/19 0825   BP: 112/84   Pulse: (!) 54   Resp: 16     Body mass index is 23.78 kg/m .  Wt Readings from Last 5 Encounters:   11/15/19 156 lb 6 oz (70.9 kg)   06/11/19 162 lb 8 oz (73.7 kg)   11/08/17 157 lb 9.6 oz (71.5 kg)   10/13/17 159 lb (72.1 kg)   04/12/17 158 lb 9.6 oz (71.9 kg)     BP Readings from Last 5 Encounters:   11/15/19 112/84   06/11/19 120/80   11/08/17 110/84   10/13/17 122/88   04/12/17 102/66     Pulse Readings from Last 5 Encounters:   11/15/19 (!) 54   06/11/19 84   11/08/17 64   10/13/17 64   04/12/17 64       General:   Alert, appears stated age and cooperative  normocephalic, without obvious abnormality   ENT/Mouth: Membranes moist, no oral lesions or bleeding gums.      EYES:  No scleral icterus, normal conjunctivae   Neck: No carotid bruits or thyromegaly   Chest/Lungs:   Lungs are clear to auscultation, no rales or wheezing,    Cardiovascular:   Regular. Normal first and second heart sounds with no murmurs, rubs, or gallops; No edema bilaterally    Abdomen:  Normal bowel tones   Extremities: No cyanosis or clubbing, pulses intact   Skin: Color, texture normal.    Neurologic: No focal neurologic deficits          General: WNL  Eyes: WNL  Ears/Nose/Throat: Hearing Loss  Lungs: WNL  Heart: WNL  Stomach: WNL  Bladder: WNL  Muscle/Joints: WNL  Skin: WNL  Nervous System: WNL  Mental Health: WNL     Blood: WNL     Medical History  Surgical History Family History Social History   Past Medical History:   Diagnosis Date   ? Anoxic encephalopathy (H) 7/16/15   ? Asthma    ? Cardiac arrest (H) 7/16/15   ? Cardiomyopathy (H)    ? Cardiomyopathy, ischemic    ? Coronary artery disease    ? High cholesterol    ? Hyperlipidemia    ? Keratosis    ? MI (myocardial infarction) (H) 7/10/15     anterior   ? BECKI (obstructive sleep apnea)     no CPAP    Past Surgical History:   Procedure Laterality Date   ? CARDIAC CATHETERIZATION     ? CORONARY STENT PLACEMENT     ? coronary stents  7/10/15    mid lad   ? EP ICD INSERT     ? icd   7/22/15    dual, [facility]   ? INSERT / REPLACE / REMOVE PACEMAKER     ? LAPAROSCOPIC INGUINAL HERNIA REPAIR Bilateral 11/21/2016    Procedure: LAPAROSCOPIC BILATERAL INGUINAL HERNIA REPAIR;  Surgeon: Yung Anderson MD;  Location: [facility];  Service:    ? [facility]  7/18/2015         Family History   Problem Relation Age of Onset   ? Parkinsonism Mother    ? Cancer Father    ? Prostate cancer Father    ? No Medical Problems Sister    ? No Medical Problems Sister     Social History     Socioeconomic History   ? Marital status:      Spouse name: Not on file   ? Number of children: Not on file   ? Years of education: Not on file   ? Highest education level: Not on file   Occupational History   ? Not on file   Social Needs   ? Financial resource strain: Not on file   ? Food insecurity:     Worry: Not on file     Inability: Not on file   ? Transportation needs:     Medical: Not on file     Non-medical: Not on file   Tobacco Use   ? Smoking status: Never Smoker   ? Smokeless tobacco: Never Used   Substance and Sexual Activity   ? Alcohol use: Yes     Alcohol/week: 7.0 standard drinks     Types: 7 Glasses of wine per week   ? Drug use: No   ? Sexual activity: Not on file   Lifestyle   ? Physical activity:     Days per week: Not on file     Minutes per session: Not on file   ? Stress: Not on file   Relationships   ? Social connections:     Talks on phone: Not on file     Gets together: Not on file     Attends [employer] service: Not on file     Active member of club or organization: Not on file     Attends meetings of clubs or organizations: Not on file     Relationship status: Not on file   ? Intimate partner violence:     Fear of current or ex partner: Not on file      Emotionally abused: Not on file     Physically abused: Not on file     Forced sexual activity: Not on file   Other Topics Concern   ? Not on file   Social History Narrative   ? Not on file          Medications  Allergies   Current Outpatient Medications   Medication Sig Dispense Refill   ? albuterol (PROVENTIL HFA;VENTOLIN HFA) 90 mcg/actuation inhaler Inhale 2 puffs every 6 (six) hours as needed for wheezing or shortness of breath.     ? aspirin 81 MG EC tablet Take 81 mg by mouth daily.     ? atorvastatin (LIPITOR) 40 MG tablet Take 40 mg by mouth daily.     ? clopidogrel (PLAVIX) 75 mg tablet Take 75 mg by mouth daily.     ? metoprolol tartrate (LOPRESSOR) 50 MG tablet Take 0.5 tablets (25 mg total) by mouth 2 (two) times a day. 90 tablet 0   ? sildenafil citrate (VIAGRA ORAL) Take by mouth as needed.       No current facility-administered medications for this visit.     No Known Allergies      Lab Results    Chemistry/lipid CBC Cardiac Enzymes/BNP/TSH/INR   Lab Results   Component Value Date    CHOL 153 11/08/2017    HDL 59 11/08/2017    LDLCALC 83 11/08/2017    TRIG 55 11/08/2017    CREATININE 0.96 11/08/2017    BUN 15 11/08/2017    K 4.7 11/08/2017     11/08/2017     (H) 11/08/2017    CO2 26 11/08/2017    Lab Results   Component Value Date    WBC 8.4 07/31/2015    HGB 12.7 (L) 07/31/2015    HCT 37.0 (L) 07/31/2015    MCV 91 07/31/2015     (H) 07/31/2015    Lab Results   Component Value Date    CKMB 46 (HH) 07/11/2015    TROPONINI 1.14 (HH) 07/16/2015    INR 1.31 (H) 07/18/2015         Clinical evaluation time today including exam 30 minutes.  At least 50% of clinic evaluation time involved in assessment and patient counseling.  Part of this chart was created using a dictation software.  Typographic errors, word substitutions, and grammatical errors may unintentionally occur.    Ozzy Wells M.D.  [facility]

## 2021-06-30 NOTE — PROGRESS NOTES
Progress Notes by Ozzy Wells MD at 2/24/2021  8:50 AM     Author: Ozzy Wells MD Service: -- Author Type: Physician    Filed: 2/24/2021  9:22 AM Encounter Date: 2/24/2021 Status: Signed    : Ozzy Wells MD (Physician)         Cardiology Progress Note        Assessment:  History of ventricular fibrillation arrest associated with anterior MI in 2015.  ICD device placement.  Coronary artery disease LAD stent placement.  History of anterior wall myocardial infarction 2015  COPD  Hypercholesterolemia    Recommendations:  Continue with current medical management as outlined.  Doing well displayed on any manifestation of her ischemic heart disease.  Risk factors currently showing excellent control.    Subjective:  Interval History: Patient with a distant history and from echo infarction 6 years ago.  Since that time his had recovery left ventricular dysfunction.  Stress nuclear study 2019 showed EF of 62% with a no ischemic defect identified.  2021 lipid panel shows LDL of 66.  Current Outpatient Medications   Medication Sig Dispense Refill   ? albuterol (PROVENTIL HFA;VENTOLIN HFA) 90 mcg/actuation inhaler Inhale 2 puffs every 6 (six) hours as needed for wheezing or shortness of breath.     ? aspirin 81 MG EC tablet Take 81 mg by mouth daily.     ? atorvastatin (LIPITOR) 40 MG tablet Take 40 mg by mouth daily.     ? metoprolol tartrate (LOPRESSOR) 50 MG tablet TAKE A HALF TABLET (25 MG) BY MOUTH TWO TIMES A DAY. 90 tablet 0   ? sildenafil citrate (VIAGRA ORAL) Take by mouth as needed.       No current facility-administered medications for this visit.          Objective:   Vital signs in last 24 hours:  [unfilled]  Weight:   162 lb (73.5 kg)     Review of Systems:  Pertinent items are noted in HPI.   Family history not pertinent to chief complaint or presenting problem  Physical Exam:  Vitals:    02/24/21 0846   Resp: 16     Head and neck without focal cranial neurologic defects.  JVD not  "distended.  Carotid upstroke normal without bruit.  External eye exam normal without icterus.  External ear exam normal.  Neck without cervical lymphadenopathy or thyromegaly.  /76 (Patient Site: Left Arm, Patient Position: Sitting, Cuff Size: Adult Regular)   Pulse 69   Resp 16   Ht 5' 8\" (1.727 m)   Wt 162 lb (73.5 kg) Comment: With shoes.  SpO2 100%   BMI 24.63 kg/m    General appearance: alert, appears stated age and cooperative  Lungs: clear to auscultation bilaterally  Heart: regular rate and rhythm, S1, S2 normal, no murmur, click, rub or gallop   Abdomen with normal bowel tones.  Skin without rash, ecchymosis, lesions.  Neuromuscular tone normal.  Peripheral pulse intact and equal.  Joints without swelling or erythema.    Wt Readings from Last 3 Encounters:   02/24/21 162 lb (73.5 kg)   02/10/21 161 lb (73 kg)   01/20/20 161 lb (73 kg)     Temp Readings from Last 3 Encounters:   11/21/16 98.5  F (36.9  C) (Temporal)   07/31/15 99.4  F (37.4  C) (Oral)   07/31/15 99.4  F (37.4  C) (Oral)     BP Readings from Last 3 Encounters:   02/10/21 130/80   01/20/20 124/80   11/15/19 112/84     Pulse Readings from Last 3 Encounters:   02/10/21 92   01/20/20 60   11/15/19 (!) 54         Echocardiogram: findings as noted    Lab Results:   Lab results personally reviewed.    Lab Results   Component Value Date     11/08/2017    K 4.7 11/08/2017     (H) 11/08/2017    CO2 26 11/08/2017    BUN 15 11/08/2017    CREATININE 0.96 11/08/2017    CALCIUM 9.0 11/08/2017     INR/Prothrombin Time:   Lab Results   Component Value Date    INR 1.31 (H) 07/18/2015    INR 1.22 (H) 07/17/2015    INR 1.20 (H) 07/17/2015       Current Outpatient Medications:   ?  albuterol (PROVENTIL HFA;VENTOLIN HFA) 90 mcg/actuation inhaler, Inhale 2 puffs every 6 (six) hours as needed for wheezing or shortness of breath., Disp: , Rfl:   ?  aspirin 81 MG EC tablet, Take 81 mg by mouth daily., Disp: , Rfl:   ?  atorvastatin (LIPITOR) 40 " MG tablet, Take 40 mg by mouth daily., Disp: , Rfl:   ?  metoprolol tartrate (LOPRESSOR) 50 MG tablet, TAKE A HALF TABLET (25 MG) BY MOUTH TWO TIMES A DAY., Disp: 90 tablet, Rfl: 0  ?  sildenafil citrate (VIAGRA ORAL), Take by mouth as needed., Disp: , Rfl:   Time spent in clinical evaluation including counseling was 20 minutes.  Part of this chart was created using a dictation software.  Typographic errors, word substitutions, and Grammatical errors may unintentionally occur.

## 2021-07-03 NOTE — ADDENDUM NOTE
Addendum Note by Jamaica Fuentes RN at 5/8/2020  1:17 PM     Author: Jamaica Fuentes RN Service: -- Author Type: Registered Nurse    Filed: 5/8/2020  1:17 PM Encounter Date: 1/31/2020 Status: Signed    : Jamaica Fuentes RN (Registered Nurse)    Addended by: JAMAICA FUENTES on: 5/8/2020 01:17 PM        Modules accepted: Orders

## 2021-07-25 ENCOUNTER — HEALTH MAINTENANCE LETTER (OUTPATIENT)
Age: 66
End: 2021-07-25

## 2021-08-17 ENCOUNTER — ANCILLARY PROCEDURE (OUTPATIENT)
Dept: CARDIOLOGY | Facility: CLINIC | Age: 66
End: 2021-08-17
Attending: INTERNAL MEDICINE
Payer: MEDICARE

## 2021-08-17 DIAGNOSIS — Z95.810 ICD (IMPLANTABLE CARDIOVERTER-DEFIBRILLATOR) IN PLACE: ICD-10-CM

## 2021-08-17 LAB
MDC_IDC_EPISODE_DTM: NORMAL
MDC_IDC_EPISODE_ID: NORMAL
MDC_IDC_EPISODE_TYPE: NORMAL
MDC_IDC_LEAD_IMPLANT_DT: NORMAL
MDC_IDC_LEAD_LOCATION: NORMAL
MDC_IDC_LEAD_LOCATION_DETAIL_1: NORMAL
MDC_IDC_LEAD_MFG: NORMAL
MDC_IDC_LEAD_MODEL: NORMAL
MDC_IDC_LEAD_POLARITY_TYPE: NORMAL
MDC_IDC_LEAD_SERIAL: NORMAL
MDC_IDC_MSMT_BATTERY_DTM: NORMAL
MDC_IDC_MSMT_BATTERY_REMAINING_LONGEVITY: 138 MO
MDC_IDC_MSMT_BATTERY_REMAINING_PERCENTAGE: 100 %
MDC_IDC_MSMT_BATTERY_STATUS: NORMAL
MDC_IDC_MSMT_CAP_CHARGE_DTM: NORMAL
MDC_IDC_MSMT_CAP_CHARGE_DTM: NORMAL
MDC_IDC_MSMT_CAP_CHARGE_ENERGY: 21 J
MDC_IDC_MSMT_CAP_CHARGE_TIME: 3.7 S
MDC_IDC_MSMT_CAP_CHARGE_TIME: 9.9 S
MDC_IDC_MSMT_CAP_CHARGE_TYPE: NORMAL
MDC_IDC_MSMT_CAP_CHARGE_TYPE: NORMAL
MDC_IDC_MSMT_LEADCHNL_RV_IMPEDANCE_VALUE: 776 OHM
MDC_IDC_MSMT_LEADCHNL_RV_PACING_THRESHOLD_AMPLITUDE: 1.2 V
MDC_IDC_MSMT_LEADCHNL_RV_PACING_THRESHOLD_PULSEWIDTH: 0.4 MS
MDC_IDC_PG_IMPLANT_DTM: NORMAL
MDC_IDC_PG_MFG: NORMAL
MDC_IDC_PG_MODEL: NORMAL
MDC_IDC_PG_SERIAL: NORMAL
MDC_IDC_PG_TYPE: NORMAL
MDC_IDC_SESS_CLINIC_NAME: NORMAL
MDC_IDC_SESS_DTM: NORMAL
MDC_IDC_SESS_TYPE: NORMAL
MDC_IDC_SET_BRADY_LOWRATE: 40 {BEATS}/MIN
MDC_IDC_SET_BRADY_MODE: NORMAL
MDC_IDC_SET_LEADCHNL_RV_PACING_AMPLITUDE: 2.3 V
MDC_IDC_SET_LEADCHNL_RV_PACING_POLARITY: NORMAL
MDC_IDC_SET_LEADCHNL_RV_PACING_PULSEWIDTH: 0.4 MS
MDC_IDC_SET_LEADCHNL_RV_SENSING_ADAPTATION_MODE: NORMAL
MDC_IDC_SET_LEADCHNL_RV_SENSING_POLARITY: NORMAL
MDC_IDC_SET_LEADCHNL_RV_SENSING_SENSITIVITY: 0.6 MV
MDC_IDC_SET_ZONE_DETECTION_INTERVAL: 250 MS
MDC_IDC_SET_ZONE_DETECTION_INTERVAL: 324 MS
MDC_IDC_SET_ZONE_TYPE: NORMAL
MDC_IDC_SET_ZONE_TYPE: NORMAL
MDC_IDC_SET_ZONE_VENDOR_TYPE: NORMAL
MDC_IDC_SET_ZONE_VENDOR_TYPE: NORMAL
MDC_IDC_STAT_BRADY_DTM_END: NORMAL
MDC_IDC_STAT_BRADY_DTM_START: NORMAL
MDC_IDC_STAT_BRADY_RV_PERCENT_PACED: 0 %
MDC_IDC_STAT_EPISODE_RECENT_COUNT: 0
MDC_IDC_STAT_EPISODE_RECENT_COUNT_DTM_END: NORMAL
MDC_IDC_STAT_EPISODE_RECENT_COUNT_DTM_START: NORMAL
MDC_IDC_STAT_EPISODE_TYPE: NORMAL
MDC_IDC_STAT_EPISODE_VENDOR_TYPE: NORMAL
MDC_IDC_STAT_TACHYTHERAPY_ATP_DELIVERED_RECENT: 0
MDC_IDC_STAT_TACHYTHERAPY_ATP_DELIVERED_TOTAL: 0
MDC_IDC_STAT_TACHYTHERAPY_RECENT_DTM_END: NORMAL
MDC_IDC_STAT_TACHYTHERAPY_RECENT_DTM_START: NORMAL
MDC_IDC_STAT_TACHYTHERAPY_SHOCKS_ABORTED_RECENT: 0
MDC_IDC_STAT_TACHYTHERAPY_SHOCKS_ABORTED_TOTAL: 0
MDC_IDC_STAT_TACHYTHERAPY_SHOCKS_DELIVERED_RECENT: 0
MDC_IDC_STAT_TACHYTHERAPY_SHOCKS_DELIVERED_TOTAL: 1
MDC_IDC_STAT_TACHYTHERAPY_TOTAL_DTM_END: NORMAL
MDC_IDC_STAT_TACHYTHERAPY_TOTAL_DTM_START: NORMAL

## 2021-08-17 PROCEDURE — 93296 REM INTERROG EVL PM/IDS: CPT | Performed by: INTERNAL MEDICINE

## 2021-08-17 PROCEDURE — 93295 DEV INTERROG REMOTE 1/2/MLT: CPT | Performed by: INTERNAL MEDICINE

## 2021-09-10 ENCOUNTER — LAB REQUISITION (OUTPATIENT)
Dept: LAB | Facility: CLINIC | Age: 66
End: 2021-09-10
Payer: MEDICARE

## 2021-09-10 DIAGNOSIS — I25.10 ATHEROSCLEROTIC HEART DISEASE OF NATIVE CORONARY ARTERY WITHOUT ANGINA PECTORIS: ICD-10-CM

## 2021-09-10 LAB
ALBUMIN SERPL-MCNC: 3.8 G/DL (ref 3.5–5)
ALP SERPL-CCNC: 67 U/L (ref 45–120)
ALT SERPL W P-5'-P-CCNC: 21 U/L (ref 0–45)
ANION GAP SERPL CALCULATED.3IONS-SCNC: 8 MMOL/L (ref 5–18)
AST SERPL W P-5'-P-CCNC: 22 U/L (ref 0–40)
BILIRUB SERPL-MCNC: 0.7 MG/DL (ref 0–1)
BUN SERPL-MCNC: 10 MG/DL (ref 8–22)
CALCIUM SERPL-MCNC: 9.2 MG/DL (ref 8.5–10.5)
CHLORIDE BLD-SCNC: 106 MMOL/L (ref 98–107)
CHOLEST SERPL-MCNC: 139 MG/DL
CO2 SERPL-SCNC: 27 MMOL/L (ref 22–31)
CREAT SERPL-MCNC: 0.99 MG/DL (ref 0.7–1.3)
GFR SERPL CREATININE-BSD FRML MDRD: 79 ML/MIN/1.73M2
GLUCOSE BLD-MCNC: 68 MG/DL (ref 70–125)
HDLC SERPL-MCNC: 65 MG/DL
LDLC SERPL CALC-MCNC: 52 MG/DL
POTASSIUM BLD-SCNC: 4.2 MMOL/L (ref 3.5–5)
PROT SERPL-MCNC: 6.2 G/DL (ref 6–8)
SODIUM SERPL-SCNC: 141 MMOL/L (ref 136–145)
TRIGL SERPL-MCNC: 109 MG/DL

## 2021-09-10 PROCEDURE — 80061 LIPID PANEL: CPT | Mod: ORL | Performed by: FAMILY MEDICINE

## 2021-09-10 PROCEDURE — 36415 COLL VENOUS BLD VENIPUNCTURE: CPT | Mod: ORL | Performed by: FAMILY MEDICINE

## 2021-09-10 PROCEDURE — 80053 COMPREHEN METABOLIC PANEL: CPT | Mod: ORL | Performed by: FAMILY MEDICINE

## 2021-09-19 ENCOUNTER — HEALTH MAINTENANCE LETTER (OUTPATIENT)
Age: 66
End: 2021-09-19

## 2021-11-17 ENCOUNTER — ANCILLARY PROCEDURE (OUTPATIENT)
Dept: CARDIOLOGY | Facility: CLINIC | Age: 66
End: 2021-11-17
Attending: INTERNAL MEDICINE
Payer: MEDICARE

## 2021-11-17 DIAGNOSIS — Z95.810 ICD (IMPLANTABLE CARDIOVERTER-DEFIBRILLATOR) IN PLACE: ICD-10-CM

## 2021-11-17 DIAGNOSIS — I49.01 VF (VENTRICULAR FIBRILLATION) (H): ICD-10-CM

## 2021-11-17 LAB
MDC_IDC_LEAD_IMPLANT_DT: NORMAL
MDC_IDC_LEAD_LOCATION: NORMAL
MDC_IDC_LEAD_LOCATION_DETAIL_1: NORMAL
MDC_IDC_LEAD_MFG: NORMAL
MDC_IDC_LEAD_MODEL: NORMAL
MDC_IDC_LEAD_POLARITY_TYPE: NORMAL
MDC_IDC_LEAD_SERIAL: NORMAL
MDC_IDC_MSMT_BATTERY_DTM: NORMAL
MDC_IDC_MSMT_BATTERY_REMAINING_LONGEVITY: 138 MO
MDC_IDC_MSMT_BATTERY_REMAINING_PERCENTAGE: 100 %
MDC_IDC_MSMT_BATTERY_STATUS: NORMAL
MDC_IDC_MSMT_CAP_CHARGE_DTM: NORMAL
MDC_IDC_MSMT_CAP_CHARGE_DTM: NORMAL
MDC_IDC_MSMT_CAP_CHARGE_ENERGY: 21 J
MDC_IDC_MSMT_CAP_CHARGE_TIME: 3.7 S
MDC_IDC_MSMT_CAP_CHARGE_TIME: 9.9 S
MDC_IDC_MSMT_CAP_CHARGE_TYPE: NORMAL
MDC_IDC_MSMT_CAP_CHARGE_TYPE: NORMAL
MDC_IDC_MSMT_LEADCHNL_RV_IMPEDANCE_VALUE: 775 OHM
MDC_IDC_MSMT_LEADCHNL_RV_PACING_THRESHOLD_AMPLITUDE: 1.2 V
MDC_IDC_MSMT_LEADCHNL_RV_PACING_THRESHOLD_PULSEWIDTH: 0.4 MS
MDC_IDC_PG_IMPLANT_DTM: NORMAL
MDC_IDC_PG_MFG: NORMAL
MDC_IDC_PG_MODEL: NORMAL
MDC_IDC_PG_SERIAL: NORMAL
MDC_IDC_PG_TYPE: NORMAL
MDC_IDC_SESS_CLINIC_NAME: NORMAL
MDC_IDC_SESS_DTM: NORMAL
MDC_IDC_SESS_TYPE: NORMAL
MDC_IDC_SET_BRADY_LOWRATE: 40 {BEATS}/MIN
MDC_IDC_SET_BRADY_MODE: NORMAL
MDC_IDC_SET_LEADCHNL_RV_PACING_AMPLITUDE: 2.3 V
MDC_IDC_SET_LEADCHNL_RV_PACING_POLARITY: NORMAL
MDC_IDC_SET_LEADCHNL_RV_PACING_PULSEWIDTH: 0.4 MS
MDC_IDC_SET_LEADCHNL_RV_SENSING_ADAPTATION_MODE: NORMAL
MDC_IDC_SET_LEADCHNL_RV_SENSING_POLARITY: NORMAL
MDC_IDC_SET_LEADCHNL_RV_SENSING_SENSITIVITY: 0.6 MV
MDC_IDC_SET_ZONE_DETECTION_INTERVAL: 250 MS
MDC_IDC_SET_ZONE_DETECTION_INTERVAL: 324 MS
MDC_IDC_SET_ZONE_TYPE: NORMAL
MDC_IDC_SET_ZONE_TYPE: NORMAL
MDC_IDC_SET_ZONE_VENDOR_TYPE: NORMAL
MDC_IDC_SET_ZONE_VENDOR_TYPE: NORMAL
MDC_IDC_STAT_BRADY_DTM_END: NORMAL
MDC_IDC_STAT_BRADY_DTM_START: NORMAL
MDC_IDC_STAT_BRADY_RV_PERCENT_PACED: 0 %
MDC_IDC_STAT_EPISODE_RECENT_COUNT: 0
MDC_IDC_STAT_EPISODE_RECENT_COUNT_DTM_END: NORMAL
MDC_IDC_STAT_EPISODE_RECENT_COUNT_DTM_START: NORMAL
MDC_IDC_STAT_EPISODE_TYPE: NORMAL
MDC_IDC_STAT_EPISODE_VENDOR_TYPE: NORMAL
MDC_IDC_STAT_TACHYTHERAPY_ATP_DELIVERED_RECENT: 0
MDC_IDC_STAT_TACHYTHERAPY_ATP_DELIVERED_TOTAL: 0
MDC_IDC_STAT_TACHYTHERAPY_RECENT_DTM_END: NORMAL
MDC_IDC_STAT_TACHYTHERAPY_RECENT_DTM_START: NORMAL
MDC_IDC_STAT_TACHYTHERAPY_SHOCKS_ABORTED_RECENT: 0
MDC_IDC_STAT_TACHYTHERAPY_SHOCKS_ABORTED_TOTAL: 0
MDC_IDC_STAT_TACHYTHERAPY_SHOCKS_DELIVERED_RECENT: 0
MDC_IDC_STAT_TACHYTHERAPY_SHOCKS_DELIVERED_TOTAL: 1
MDC_IDC_STAT_TACHYTHERAPY_TOTAL_DTM_END: NORMAL
MDC_IDC_STAT_TACHYTHERAPY_TOTAL_DTM_START: NORMAL

## 2021-11-17 PROCEDURE — 93296 REM INTERROG EVL PM/IDS: CPT | Performed by: INTERNAL MEDICINE

## 2021-11-17 PROCEDURE — 93295 DEV INTERROG REMOTE 1/2/MLT: CPT | Performed by: INTERNAL MEDICINE

## 2022-01-10 ENCOUNTER — ANCILLARY PROCEDURE (OUTPATIENT)
Dept: PET IMAGING | Facility: CLINIC | Age: 67
End: 2022-01-10
Attending: UROLOGY
Payer: COMMERCIAL

## 2022-01-10 DIAGNOSIS — C61 MALIGNANT NEOPLASM OF PROSTATE (H): ICD-10-CM

## 2022-02-22 ENCOUNTER — TELEPHONE (OUTPATIENT)
Dept: FAMILY MEDICINE | Facility: CLINIC | Age: 67
End: 2022-02-22
Payer: COMMERCIAL

## 2022-02-23 NOTE — TELEPHONE ENCOUNTER
Chart reviewed by Ambulatory Quality and Data team    Please abstract the following data from this visit with this patient into the appropriate field in Epic:    Tests that can be patient reported without a hard copy:        Other Tests found in the patient's chart through Chart Review/Care Everywhere:    Colonoscopy done by this group [facility] on this date: 9/11/19    Note to [employer]st[employer]: If this section is blank, no results were found via Chart Review/Care Everywhere.

## 2022-03-07 ENCOUNTER — ANCILLARY PROCEDURE (OUTPATIENT)
Dept: CARDIOLOGY | Facility: CLINIC | Age: 67
End: 2022-03-07
Attending: INTERNAL MEDICINE
Payer: MEDICARE

## 2022-03-07 DIAGNOSIS — Z95.810 ICD (IMPLANTABLE CARDIOVERTER-DEFIBRILLATOR) IN PLACE: ICD-10-CM

## 2022-03-07 DIAGNOSIS — I49.01 VF (VENTRICULAR FIBRILLATION) (H): ICD-10-CM

## 2022-03-07 PROCEDURE — 93296 REM INTERROG EVL PM/IDS: CPT | Performed by: INTERNAL MEDICINE

## 2022-03-07 PROCEDURE — 93295 DEV INTERROG REMOTE 1/2/MLT: CPT | Performed by: INTERNAL MEDICINE

## 2022-03-15 LAB
MDC_IDC_EPISODE_DTM: NORMAL
MDC_IDC_EPISODE_DTM: NORMAL
MDC_IDC_EPISODE_DURATION: 12 S
MDC_IDC_EPISODE_ID: NORMAL
MDC_IDC_EPISODE_ID: NORMAL
MDC_IDC_EPISODE_TYPE: NORMAL
MDC_IDC_EPISODE_TYPE: NORMAL
MDC_IDC_LEAD_IMPLANT_DT: NORMAL
MDC_IDC_LEAD_LOCATION: NORMAL
MDC_IDC_LEAD_LOCATION_DETAIL_1: NORMAL
MDC_IDC_LEAD_MFG: NORMAL
MDC_IDC_LEAD_MODEL: NORMAL
MDC_IDC_LEAD_POLARITY_TYPE: NORMAL
MDC_IDC_LEAD_SERIAL: NORMAL
MDC_IDC_MSMT_BATTERY_DTM: NORMAL
MDC_IDC_MSMT_BATTERY_REMAINING_LONGEVITY: 132 MO
MDC_IDC_MSMT_BATTERY_REMAINING_PERCENTAGE: 100 %
MDC_IDC_MSMT_BATTERY_STATUS: NORMAL
MDC_IDC_MSMT_CAP_CHARGE_DTM: NORMAL
MDC_IDC_MSMT_CAP_CHARGE_DTM: NORMAL
MDC_IDC_MSMT_CAP_CHARGE_ENERGY: 21 J
MDC_IDC_MSMT_CAP_CHARGE_TIME: 3.7 S
MDC_IDC_MSMT_CAP_CHARGE_TIME: 9.9 S
MDC_IDC_MSMT_CAP_CHARGE_TYPE: NORMAL
MDC_IDC_MSMT_CAP_CHARGE_TYPE: NORMAL
MDC_IDC_MSMT_LEADCHNL_RV_IMPEDANCE_VALUE: 767 OHM
MDC_IDC_MSMT_LEADCHNL_RV_PACING_THRESHOLD_AMPLITUDE: 1.2 V
MDC_IDC_MSMT_LEADCHNL_RV_PACING_THRESHOLD_PULSEWIDTH: 0.4 MS
MDC_IDC_PG_IMPLANT_DTM: NORMAL
MDC_IDC_PG_MFG: NORMAL
MDC_IDC_PG_MODEL: NORMAL
MDC_IDC_PG_SERIAL: NORMAL
MDC_IDC_PG_TYPE: NORMAL
MDC_IDC_SESS_CLINIC_NAME: NORMAL
MDC_IDC_SESS_DTM: NORMAL
MDC_IDC_SESS_TYPE: NORMAL
MDC_IDC_SET_BRADY_LOWRATE: 40 {BEATS}/MIN
MDC_IDC_SET_BRADY_MODE: NORMAL
MDC_IDC_SET_LEADCHNL_RV_PACING_AMPLITUDE: 2.3 V
MDC_IDC_SET_LEADCHNL_RV_PACING_POLARITY: NORMAL
MDC_IDC_SET_LEADCHNL_RV_PACING_PULSEWIDTH: 0.4 MS
MDC_IDC_SET_LEADCHNL_RV_SENSING_ADAPTATION_MODE: NORMAL
MDC_IDC_SET_LEADCHNL_RV_SENSING_POLARITY: NORMAL
MDC_IDC_SET_LEADCHNL_RV_SENSING_SENSITIVITY: 0.6 MV
MDC_IDC_SET_ZONE_DETECTION_INTERVAL: 250 MS
MDC_IDC_SET_ZONE_DETECTION_INTERVAL: 324 MS
MDC_IDC_SET_ZONE_TYPE: NORMAL
MDC_IDC_SET_ZONE_TYPE: NORMAL
MDC_IDC_SET_ZONE_VENDOR_TYPE: NORMAL
MDC_IDC_SET_ZONE_VENDOR_TYPE: NORMAL
MDC_IDC_STAT_BRADY_DTM_END: NORMAL
MDC_IDC_STAT_BRADY_DTM_START: NORMAL
MDC_IDC_STAT_BRADY_RV_PERCENT_PACED: 0 %
MDC_IDC_STAT_EPISODE_RECENT_COUNT: 0
MDC_IDC_STAT_EPISODE_RECENT_COUNT: 1
MDC_IDC_STAT_EPISODE_RECENT_COUNT_DTM_END: NORMAL
MDC_IDC_STAT_EPISODE_RECENT_COUNT_DTM_START: NORMAL
MDC_IDC_STAT_EPISODE_TYPE: NORMAL
MDC_IDC_STAT_EPISODE_VENDOR_TYPE: NORMAL
MDC_IDC_STAT_TACHYTHERAPY_ATP_DELIVERED_RECENT: 0
MDC_IDC_STAT_TACHYTHERAPY_ATP_DELIVERED_TOTAL: 0
MDC_IDC_STAT_TACHYTHERAPY_RECENT_DTM_END: NORMAL
MDC_IDC_STAT_TACHYTHERAPY_RECENT_DTM_START: NORMAL
MDC_IDC_STAT_TACHYTHERAPY_SHOCKS_ABORTED_RECENT: 0
MDC_IDC_STAT_TACHYTHERAPY_SHOCKS_ABORTED_TOTAL: 0
MDC_IDC_STAT_TACHYTHERAPY_SHOCKS_DELIVERED_RECENT: 0
MDC_IDC_STAT_TACHYTHERAPY_SHOCKS_DELIVERED_TOTAL: 1
MDC_IDC_STAT_TACHYTHERAPY_TOTAL_DTM_END: NORMAL
MDC_IDC_STAT_TACHYTHERAPY_TOTAL_DTM_START: NORMAL

## 2022-03-30 ENCOUNTER — OFFICE VISIT (OUTPATIENT)
Dept: CARDIOLOGY | Facility: CLINIC | Age: 67
End: 2022-03-30
Payer: MEDICARE

## 2022-03-30 ENCOUNTER — ANCILLARY PROCEDURE (OUTPATIENT)
Dept: CARDIOLOGY | Facility: CLINIC | Age: 67
End: 2022-03-30
Attending: INTERNAL MEDICINE
Payer: MEDICARE

## 2022-03-30 VITALS
BODY MASS INDEX: 24.25 KG/M2 | SYSTOLIC BLOOD PRESSURE: 122 MMHG | WEIGHT: 160 LBS | HEART RATE: 64 BPM | DIASTOLIC BLOOD PRESSURE: 78 MMHG | HEIGHT: 68 IN | RESPIRATION RATE: 16 BRPM

## 2022-03-30 DIAGNOSIS — Z86.74 H/O CARDIAC ARREST: ICD-10-CM

## 2022-03-30 DIAGNOSIS — Z95.5 S/P CORONARY ARTERY STENT PLACEMENT: ICD-10-CM

## 2022-03-30 DIAGNOSIS — Z95.810 S/P ICD (INTERNAL CARDIAC DEFIBRILLATOR) PROCEDURE: ICD-10-CM

## 2022-03-30 DIAGNOSIS — I49.01 VF (VENTRICULAR FIBRILLATION) (H): ICD-10-CM

## 2022-03-30 DIAGNOSIS — Z95.810 ICD (IMPLANTABLE CARDIOVERTER-DEFIBRILLATOR) IN PLACE: ICD-10-CM

## 2022-03-30 DIAGNOSIS — I25.10 CORONARY ARTERY DISEASE INVOLVING NATIVE CORONARY ARTERY OF NATIVE HEART WITHOUT ANGINA PECTORIS: Primary | ICD-10-CM

## 2022-03-30 PROBLEM — C61 PROSTATE CANCER (H): Status: ACTIVE | Noted: 2022-03-30

## 2022-03-30 LAB
MDC_IDC_EPISODE_DTM: NORMAL
MDC_IDC_EPISODE_ID: NORMAL
MDC_IDC_EPISODE_TYPE: NORMAL
MDC_IDC_LEAD_IMPLANT_DT: NORMAL
MDC_IDC_LEAD_LOCATION: NORMAL
MDC_IDC_LEAD_LOCATION_DETAIL_1: NORMAL
MDC_IDC_LEAD_MFG: NORMAL
MDC_IDC_LEAD_MODEL: NORMAL
MDC_IDC_LEAD_POLARITY_TYPE: NORMAL
MDC_IDC_LEAD_SERIAL: NORMAL
MDC_IDC_MSMT_BATTERY_DTM: NORMAL
MDC_IDC_MSMT_BATTERY_REMAINING_LONGEVITY: 132 MO
MDC_IDC_MSMT_BATTERY_REMAINING_PERCENTAGE: 100 %
MDC_IDC_MSMT_BATTERY_STATUS: NORMAL
MDC_IDC_MSMT_CAP_CHARGE_DTM: NORMAL
MDC_IDC_MSMT_CAP_CHARGE_DTM: NORMAL
MDC_IDC_MSMT_CAP_CHARGE_ENERGY: 21 J
MDC_IDC_MSMT_CAP_CHARGE_TIME: 10 S
MDC_IDC_MSMT_CAP_CHARGE_TIME: 3.7 S
MDC_IDC_MSMT_CAP_CHARGE_TYPE: NORMAL
MDC_IDC_MSMT_CAP_CHARGE_TYPE: NORMAL
MDC_IDC_MSMT_LEADCHNL_RV_IMPEDANCE_VALUE: 777 OHM
MDC_IDC_MSMT_LEADCHNL_RV_PACING_THRESHOLD_AMPLITUDE: 1.1 V
MDC_IDC_MSMT_LEADCHNL_RV_PACING_THRESHOLD_PULSEWIDTH: 0.4 MS
MDC_IDC_PG_IMPLANT_DTM: NORMAL
MDC_IDC_PG_MFG: NORMAL
MDC_IDC_PG_MODEL: NORMAL
MDC_IDC_PG_SERIAL: NORMAL
MDC_IDC_PG_TYPE: NORMAL
MDC_IDC_SESS_CLINIC_NAME: NORMAL
MDC_IDC_SESS_DTM: NORMAL
MDC_IDC_SESS_TYPE: NORMAL
MDC_IDC_SET_BRADY_LOWRATE: 40 {BEATS}/MIN
MDC_IDC_SET_BRADY_MODE: NORMAL
MDC_IDC_SET_LEADCHNL_RV_PACING_AMPLITUDE: 2.3 V
MDC_IDC_SET_LEADCHNL_RV_PACING_POLARITY: NORMAL
MDC_IDC_SET_LEADCHNL_RV_PACING_PULSEWIDTH: 0.4 MS
MDC_IDC_SET_LEADCHNL_RV_SENSING_ADAPTATION_MODE: NORMAL
MDC_IDC_SET_LEADCHNL_RV_SENSING_POLARITY: NORMAL
MDC_IDC_SET_LEADCHNL_RV_SENSING_SENSITIVITY: 0.6 MV
MDC_IDC_SET_ZONE_DETECTION_INTERVAL: 250 MS
MDC_IDC_SET_ZONE_DETECTION_INTERVAL: 324 MS
MDC_IDC_SET_ZONE_TYPE: NORMAL
MDC_IDC_SET_ZONE_TYPE: NORMAL
MDC_IDC_SET_ZONE_VENDOR_TYPE: NORMAL
MDC_IDC_SET_ZONE_VENDOR_TYPE: NORMAL
MDC_IDC_STAT_BRADY_DTM_END: NORMAL
MDC_IDC_STAT_BRADY_DTM_START: NORMAL
MDC_IDC_STAT_BRADY_RV_PERCENT_PACED: 0 %
MDC_IDC_STAT_EPISODE_RECENT_COUNT: 0
MDC_IDC_STAT_EPISODE_RECENT_COUNT: 1
MDC_IDC_STAT_EPISODE_RECENT_COUNT_DTM_END: NORMAL
MDC_IDC_STAT_EPISODE_RECENT_COUNT_DTM_START: NORMAL
MDC_IDC_STAT_EPISODE_TYPE: NORMAL
MDC_IDC_STAT_EPISODE_VENDOR_TYPE: NORMAL
MDC_IDC_STAT_TACHYTHERAPY_ATP_DELIVERED_RECENT: 0
MDC_IDC_STAT_TACHYTHERAPY_ATP_DELIVERED_TOTAL: 0
MDC_IDC_STAT_TACHYTHERAPY_RECENT_DTM_END: NORMAL
MDC_IDC_STAT_TACHYTHERAPY_RECENT_DTM_START: NORMAL
MDC_IDC_STAT_TACHYTHERAPY_SHOCKS_ABORTED_RECENT: 0
MDC_IDC_STAT_TACHYTHERAPY_SHOCKS_ABORTED_TOTAL: 0
MDC_IDC_STAT_TACHYTHERAPY_SHOCKS_DELIVERED_RECENT: 0
MDC_IDC_STAT_TACHYTHERAPY_SHOCKS_DELIVERED_TOTAL: 1
MDC_IDC_STAT_TACHYTHERAPY_TOTAL_DTM_END: NORMAL
MDC_IDC_STAT_TACHYTHERAPY_TOTAL_DTM_START: NORMAL

## 2022-03-30 PROCEDURE — 99214 OFFICE O/P EST MOD 30 MIN: CPT | Performed by: INTERNAL MEDICINE

## 2022-03-30 PROCEDURE — 93282 PRGRMG EVAL IMPLANTABLE DFB: CPT | Performed by: INTERNAL MEDICINE

## 2022-03-30 RX ORDER — TADALAFIL 20 MG/1
10 TABLET ORAL
COMMUNITY

## 2022-03-30 NOTE — PROGRESS NOTES
[facility] Note    Assessment:       Assessment:  Out of hospital  ventricular fibrillation with citizen resuscitation July 16, 2015-6 days after anterior STEMI-stent       Hypothermia brain salvage with excellent neurologic recovery  ICD ; BSCI; single lead  secondary indications-VF without acute MI; 7-   Single chamber [employer]SC[employer] device 7-  EF < 30% 2015   Coronary artery disease with mid LAD stent for STEMI July  (initial occurrence of cardiac disease)         Coronary angiography 7/16 /2015 showed patent LAD stent and no new changes        Cardiac MRI showed  nontransmural anterior myocardial infarction       Stress nuclear scan November 5, 2015 showed ejection fraction 64% with small anterior apical infarct zone       Stress echocardiogram October 19, 2017 was normal  Stress nuclear scan November 19, 2019-no infarct or ischemia, EF equals 62%-normal study  5. COPD with episodic asthma    5. Hyperlipidemia: Controlled with statin  PSA elevation -robotic radical prostatectomy 8/22/2020 ; recurrence of elevated PSA; normal PET scan x2-patient scheduled for pelvic radiation 2022      Plan:    The ICD evaluation is excellent  Battery should last another 11 years  Only 1 short burst of arrhythmia lasted a few seconds on January 25-otherwise heart rhythm has been normal  You are scheduled to have radiation for prostate cancer; this should not affect the device  We will continue with routine checks and following radiation we will do a comprehensive ICD assessment make sure there is no receptive anything  Continue current medications  Follow-up with Dr. Wells in 6 months  Get plenty of exercise, eat healthy        Subjective:    I had the opportunity to see.Inocente BUSTILLOS Gabriel , who is a 66 year old male with a known history of devious anterior myocardial infarction  Has good endurance good exercise capacity  No angina  No awareness of palpitations or arrhythmias no syncopal or near syncopal  episodes  On September 10, 2021 his blood tests were excellent  Cholesterol 139 with LDL 52  He has a history of prostate cancer, had a radical prostatectomy but now has elevated PSA.  PET scans in 2 occasions did not localize any cancer but is recommended for radiation which will be starting  Unlikely to affect his ICD will follow closely for any resets etc. and at the end of radiation we will do a comprehensive ICD evaluation    Seen Dr. Ozzy Wells in the past-we will schedule him to follow-up    He has made a substantial recovery from his MI.  One time his ejection fraction was 30%-2015 but more recent studies-2019 have shown ejection fraction 62% with no evidence for infarct        Problem List:  Patient Active Problem List   Diagnosis     CAD (coronary artery disease)     Encephalopathy     S/P ICD (internal cardiac defibrillator) procedure     H/O cardiac arrest     S/P coronary artery stent placement     History of colonoscopy     Medical History:  Past Medical History:   Diagnosis Date     Anoxic encephalopathy (H) 7/16/15     Asthma      Cardiac arrest (H) 7/16/15     Cardiomyopathy (H)      Cardiomyopathy, ischemic      Congestive heart failure, unspecified      Coronary artery disease      Coronary artery disease      Depressive disorder      High cholesterol      Hyperlipidemia      Keratosis      MI (myocardial infarction) (H) 7/10/15    anterior     BECKI (obstructive sleep apnea)     no CPAP     Uncomplicated asthma      Surgical History:  Past Surgical History:   Procedure Laterality Date     CARDIAC CATHETERIZATION       CORONARY STENT PLACEMENT       EP ICD INSERT       HERNIA REPAIR       INSERT / REPLACE / REMOVE PACEMAKER       LAPAROSCOPIC HERNIORRHAPHY INGUINAL Bilateral 11/21/2016    Procedure: LAPAROSCOPIC BILATERAL INGUINAL HERNIA REPAIR;  Surgeon: Yung Anderson MD;  Location: [facility];  Service:      OTHER SURGICAL HISTORY  7/10/15    coronary stentsmid lad     OTHER  SURGICAL HISTORY  7/22/15    icd dual, [place] [facility]     PICC  7/18/2015          Social History:  Social History     Socioeconomic History     Marital status:      Spouse name: Not on file     Number of children: Not on file     Years of education: Not on file     Highest education level: Not on file   Occupational History     Not on file   Tobacco Use     Smoking status: Never Smoker     Smokeless tobacco: Never Used   Substance and Sexual Activity     Alcohol use: Yes     Alcohol/week: 7.0 standard drinks     Drug use: No     Sexual activity: Yes     Partners: Female   Other Topics Concern     Not on file   Social History Narrative     Not on file     Social Determinants of Health     Financial Resource Strain: Not on file   Food Insecurity: Not on file   Transportation Needs: Not on file   Physical Activity: Not on file   Stress: Not on file   Social Connections: Not on file   Intimate Partner Violence: Not on file   Housing Stability: Not on file       Review of Systems   ,         Family History:  Family History   Problem Relation Age of Onset     Prostate Cancer Father      Diabetes No family hx of      Coronary Artery Disease No family hx of      Parkinsonism Mother      Cancer Father      No Known Problems Sister      No Known Problems Sister          Allergies:  No Known Allergies    Medications:  Current Outpatient Medications   Medication Sig Dispense Refill     albuterol (PROAIR HFA/PROVENTIL HFA/VENTOLIN HFA) 108 (90 Base) MCG/ACT inhaler Inhale 2 puffs into the lungs every 4 hours as needed for shortness of breath / dyspnea or wheezing 18 g 1     ASPIRIN PO Take 81 mg by mouth       atorvastatin (LIPITOR) 40 MG tablet TAKE 1 TABLET (40 MG) BY MOUTH DAILY 90 tablet 0     METOPROLOL SUCCINATE ER PO Take 12.5 mg by mouth daily        order for DME Equipment being ordered: Home blood pressure monitor 1 Units 0     sildenafil (VIAGRA) 100 MG tablet Take one half to one tablet as needed. 20 tablet 11  "    tadalafil (CIALIS) 20 MG tablet Take 10 mg by mouth twice a week (Patient not taking: Reported on 3/30/2022)           Surgical site  ICD is well-healed to left subclavicular site; not much tissue overlies the device    Device interrogation  Underlying rhythm is sinus rhythm in the 60s  Battery good for another 11 years  Less than 1% ventricular pacing-single chamber device  1 short nieves, 15 beats of nonsustained ventricular tachycardia occurred January 25-self terminating  Lead function satisfactory RV pacing and sensing is excellent    Objective:   Vital signs:  /78 (BP Location: Right arm, Patient Position: Sitting, Cuff Size: Adult Regular)   Pulse 64   Resp 16   Ht 1.727 m (5' 8\")   Wt 72.6 kg (160 lb)   BMI 24.33 kg/m        Physical Exam:    GENERAL APPEARANCE: Alert, cooperative and in no acute distress.  HEENT: No scleral icterus. No Xanthelasma. Oral mucous membranes pink and moist.  NECK: JVP normal cm. No Hepatojugular reflux. Thyroid not  Palpable  CHEST: clear to auscultation and percussion  CARDIOVASCULAR: S1, S2 without murmur    Brachial, radial  pulses are intact and symmetric.   No carotid bruits noted.  ABDOMEN: Non tender. BS+. No bruits.  EXTREMITIES: No cyanosis, clubbing or edema.    Lab Results:  LIPIDS:  Lab Results   Component Value Date    CHOL 139 09/10/2021    CHOL 146 02/10/2021    CHOL 153 11/08/2017     Lab Results   Component Value Date    HDL 65 09/10/2021    HDL 71 02/10/2021    HDL 59 11/08/2017     No components found for: LDLCALC  Lab Results   Component Value Date    TRIG 109 09/10/2021    TRIG 46 02/10/2021    TRIG 55 11/08/2017     No results found for: CHOLHDL    BMP:  Lab Results   Component Value Date    BUN 10 09/10/2021     09/10/2021    CO2 27 09/10/2021         This note has been dictated using voice recognition software. Any grammatical or context distortions are unintentional and inherent to the software.  Dorian Katz MD  [facility] HEART " [place] [phone]

## 2022-03-30 NOTE — PATIENT INSTRUCTIONS
Inocente Rios    Thank you for coming to the [facility] today for a cardiac evaluation  It was my pleasure to see you today  A good contact for any questions would be Jamaica Cartwright  RN @ [phone]    The ICD evaluation is excellent  Battery should last another 11 years  Only 1 short burst of arrhythmia lasted a few seconds on January 25-otherwise heart rhythm has been normal  You are scheduled to have radiation for prostate cancer; this should not affect the device  We will continue with routine checks and following radiation we will do a comprehensive ICD assessment make sure there is no receptive anything  Continue current medications  Follow-up with Dr. Wells in 6 months  Get plenty of exercise, eat healthy

## 2022-03-30 NOTE — LETTER
3/30/2022    Jessy Fletcher MD  [address] [place] [address]    RE: Inocente Rios       Dear Colleague,     I had the pleasure of seeing Inocente BUSTILLOS Gabriel in the [facility].  [facility] Note    Assessment:       Assessment:  Out of hospital  ventricular fibrillation with citizen resuscitation July 16, 2015-6 days after anterior STEMI-stent       Hypothermia brain salvage with excellent neurologic recovery  ICD ; BSCI; single lead  secondary indications-VF without acute MI; 7-   Single chamber BSCI device 7-  EF < 30% 2015   Coronary artery disease with mid LAD stent for STEMI July  (initial occurrence of cardiac disease)         Coronary angiography 7/16 /2015 showed patent LAD stent and no new changes        Cardiac MRI showed  nontransmural anterior myocardial infarction       Stress nuclear scan November 5, 2015 showed ejection fraction 64% with small anterior apical infarct zone       Stress echocardiogram October 19, 2017 was normal  Stress nuclear scan November 19, 2019-no infarct or ischemia, EF equals 62%-normal study  5. COPD with episodic asthma    5. Hyperlipidemia: Controlled with statin  PSA elevation -robotic radical prostatectomy 8/22/2020 ; recurrence of elevated PSA; normal PET scan x2-patient scheduled for pelvic radiation 2022      Plan:    The ICD evaluation is excellent  Battery should last another 11 years  Only 1 short burst of arrhythmia lasted a few seconds on January 25-otherwise heart rhythm has been normal  You are scheduled to have radiation for prostate cancer; this should not affect the device  We will continue with routine checks and following radiation we will do a comprehensive ICD assessment make sure there is no receptive anything  Continue current medications  Follow-up with Dr. Wells in 6 months  Get plenty of exercise, eat healthy        Subjective:    I had the opportunity to see.Inocente Rios , who is a 66 year  old male with a known history of devious anterior myocardial infarction  Has good endurance good exercise capacity  No angina  No awareness of palpitations or arrhythmias no syncopal or near syncopal episodes  On September 10, 2021 his blood tests were excellent  Cholesterol 139 with LDL 52  He has a history of prostate cancer, had a radical prostatectomy but now has elevated PSA.  PET scans in 2 occasions did not localize any cancer but is recommended for radiation which will be starting  Unlikely to affect his ICD will follow closely for any resets etc. and at the end of radiation we will do a comprehensive ICD evaluation    Seen Dr. Ozzy Wells in the past-we will schedule him to follow-up    He has made a substantial recovery from his MI.  One time his ejection fraction was 30%-2015 but more recent studies-2019 have shown ejection fraction 62% with no evidence for infarct        Problem List:  Patient Active Problem List   Diagnosis     CAD (coronary artery disease)     Encephalopathy     S/P ICD (internal cardiac defibrillator) procedure     H/O cardiac arrest     S/P coronary artery stent placement     History of colonoscopy     Medical History:  Past Medical History:   Diagnosis Date     Anoxic encephalopathy (H) 7/16/15     Asthma      Cardiac arrest (H) 7/16/15     Cardiomyopathy (H)      Cardiomyopathy, ischemic      Congestive heart failure, unspecified      Coronary artery disease      Coronary artery disease      Depressive disorder      High cholesterol      Hyperlipidemia      Keratosis      MI (myocardial infarction) (H) 7/10/15    anterior     BECKI (obstructive sleep apnea)     no CPAP     Uncomplicated asthma      Surgical History:  Past Surgical History:   Procedure Laterality Date     CARDIAC CATHETERIZATION       CORONARY STENT PLACEMENT       EP ICD INSERT       HERNIA REPAIR       INSERT / REPLACE / REMOVE PACEMAKER       LAPAROSCOPIC HERNIORRHAPHY INGUINAL Bilateral 11/21/2016    Procedure:  LAPAROSCOPIC BILATERAL INGUINAL HERNIA REPAIR;  Surgeon: Yung Anderson MD;  Location: [facility];  Service:      OTHER SURGICAL HISTORY  7/10/15    coronary stentsmid lad     OTHER SURGICAL HISTORY  7/22/15    icd dual, [facility]     PICC  7/18/2015          Social History:  Social History     Socioeconomic History     Marital status:      Spouse name: Not on file     Number of children: Not on file     Years of education: Not on file     Highest education level: Not on file   Occupational History     Not on file   Tobacco Use     Smoking status: Never Smoker     Smokeless tobacco: Never Used   Substance and Sexual Activity     Alcohol use: Yes     Alcohol/week: 7.0 standard drinks     Drug use: No     Sexual activity: Yes     Partners: Female   Other Topics Concern     Not on file   Social History Narrative     Not on file     Social Determinants of Health     Financial Resource Strain: Not on file   Food Insecurity: Not on file   Transportation Needs: Not on file   Physical Activity: Not on file   Stress: Not on file   Social Connections: Not on file   Intimate Partner Violence: Not on file   Housing Stability: Not on file       Review of Systems   ,         Family History:  Family History   Problem Relation Age of Onset     Prostate Cancer Father      Diabetes No family hx of      Coronary Artery Disease No family hx of      Parkinsonism Mother      Cancer Father      No Known Problems Sister      No Known Problems Sister          Allergies:  No Known Allergies    Medications:  Current Outpatient Medications   Medication Sig Dispense Refill     albuterol (PROAIR HFA/PROVENTIL HFA/VENTOLIN HFA) 108 (90 Base) MCG/ACT inhaler Inhale 2 puffs into the lungs every 4 hours as needed for shortness of breath / dyspnea or wheezing 18 g 1     ASPIRIN PO Take 81 mg by mouth       atorvastatin (LIPITOR) 40 MG tablet TAKE 1 TABLET (40 MG) BY MOUTH DAILY 90 tablet 0     METOPROLOL SUCCINATE ER PO Take 12.5  "mg by mouth daily        order for DME Equipment being ordered: Home blood pressure monitor 1 Units 0     sildenafil (VIAGRA) 100 MG tablet Take one half to one tablet as needed. 20 tablet 11     tadalafil (CIALIS) 20 MG tablet Take 10 mg by mouth twice a week (Patient not taking: Reported on 3/30/2022)           Surgical site  ICD is well-healed to left subclavicular site; not much tissue overlies the device    Device interrogation  Underlying rhythm is sinus rhythm in the 60s  Battery good for another 11 years  Less than 1% ventricular pacing-single chamber device  1 short nieves, 15 beats of nonsustained ventricular tachycardia occurred January 25-self terminating  Lead function satisfactory RV pacing and sensing is excellent    Objective:   Vital signs:  /78 (BP Location: Right arm, Patient Position: Sitting, Cuff Size: Adult Regular)   Pulse 64   Resp 16   Ht 1.727 m (5' 8\")   Wt 72.6 kg (160 lb)   BMI 24.33 kg/m        Physical Exam:    GENERAL APPEARANCE: Alert, cooperative and in no acute distress.  HEENT: No scleral icterus. No Xanthelasma. Oral mucous membranes pink and moist.  NECK: JVP normal cm. No Hepatojugular reflux. Thyroid not  Palpable  CHEST: clear to auscultation and percussion  CARDIOVASCULAR: S1, S2 without murmur    Brachial, radial  pulses are intact and symmetric.   No carotid bruits noted.  ABDOMEN: Non tender. BS+. No bruits.  EXTREMITIES: No cyanosis, clubbing or edema.    Lab Results:  LIPIDS:  Lab Results   Component Value Date    CHOL 139 09/10/2021    CHOL 146 02/10/2021    CHOL 153 11/08/2017     Lab Results   Component Value Date    HDL 65 09/10/2021    HDL 71 02/10/2021    HDL 59 11/08/2017     No components found for: LDLCALC  Lab Results   Component Value Date    TRIG 109 09/10/2021    TRIG 46 02/10/2021    TRIG 55 11/08/2017     No results found for: CHOLHDL    BMP:  Lab Results   Component Value Date    BUN 10 09/10/2021     09/10/2021    CO2 27 09/10/2021 "         This note has been dictated using voice recognition software. Any grammatical or context distortions are unintentional and inherent to the software.  Dorian Katz MD  [facility]  [phone]      Thank you for allowing me to participate in the care of your patient.      Sincerely,     Dorian Katz MD, MD     [facility]  cc:   Referred Self

## 2022-04-04 ENCOUNTER — TELEPHONE (OUTPATIENT)
Dept: CARDIOLOGY | Facility: CLINIC | Age: 67
End: 2022-04-04
Payer: COMMERCIAL

## 2022-04-04 DIAGNOSIS — I49.01 VENTRICULAR FIBRILLATION (H): ICD-10-CM

## 2022-04-04 DIAGNOSIS — I25.10 CORONARY ARTERY DISEASE INVOLVING NATIVE CORONARY ARTERY OF NATIVE HEART WITHOUT ANGINA PECTORIS: Primary | ICD-10-CM

## 2022-04-04 NOTE — TELEPHONE ENCOUNTER
Refill request sent to Dr. Katz for Metoprolol tartrate 25mg BID from pt pharmacy.    In review of pt chart and recent office visit with Dr. Katz pt was supposed to be taking Metoprolol succinate 12.5mg daily.    Called pt to confirm his current dose he takes.  He stated he has been taking Metoprolol tartrate 25mg daily.    Pt was prescribed Metoprolol Tartrate  25mg BID back on 2/24/21 after seeing Dr. Wells.  But he has not been taking it twice daily.       I discussed with pt and let him know that I would forward to Dr. Katz to see what medication and dose he would recommend him taking as pt states he has never taken Metoprolol succinate 12.5mg daily.    Dr. Katz- please review telephone call, pt agrees to take what you recommend.  He states he has felt fine since taking the Metoprolol tartrate 25mg daily for the last year or so.    Let us know and we will call him back and send medication into pharmacy for him to .    Thanks!    Robyn

## 2022-04-05 RX ORDER — METOPROLOL SUCCINATE 25 MG/1
25 TABLET, EXTENDED RELEASE ORAL DAILY
Qty: 90 TABLET | Refills: 1 | Status: SHIPPED | OUTPATIENT
Start: 2022-04-05 | End: 2022-10-18

## 2022-04-05 NOTE — TELEPHONE ENCOUNTER
Noted.  Phone call to patient, reviewed medication instructions, he is able to read back and states understanding.  New Rx sent to his pharmacy.  Reviewed contact information for further questions.

## 2022-04-05 NOTE — TELEPHONE ENCOUNTER
Dorian Katz MD Holen, Megan, RN 14 hours ago (5:39 PM)     GG    should take metoprolol succinate 25 mg daily as he currently is taking    Message text

## 2022-07-07 ENCOUNTER — ANCILLARY PROCEDURE (OUTPATIENT)
Dept: CARDIOLOGY | Facility: CLINIC | Age: 67
End: 2022-07-07
Attending: INTERNAL MEDICINE
Payer: MEDICARE

## 2022-07-07 DIAGNOSIS — Z95.810 ICD (IMPLANTABLE CARDIOVERTER-DEFIBRILLATOR) IN PLACE: ICD-10-CM

## 2022-07-07 DIAGNOSIS — I49.01 VF (VENTRICULAR FIBRILLATION) (H): ICD-10-CM

## 2022-07-07 LAB
MDC_IDC_EPISODE_DTM: NORMAL
MDC_IDC_EPISODE_ID: NORMAL
MDC_IDC_EPISODE_TYPE: NORMAL
MDC_IDC_LEAD_IMPLANT_DT: NORMAL
MDC_IDC_LEAD_LOCATION: NORMAL
MDC_IDC_LEAD_LOCATION_DETAIL_1: NORMAL
MDC_IDC_LEAD_MFG: NORMAL
MDC_IDC_LEAD_MODEL: NORMAL
MDC_IDC_LEAD_POLARITY_TYPE: NORMAL
MDC_IDC_LEAD_SERIAL: NORMAL
MDC_IDC_MSMT_BATTERY_DTM: NORMAL
MDC_IDC_MSMT_BATTERY_REMAINING_LONGEVITY: 126 MO
MDC_IDC_MSMT_BATTERY_REMAINING_PERCENTAGE: 100 %
MDC_IDC_MSMT_BATTERY_STATUS: NORMAL
MDC_IDC_MSMT_CAP_CHARGE_DTM: NORMAL
MDC_IDC_MSMT_CAP_CHARGE_DTM: NORMAL
MDC_IDC_MSMT_CAP_CHARGE_ENERGY: 21 J
MDC_IDC_MSMT_CAP_CHARGE_TIME: 10 S
MDC_IDC_MSMT_CAP_CHARGE_TIME: 3.7 S
MDC_IDC_MSMT_CAP_CHARGE_TYPE: NORMAL
MDC_IDC_MSMT_CAP_CHARGE_TYPE: NORMAL
MDC_IDC_MSMT_LEADCHNL_RV_IMPEDANCE_VALUE: 782 OHM
MDC_IDC_MSMT_LEADCHNL_RV_PACING_THRESHOLD_AMPLITUDE: 1.1 V
MDC_IDC_MSMT_LEADCHNL_RV_PACING_THRESHOLD_PULSEWIDTH: 0.4 MS
MDC_IDC_PG_IMPLANT_DTM: NORMAL
MDC_IDC_PG_MFG: NORMAL
MDC_IDC_PG_MODEL: NORMAL
MDC_IDC_PG_SERIAL: NORMAL
MDC_IDC_PG_TYPE: NORMAL
MDC_IDC_SESS_CLINIC_NAME: NORMAL
MDC_IDC_SESS_DTM: NORMAL
MDC_IDC_SESS_TYPE: NORMAL
MDC_IDC_SET_BRADY_LOWRATE: 40 {BEATS}/MIN
MDC_IDC_SET_BRADY_MODE: NORMAL
MDC_IDC_SET_LEADCHNL_RV_PACING_AMPLITUDE: 2.3 V
MDC_IDC_SET_LEADCHNL_RV_PACING_POLARITY: NORMAL
MDC_IDC_SET_LEADCHNL_RV_PACING_PULSEWIDTH: 0.4 MS
MDC_IDC_SET_LEADCHNL_RV_SENSING_ADAPTATION_MODE: NORMAL
MDC_IDC_SET_LEADCHNL_RV_SENSING_POLARITY: NORMAL
MDC_IDC_SET_LEADCHNL_RV_SENSING_SENSITIVITY: 0.6 MV
MDC_IDC_SET_ZONE_DETECTION_INTERVAL: 250 MS
MDC_IDC_SET_ZONE_DETECTION_INTERVAL: 324 MS
MDC_IDC_SET_ZONE_TYPE: NORMAL
MDC_IDC_SET_ZONE_TYPE: NORMAL
MDC_IDC_SET_ZONE_VENDOR_TYPE: NORMAL
MDC_IDC_SET_ZONE_VENDOR_TYPE: NORMAL
MDC_IDC_STAT_BRADY_DTM_END: NORMAL
MDC_IDC_STAT_BRADY_DTM_START: NORMAL
MDC_IDC_STAT_BRADY_RV_PERCENT_PACED: 0 %
MDC_IDC_STAT_EPISODE_RECENT_COUNT: 0
MDC_IDC_STAT_EPISODE_RECENT_COUNT_DTM_END: NORMAL
MDC_IDC_STAT_EPISODE_RECENT_COUNT_DTM_START: NORMAL
MDC_IDC_STAT_EPISODE_TYPE: NORMAL
MDC_IDC_STAT_EPISODE_VENDOR_TYPE: NORMAL
MDC_IDC_STAT_TACHYTHERAPY_ATP_DELIVERED_RECENT: 0
MDC_IDC_STAT_TACHYTHERAPY_ATP_DELIVERED_TOTAL: 0
MDC_IDC_STAT_TACHYTHERAPY_RECENT_DTM_END: NORMAL
MDC_IDC_STAT_TACHYTHERAPY_RECENT_DTM_START: NORMAL
MDC_IDC_STAT_TACHYTHERAPY_SHOCKS_ABORTED_RECENT: 0
MDC_IDC_STAT_TACHYTHERAPY_SHOCKS_ABORTED_TOTAL: 0
MDC_IDC_STAT_TACHYTHERAPY_SHOCKS_DELIVERED_RECENT: 0
MDC_IDC_STAT_TACHYTHERAPY_SHOCKS_DELIVERED_TOTAL: 1
MDC_IDC_STAT_TACHYTHERAPY_TOTAL_DTM_END: NORMAL
MDC_IDC_STAT_TACHYTHERAPY_TOTAL_DTM_START: NORMAL

## 2022-07-07 PROCEDURE — 93296 REM INTERROG EVL PM/IDS: CPT | Performed by: INTERNAL MEDICINE

## 2022-07-07 PROCEDURE — 93295 DEV INTERROG REMOTE 1/2/MLT: CPT | Performed by: INTERNAL MEDICINE

## 2022-07-08 ENCOUNTER — LAB REQUISITION (OUTPATIENT)
Dept: LAB | Facility: CLINIC | Age: 67
End: 2022-07-08
Payer: MEDICARE

## 2022-07-08 DIAGNOSIS — Z13.29 ENCOUNTER FOR SCREENING FOR OTHER SUSPECTED ENDOCRINE DISORDER: ICD-10-CM

## 2022-07-08 LAB — PROLACTIN SERPL 3RD IS-MCNC: 10 NG/ML (ref 4–15)

## 2022-07-08 PROCEDURE — 84146 ASSAY OF PROLACTIN: CPT | Mod: ORL | Performed by: PHYSICIAN ASSISTANT

## 2022-07-11 DIAGNOSIS — I25.10 CORONARY ARTERY DISEASE INVOLVING NATIVE HEART WITHOUT ANGINA PECTORIS, UNSPECIFIED VESSEL OR LESION TYPE: ICD-10-CM

## 2022-07-11 RX ORDER — ATORVASTATIN CALCIUM 40 MG/1
TABLET, FILM COATED ORAL
Qty: 90 TABLET | Refills: 1 | Status: SHIPPED | OUTPATIENT
Start: 2022-07-11 | End: 2023-02-07

## 2022-08-21 ENCOUNTER — HEALTH MAINTENANCE LETTER (OUTPATIENT)
Age: 67
End: 2022-08-21

## 2022-10-17 ENCOUNTER — ANCILLARY PROCEDURE (OUTPATIENT)
Dept: CARDIOLOGY | Facility: CLINIC | Age: 67
End: 2022-10-17
Attending: INTERNAL MEDICINE
Payer: MEDICARE

## 2022-10-17 DIAGNOSIS — I49.01 VF (VENTRICULAR FIBRILLATION) (H): ICD-10-CM

## 2022-10-17 DIAGNOSIS — Z95.810 ICD (IMPLANTABLE CARDIOVERTER-DEFIBRILLATOR) IN PLACE: ICD-10-CM

## 2022-10-17 LAB
MDC_IDC_EPISODE_DTM: NORMAL
MDC_IDC_EPISODE_ID: NORMAL
MDC_IDC_EPISODE_TYPE: NORMAL
MDC_IDC_LEAD_IMPLANT_DT: NORMAL
MDC_IDC_LEAD_LOCATION: NORMAL
MDC_IDC_LEAD_LOCATION_DETAIL_1: NORMAL
MDC_IDC_LEAD_MFG: NORMAL
MDC_IDC_LEAD_MODEL: NORMAL
MDC_IDC_LEAD_POLARITY_TYPE: NORMAL
MDC_IDC_LEAD_SERIAL: NORMAL
MDC_IDC_MSMT_BATTERY_DTM: NORMAL
MDC_IDC_MSMT_BATTERY_REMAINING_LONGEVITY: 120 MO
MDC_IDC_MSMT_BATTERY_REMAINING_PERCENTAGE: 100 %
MDC_IDC_MSMT_BATTERY_STATUS: NORMAL
MDC_IDC_MSMT_CAP_CHARGE_DTM: NORMAL
MDC_IDC_MSMT_CAP_CHARGE_DTM: NORMAL
MDC_IDC_MSMT_CAP_CHARGE_ENERGY: 21 J
MDC_IDC_MSMT_CAP_CHARGE_TIME: 10.1 S
MDC_IDC_MSMT_CAP_CHARGE_TIME: 3.7 S
MDC_IDC_MSMT_CAP_CHARGE_TYPE: NORMAL
MDC_IDC_MSMT_CAP_CHARGE_TYPE: NORMAL
MDC_IDC_MSMT_LEADCHNL_RV_IMPEDANCE_VALUE: 782 OHM
MDC_IDC_MSMT_LEADCHNL_RV_PACING_THRESHOLD_AMPLITUDE: 1.1 V
MDC_IDC_MSMT_LEADCHNL_RV_PACING_THRESHOLD_PULSEWIDTH: 0.4 MS
MDC_IDC_PG_IMPLANT_DTM: NORMAL
MDC_IDC_PG_MFG: NORMAL
MDC_IDC_PG_MODEL: NORMAL
MDC_IDC_PG_SERIAL: NORMAL
MDC_IDC_PG_TYPE: NORMAL
MDC_IDC_SESS_CLINIC_NAME: NORMAL
MDC_IDC_SESS_DTM: NORMAL
MDC_IDC_SESS_TYPE: NORMAL
MDC_IDC_SET_BRADY_LOWRATE: 40 {BEATS}/MIN
MDC_IDC_SET_BRADY_MODE: NORMAL
MDC_IDC_SET_LEADCHNL_RV_PACING_AMPLITUDE: 2.3 V
MDC_IDC_SET_LEADCHNL_RV_PACING_POLARITY: NORMAL
MDC_IDC_SET_LEADCHNL_RV_PACING_PULSEWIDTH: 0.4 MS
MDC_IDC_SET_LEADCHNL_RV_SENSING_ADAPTATION_MODE: NORMAL
MDC_IDC_SET_LEADCHNL_RV_SENSING_POLARITY: NORMAL
MDC_IDC_SET_LEADCHNL_RV_SENSING_SENSITIVITY: 0.6 MV
MDC_IDC_SET_ZONE_DETECTION_INTERVAL: 250 MS
MDC_IDC_SET_ZONE_DETECTION_INTERVAL: 324 MS
MDC_IDC_SET_ZONE_TYPE: NORMAL
MDC_IDC_SET_ZONE_TYPE: NORMAL
MDC_IDC_SET_ZONE_VENDOR_TYPE: NORMAL
MDC_IDC_SET_ZONE_VENDOR_TYPE: NORMAL
MDC_IDC_STAT_BRADY_DTM_END: NORMAL
MDC_IDC_STAT_BRADY_DTM_START: NORMAL
MDC_IDC_STAT_BRADY_RV_PERCENT_PACED: 0 %
MDC_IDC_STAT_EPISODE_RECENT_COUNT: 0
MDC_IDC_STAT_EPISODE_RECENT_COUNT_DTM_END: NORMAL
MDC_IDC_STAT_EPISODE_RECENT_COUNT_DTM_START: NORMAL
MDC_IDC_STAT_EPISODE_TYPE: NORMAL
MDC_IDC_STAT_EPISODE_VENDOR_TYPE: NORMAL
MDC_IDC_STAT_TACHYTHERAPY_ATP_DELIVERED_RECENT: 0
MDC_IDC_STAT_TACHYTHERAPY_ATP_DELIVERED_TOTAL: 0
MDC_IDC_STAT_TACHYTHERAPY_RECENT_DTM_END: NORMAL
MDC_IDC_STAT_TACHYTHERAPY_RECENT_DTM_START: NORMAL
MDC_IDC_STAT_TACHYTHERAPY_SHOCKS_ABORTED_RECENT: 0
MDC_IDC_STAT_TACHYTHERAPY_SHOCKS_ABORTED_TOTAL: 0
MDC_IDC_STAT_TACHYTHERAPY_SHOCKS_DELIVERED_RECENT: 0
MDC_IDC_STAT_TACHYTHERAPY_SHOCKS_DELIVERED_TOTAL: 1
MDC_IDC_STAT_TACHYTHERAPY_TOTAL_DTM_END: NORMAL
MDC_IDC_STAT_TACHYTHERAPY_TOTAL_DTM_START: NORMAL

## 2022-10-17 PROCEDURE — 93295 DEV INTERROG REMOTE 1/2/MLT: CPT | Performed by: INTERNAL MEDICINE

## 2022-10-17 PROCEDURE — 93296 REM INTERROG EVL PM/IDS: CPT | Performed by: INTERNAL MEDICINE

## 2022-11-10 ENCOUNTER — OFFICE VISIT (OUTPATIENT)
Dept: CARDIOLOGY | Facility: CLINIC | Age: 67
End: 2022-11-10
Payer: MEDICARE

## 2022-11-10 VITALS
HEIGHT: 68 IN | BODY MASS INDEX: 24.61 KG/M2 | HEART RATE: 64 BPM | SYSTOLIC BLOOD PRESSURE: 116 MMHG | RESPIRATION RATE: 12 BRPM | WEIGHT: 162.4 LBS | DIASTOLIC BLOOD PRESSURE: 78 MMHG

## 2022-11-10 DIAGNOSIS — I46.9 CARDIAC ARREST (H): ICD-10-CM

## 2022-11-10 DIAGNOSIS — I21.09: Primary | ICD-10-CM

## 2022-11-10 LAB
ALBUMIN SERPL BCG-MCNC: 4.4 G/DL (ref 3.5–5.2)
ALP SERPL-CCNC: 70 U/L (ref 40–129)
ALT SERPL W P-5'-P-CCNC: 27 U/L (ref 10–50)
AST SERPL W P-5'-P-CCNC: 32 U/L (ref 10–50)
BILIRUB DIRECT SERPL-MCNC: <0.2 MG/DL (ref 0–0.3)
BILIRUB SERPL-MCNC: 0.7 MG/DL
CHOLEST SERPL-MCNC: 146 MG/DL
HDLC SERPL-MCNC: 75 MG/DL
LDLC SERPL CALC-MCNC: 59 MG/DL
NONHDLC SERPL-MCNC: 71 MG/DL
PROT SERPL-MCNC: 6.2 G/DL (ref 6.4–8.3)
TRIGL SERPL-MCNC: 59 MG/DL

## 2022-11-10 PROCEDURE — 80061 LIPID PANEL: CPT | Performed by: INTERNAL MEDICINE

## 2022-11-10 PROCEDURE — 99213 OFFICE O/P EST LOW 20 MIN: CPT | Performed by: INTERNAL MEDICINE

## 2022-11-10 PROCEDURE — 36415 COLL VENOUS BLD VENIPUNCTURE: CPT | Performed by: INTERNAL MEDICINE

## 2022-11-10 PROCEDURE — 80076 HEPATIC FUNCTION PANEL: CPT | Performed by: INTERNAL MEDICINE

## 2022-11-10 NOTE — PROGRESS NOTES
"Heart Care Clinical Progress Note      Mr. Inocente Rios is referred by Dr. Alvarenga ref. provider found to the [facility] team to evaluate distant history of ischemic heart disease past history of an acute myocardial infarction..      Assessment/Recommendations   Assessment:    History of ventricular fibrillation arrest associated with anterior MI in 2015.  ICD device placement.  Coronary artery disease LAD stent placement.  History of anterior wall myocardial infarction 2015  COPD  Hypercholesterolemia    Plan:         History of Present Illness/Subjective    Mr. Inocente Rios is a 67 year old male with a history of acute myocardial infarction when he presented he recalls he had had some chest pain at the time.  He was associated unfortunate with a cardiac arrest.  He was resuscitated underwent emergent intervention.  Since that time his had a stable course.  He is not recall any recurrent symptoms at this time.  Tolerating his medications well.  No CP SOB  Works as  and keeps active, much walking stair climb arrying materials.  Tolerates all well without new limits.  Recalls angina with MI but nothing since.  Meds OK heartburn statins.    Recent pacemaker interrogation in October.  No VT or VF was detected ICD function is normal.  Stress nuclear study 2019 was negative for ischemia or infarction EF was 62% LV function normal no wall motion abnormality seen.  LDL cholesterol in 2021 was 52         Physical Examination Review of Systems   /78 (BP Location: Right arm, Patient Position: Sitting, Cuff Size: Adult Regular)   Pulse 64   Resp 12   Ht 1.727 m (5' 8\")   Wt 73.7 kg (162 lb 6.4 oz)   BMI 24.69 kg/m    Body mass index is 24.69 kg/m .  Wt Readings from Last 5 Encounters:   11/10/22 73.7 kg (162 lb 6.4 oz)   03/30/22 72.6 kg (160 lb)   05/11/21 72.5 kg (159 lb 12.8 oz)   02/24/21 73.5 kg (162 lb)   02/10/21 73 kg (161 lb)     BP Readings from Last 5 Encounters:   11/10/22 " "116/78   03/30/22 122/78   05/11/21 114/72   02/24/21 116/76   02/10/21 130/80     Pulse Readings from Last 5 Encounters:   11/10/22 64   03/30/22 64   05/11/21 60   02/24/21 69   02/10/21 92       General:   Alert, appears stated age and cooperative             Neck: No carotid bruits or thyromegaly   Chest/Lungs:   Lungs are clear to auscultation, no rales or wheezing,    Cardiovascular:   S1-S2 distinct and normal without extra sounds.  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     Asthma      Cardiac arrest (H) 7/16/15     Cardiomyopathy (H)      Cardiomyopathy, ischemic      Congestive heart failure, unspecified      Coronary artery disease      Coronary artery disease      Depressive disorder      High cholesterol      Hyperlipidemia      Keratosis      MI (myocardial infarction) (H) 7/10/15    anterior     BECKI (obstructive sleep apnea)     no CPAP     Prostate cancer (H) 3/30/2022     Uncomplicated asthma     Past Surgical History:   Procedure Laterality Date     CARDIAC CATHETERIZATION       CORONARY STENT PLACEMENT       EP ICD INSERT       HERNIA REPAIR       INSERT / REPLACE / REMOVE PACEMAKER       LAPAROSCOPIC HERNIORRHAPHY INGUINAL Bilateral 11/21/2016    Procedure: LAPAROSCOPIC BILATERAL INGUINAL HERNIA REPAIR;  Surgeon: Yung Anderson MD;  Location: [facility];  Service:      OTHER SURGICAL HISTORY  7/10/15    coronary stentsmid lad     OTHER SURGICAL HISTORY  7/22/15    icd dual, [facility]     PICC  7/18/2015         Family History   Problem Relation Age of Onset     Prostate Cancer Father      Diabetes No family hx of      Coronary Artery Disease No family hx of      Parkinsonism Mother      Cancer Father      " No Known Problems Sister      No Known Problems Sister     Social History     Socioeconomic History     Marital status:      Spouse name: Not on file     Number of children: Not on file     Years of education: Not on file     Highest education level: Not on file   Occupational History     Not on file   Tobacco Use     Smoking status: Never     Smokeless tobacco: Never   Vaping Use     Vaping Use: Never used   Substance and Sexual Activity     Alcohol use: Yes     Alcohol/week: 7.0 standard drinks     Drug use: No     Sexual activity: Yes     Partners: Female   Other Topics Concern     Not on file   Social History Narrative     Not on file     Social Determinants of Health     Financial Resource Strain: Not on file   Food Insecurity: Not on file   Transportation Needs: Not on file   Physical Activity: Not on file   Stress: Not on file   Social Connections: Not on file   Intimate Partner Violence: Not on file   Housing Stability: Not on file          Medications  Allergies   Current Outpatient Medications   Medication Sig Dispense Refill     albuterol (PROAIR HFA/PROVENTIL HFA/VENTOLIN HFA) 108 (90 Base) MCG/ACT inhaler Inhale 2 puffs into the lungs every 4 hours as needed for shortness of breath / dyspnea or wheezing 18 g 1     ASPIRIN PO Take 81 mg by mouth       atorvastatin (LIPITOR) 40 MG tablet TAKE 1 TABLET (40 MG TOTAL) BY MOUTH DAILY. 90 tablet 1     metoprolol succinate ER (TOPROL XL) 25 MG 24 hr tablet TAKE 1 TABLET (25 MG) BY MOUTH DAILY 90 tablet 0     order for DME Equipment being ordered: Home blood pressure monitor 1 Units 0     sildenafil (VIAGRA) 100 MG tablet Take one half to one tablet as needed. 20 tablet 11     tadalafil (CIALIS) 20 MG tablet Take 10 mg by mouth twice a week      No Known Allergies      Lab Results    Chemistry/lipid CBC Cardiac Enzymes/BNP/TSH/INR   Lab Results   Component Value Date    CHOL 139 09/10/2021    HDL 65 09/10/2021    TRIG 109 09/10/2021    BUN 10 09/10/2021      09/10/2021    CO2 27 09/10/2021    Lab Results   Component Value Date    HGB 13.7 09/06/2019    HCT 44.7 09/06/2019    MCV 96.4 09/06/2019    No results found for: CKTOTAL, CKMB, TROPONINI, BNP, TSH, INR      Clinical evaluation time today including exam 15 minutes.      At least 50% of clinic evaluation time involved in assessment and patient counseling.      Part of this chart was created using a dictation software.  Typographic errors, word substitutions, and grammatical errors may unintentionally occur.        Ozzy Wells M.D.  [facility]

## 2022-11-10 NOTE — LETTER
"11/10/2022    Jessy Fletcher MD  [address] [place] [address]    RE: Inocente Rios       Dear Colleague,     I had the pleasure of seeing Inocente Rios in the [facility].  Heart Care Clinical Progress Note      Mr. Inocente Rios is referred by Dr. Colette hudson. provider found to the [facility] Heart Care team to evaluate distant history of ischemic heart disease past history of an acute myocardial infarction..      Assessment/Recommendations   Assessment:    History of ventricular fibrillation arrest associated with anterior MI in 2015.  ICD device placement.  Coronary artery disease LAD stent placement.  History of anterior wall myocardial infarction 2015  COPD  Hypercholesterolemia    Plan:         History of Present Illness/Subjective    Mr. Inocente Rios is a 67 year old male with a history of acute myocardial infarction when he presented he recalls he had had some chest pain at the time.  He was associated unfortunate with a cardiac arrest.  He was resuscitated underwent emergent intervention.  Since that time his had a stable course.  He is not recall any recurrent symptoms at this time.  Tolerating his medications well.  No CP SOB  Works as  and keeps active, much walking stair climb arrying materials.  Tolerates all well without new limits.  Recalls angina with MI but nothing since.  Meds OK heartburn statins.    Recent pacemaker interrogation in October.  No VT or VF was detected ICD function is normal.  Stress nuclear study 2019 was negative for ischemia or infarction EF was 62% LV function normal no wall motion abnormality seen.  LDL cholesterol in 2021 was 52         Physical Examination Review of Systems   /78 (BP Location: Right arm, Patient Position: Sitting, Cuff Size: Adult Regular)   Pulse 64   Resp 12   Ht 1.727 m (5' 8\")   Wt 73.7 kg (162 lb 6.4 oz)   BMI 24.69 kg/m    Body mass index is 24.69 kg/m .  Wt Readings from Last 5 Encounters: " "  11/10/22 73.7 kg (162 lb 6.4 oz)   03/30/22 72.6 kg (160 lb)   05/11/21 72.5 kg (159 lb 12.8 oz)   02/24/21 73.5 kg (162 lb)   02/10/21 73 kg (161 lb)     BP Readings from Last 5 Encounters:   11/10/22 116/78   03/30/22 122/78   05/11/21 114/72   02/24/21 116/76   02/10/21 130/80     Pulse Readings from Last 5 Encounters:   11/10/22 64   03/30/22 64   05/11/21 60   02/24/21 69   02/10/21 92       General:   Alert, appears stated age and cooperative             Neck: No carotid bruits or thyromegaly   Chest/Lungs:   Lungs are clear to auscultation, no rales or wheezing,    Cardiovascular:   S1-S2 distinct and normal without extra sounds.  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     Asthma      Cardiac arrest (H) 7/16/15     Cardiomyopathy (H)      Cardiomyopathy, ischemic      Congestive heart failure, unspecified      Coronary artery disease      Coronary artery disease      Depressive disorder      High cholesterol      Hyperlipidemia      Keratosis      MI (myocardial infarction) (H) 7/10/15    anterior     BECKI (obstructive sleep apnea)     no CPAP     Prostate cancer (H) 3/30/2022     Uncomplicated asthma     Past Surgical History:   Procedure Laterality Date     CARDIAC CATHETERIZATION       CORONARY STENT PLACEMENT       EP ICD INSERT       HERNIA REPAIR       INSERT / REPLACE / REMOVE PACEMAKER       LAPAROSCOPIC HERNIORRHAPHY INGUINAL Bilateral 11/21/2016    Procedure: LAPAROSCOPIC BILATERAL INGUINAL HERNIA REPAIR;  Surgeon: Yung Anderson MD;  Location: [facility];  Service:      OTHER SURGICAL HISTORY  7/10/15    coronary stentsmid lad     OTHER SURGICAL HISTORY  7/22/15    icd dual, [facility]     PICC  7/18/2015    "      Family History   Problem Relation Age of Onset     Prostate Cancer Father      Diabetes No family hx of      Coronary Artery Disease No family hx of      Parkinsonism Mother      Cancer Father      No Known Problems Sister      No Known Problems Sister     Social History     Socioeconomic History     Marital status:      Spouse name: Not on file     Number of children: Not on file     Years of education: Not on file     Highest education level: Not on file   Occupational History     Not on file   Tobacco Use     Smoking status: Never     Smokeless tobacco: Never   Vaping Use     Vaping Use: Never used   Substance and Sexual Activity     Alcohol use: Yes     Alcohol/week: 7.0 standard drinks     Drug use: No     Sexual activity: Yes     Partners: Female   Other Topics Concern     Not on file   Social History Narrative     Not on file     Social Determinants of Health     Financial Resource Strain: Not on file   Food Insecurity: Not on file   Transportation Needs: Not on file   Physical Activity: Not on file   Stress: Not on file   Social Connections: Not on file   Intimate Partner Violence: Not on file   Housing Stability: Not on file          Medications  Allergies   Current Outpatient Medications   Medication Sig Dispense Refill     albuterol (PROAIR HFA/PROVENTIL HFA/VENTOLIN HFA) 108 (90 Base) MCG/ACT inhaler Inhale 2 puffs into the lungs every 4 hours as needed for shortness of breath / dyspnea or wheezing 18 g 1     ASPIRIN PO Take 81 mg by mouth       atorvastatin (LIPITOR) 40 MG tablet TAKE 1 TABLET (40 MG TOTAL) BY MOUTH DAILY. 90 tablet 1     metoprolol succinate ER (TOPROL XL) 25 MG 24 hr tablet TAKE 1 TABLET (25 MG) BY MOUTH DAILY 90 tablet 0     order for DME Equipment being ordered: Home blood pressure monitor 1 Units 0     sildenafil (VIAGRA) 100 MG tablet Take one half to one tablet as needed. 20 tablet 11     tadalafil (CIALIS) 20 MG tablet Take 10 mg by mouth twice a week      No Known  Allergies      Lab Results    Chemistry/lipid CBC Cardiac Enzymes/BNP/TSH/INR   Lab Results   Component Value Date    CHOL 139 09/10/2021    HDL 65 09/10/2021    TRIG 109 09/10/2021    BUN 10 09/10/2021     09/10/2021    CO2 27 09/10/2021    Lab Results   Component Value Date    HGB 13.7 09/06/2019    HCT 44.7 09/06/2019    MCV 96.4 09/06/2019    No results found for: CKTOTAL, CKMB, TROPONINI, BNP, TSH, INR      Clinical evaluation time today including exam 15 minutes.      At least 50% of clinic evaluation time involved in assessment and patient counseling.      Part of this chart was created using a dictation software.  Typographic errors, word substitutions, and grammatical errors may unintentionally occur.        Ozzy Wells M.D.  [facility]      Thank you for allowing me to participate in the care of your patient.      Sincerely,     Ozzy Wells MD     [facility]  cc:   No referring provider defined for this encounter.

## 2022-11-15 ENCOUNTER — TELEPHONE (OUTPATIENT)
Dept: CARDIOLOGY | Facility: CLINIC | Age: 67
End: 2022-11-15

## 2022-11-15 NOTE — TELEPHONE ENCOUNTER
[facility]    Phone Message    May a detailed message be left on voicemail: yes     Reason for Call: Requesting Results   Name/type of test: Lipid, HFP  Date of test: 11/10/22  Was test done at a location other than [facility] (Please fill in the location if not [facility])?: Yes: [facility]    Please review results with patient.Patient received them in my chart but would like to go over them.      Action Taken: Other: Cardiology    Travel Screening: Not Applicable

## 2022-11-17 NOTE — TELEPHONE ENCOUNTER
Pt saw reply from provider in [facility], but requesting return call. LUIS MIGUEL,Rn

## 2022-11-17 NOTE — TELEPHONE ENCOUNTER
"PC to patient and review of question. Pt reports that he would like to understand why the Protein level on his hepatic function tests was \"flagged\" outside the normal range. Pt was concerned about this finding.     Reassurance to patient.Pt had similar lab results a year ago. More importantly other lab findings within the hepatic labs are WNL. Will ask provider ,and return response in my chart.    Dr. Wells, please discuss the protein finding in the hepatic function test? Please advise if there are ways to improve this value? Is there any concerns with this lab finding and being on a statin? Thank you CMM,Rn   "

## 2022-11-18 NOTE — TELEPHONE ENCOUNTER
No concerns.  Same value as last year, and no history of abnormal Cr.  I called Bill to discuss it with him.  He's reassured, and no further action needed.    Jessy Fletcher MD

## 2022-11-18 NOTE — TELEPHONE ENCOUNTER
Dr. Fletcher please review hepatic function tests. Any concerns or discussion for Low Protein level? Please advise. CMM,Rn

## 2022-11-18 NOTE — TELEPHONE ENCOUNTER
===View-only below this line===  ----- Message -----  From: Ozzy Wells MD  Sent: 11/18/2022   9:55 AM CST  To: Murphy Murphy RN    Im not sure whatthis means  would defer to primary care

## 2023-01-24 ENCOUNTER — ANCILLARY PROCEDURE (OUTPATIENT)
Dept: CARDIOLOGY | Facility: CLINIC | Age: 68
End: 2023-01-24
Attending: INTERNAL MEDICINE
Payer: MEDICARE

## 2023-01-24 DIAGNOSIS — I49.01 VF (VENTRICULAR FIBRILLATION) (H): ICD-10-CM

## 2023-01-24 DIAGNOSIS — Z95.810 ICD (IMPLANTABLE CARDIOVERTER-DEFIBRILLATOR) IN PLACE: ICD-10-CM

## 2023-01-25 LAB
MDC_IDC_EPISODE_DTM: NORMAL
MDC_IDC_EPISODE_ID: NORMAL
MDC_IDC_EPISODE_TYPE: NORMAL
MDC_IDC_LEAD_IMPLANT_DT: NORMAL
MDC_IDC_LEAD_LOCATION: NORMAL
MDC_IDC_LEAD_LOCATION_DETAIL_1: NORMAL
MDC_IDC_LEAD_MFG: NORMAL
MDC_IDC_LEAD_MODEL: NORMAL
MDC_IDC_LEAD_POLARITY_TYPE: NORMAL
MDC_IDC_LEAD_SERIAL: NORMAL
MDC_IDC_MSMT_BATTERY_DTM: NORMAL
MDC_IDC_MSMT_BATTERY_REMAINING_LONGEVITY: 120 MO
MDC_IDC_MSMT_BATTERY_REMAINING_PERCENTAGE: 100 %
MDC_IDC_MSMT_BATTERY_STATUS: NORMAL
MDC_IDC_MSMT_CAP_CHARGE_DTM: NORMAL
MDC_IDC_MSMT_CAP_CHARGE_DTM: NORMAL
MDC_IDC_MSMT_CAP_CHARGE_ENERGY: 21 J
MDC_IDC_MSMT_CAP_CHARGE_TIME: 10.1 S
MDC_IDC_MSMT_CAP_CHARGE_TIME: 3.7 S
MDC_IDC_MSMT_CAP_CHARGE_TYPE: NORMAL
MDC_IDC_MSMT_CAP_CHARGE_TYPE: NORMAL
MDC_IDC_MSMT_LEADCHNL_RV_IMPEDANCE_VALUE: 769 OHM
MDC_IDC_MSMT_LEADCHNL_RV_PACING_THRESHOLD_AMPLITUDE: 1.1 V
MDC_IDC_MSMT_LEADCHNL_RV_PACING_THRESHOLD_PULSEWIDTH: 0.4 MS
MDC_IDC_PG_IMPLANT_DTM: NORMAL
MDC_IDC_PG_MFG: NORMAL
MDC_IDC_PG_MODEL: NORMAL
MDC_IDC_PG_SERIAL: NORMAL
MDC_IDC_PG_TYPE: NORMAL
MDC_IDC_SESS_CLINIC_NAME: NORMAL
MDC_IDC_SESS_DTM: NORMAL
MDC_IDC_SESS_TYPE: NORMAL
MDC_IDC_SET_BRADY_LOWRATE: 40 {BEATS}/MIN
MDC_IDC_SET_BRADY_MODE: NORMAL
MDC_IDC_SET_LEADCHNL_RV_PACING_AMPLITUDE: 2.3 V
MDC_IDC_SET_LEADCHNL_RV_PACING_POLARITY: NORMAL
MDC_IDC_SET_LEADCHNL_RV_PACING_PULSEWIDTH: 0.4 MS
MDC_IDC_SET_LEADCHNL_RV_SENSING_ADAPTATION_MODE: NORMAL
MDC_IDC_SET_LEADCHNL_RV_SENSING_POLARITY: NORMAL
MDC_IDC_SET_LEADCHNL_RV_SENSING_SENSITIVITY: 0.6 MV
MDC_IDC_SET_ZONE_DETECTION_INTERVAL: 250 MS
MDC_IDC_SET_ZONE_DETECTION_INTERVAL: 324 MS
MDC_IDC_SET_ZONE_TYPE: NORMAL
MDC_IDC_SET_ZONE_TYPE: NORMAL
MDC_IDC_SET_ZONE_VENDOR_TYPE: NORMAL
MDC_IDC_SET_ZONE_VENDOR_TYPE: NORMAL
MDC_IDC_STAT_BRADY_DTM_END: NORMAL
MDC_IDC_STAT_BRADY_DTM_START: NORMAL
MDC_IDC_STAT_BRADY_RV_PERCENT_PACED: 0 %
MDC_IDC_STAT_EPISODE_RECENT_COUNT: 0
MDC_IDC_STAT_EPISODE_RECENT_COUNT_DTM_END: NORMAL
MDC_IDC_STAT_EPISODE_RECENT_COUNT_DTM_START: NORMAL
MDC_IDC_STAT_EPISODE_TYPE: NORMAL
MDC_IDC_STAT_EPISODE_VENDOR_TYPE: NORMAL
MDC_IDC_STAT_TACHYTHERAPY_ATP_DELIVERED_RECENT: 0
MDC_IDC_STAT_TACHYTHERAPY_ATP_DELIVERED_TOTAL: 0
MDC_IDC_STAT_TACHYTHERAPY_RECENT_DTM_END: NORMAL
MDC_IDC_STAT_TACHYTHERAPY_RECENT_DTM_START: NORMAL
MDC_IDC_STAT_TACHYTHERAPY_SHOCKS_ABORTED_RECENT: 0
MDC_IDC_STAT_TACHYTHERAPY_SHOCKS_ABORTED_TOTAL: 0
MDC_IDC_STAT_TACHYTHERAPY_SHOCKS_DELIVERED_RECENT: 0
MDC_IDC_STAT_TACHYTHERAPY_SHOCKS_DELIVERED_TOTAL: 1
MDC_IDC_STAT_TACHYTHERAPY_TOTAL_DTM_END: NORMAL
MDC_IDC_STAT_TACHYTHERAPY_TOTAL_DTM_START: NORMAL

## 2023-01-25 PROCEDURE — 93296 REM INTERROG EVL PM/IDS: CPT | Performed by: INTERNAL MEDICINE

## 2023-01-25 PROCEDURE — 93295 DEV INTERROG REMOTE 1/2/MLT: CPT | Performed by: INTERNAL MEDICINE

## 2023-01-28 ENCOUNTER — HOSPITAL ENCOUNTER (OUTPATIENT)
Facility: CLINIC | Age: 68
Setting detail: OBSERVATION
Discharge: HOME | End: 2023-01-29
Attending: EMERGENCY MEDICINE | Admitting: SURGERY
Payer: MEDICARE

## 2023-01-28 ENCOUNTER — APPOINTMENT (OUTPATIENT)
Dept: CT IMAGING | Facility: CLINIC | Age: 68
End: 2023-01-28
Attending: EMERGENCY MEDICINE
Payer: MEDICARE

## 2023-01-28 DIAGNOSIS — K35.30 ACUTE APPENDICITIS WITH LOCALIZED PERITONITIS, WITHOUT PERFORATION, ABSCESS, OR GANGRENE: ICD-10-CM

## 2023-01-28 DIAGNOSIS — Z11.52 ENCOUNTER FOR SCREENING LABORATORY TESTING FOR SEVERE ACUTE RESPIRATORY SYNDROME CORONAVIRUS 2 (SARS-COV-2): ICD-10-CM

## 2023-01-28 LAB
ALBUMIN SERPL BCG-MCNC: 3.8 G/DL (ref 3.5–5.2)
ALBUMIN UR-MCNC: NEGATIVE MG/DL
ALP SERPL-CCNC: 66 U/L (ref 40–129)
ALT SERPL W P-5'-P-CCNC: 24 U/L (ref 10–50)
ANION GAP SERPL CALCULATED.3IONS-SCNC: 8 MMOL/L (ref 7–15)
APPEARANCE UR: CLEAR
AST SERPL W P-5'-P-CCNC: 29 U/L (ref 10–50)
ATRIAL RATE - MUSE: 98 BPM
BASOPHILS # BLD AUTO: 0 10E3/UL (ref 0–0.2)
BASOPHILS NFR BLD AUTO: 0 %
BILIRUB SERPL-MCNC: 0.5 MG/DL
BILIRUB UR QL STRIP: NEGATIVE
BUN SERPL-MCNC: 13.7 MG/DL (ref 8–23)
CALCIUM SERPL-MCNC: 8.3 MG/DL (ref 8.8–10.2)
CHLORIDE SERPL-SCNC: 104 MMOL/L (ref 98–107)
COLOR UR AUTO: ABNORMAL
CREAT SERPL-MCNC: 0.9 MG/DL (ref 0.67–1.17)
DEPRECATED HCO3 PLAS-SCNC: 24 MMOL/L (ref 22–29)
DIASTOLIC BLOOD PRESSURE - MUSE: NORMAL MMHG
EOSINOPHIL # BLD AUTO: 0 10E3/UL (ref 0–0.7)
EOSINOPHIL NFR BLD AUTO: 0 %
ERYTHROCYTE [DISTWIDTH] IN BLOOD BY AUTOMATED COUNT: 12.7 % (ref 10–15)
GFR SERPL CREATININE-BSD FRML MDRD: >90 ML/MIN/1.73M2
GLUCOSE SERPL-MCNC: 133 MG/DL (ref 70–99)
GLUCOSE UR STRIP-MCNC: NEGATIVE MG/DL
HCT VFR BLD AUTO: 42.7 % (ref 40–53)
HGB BLD-MCNC: 14 G/DL (ref 13.3–17.7)
HGB UR QL STRIP: NEGATIVE
HOLD SPECIMEN: NORMAL
HOLD SPECIMEN: NORMAL
IMM GRANULOCYTES # BLD: 0 10E3/UL
IMM GRANULOCYTES NFR BLD: 0 %
INTERPRETATION ECG - MUSE: NORMAL
KETONES UR STRIP-MCNC: 10 MG/DL
LEUKOCYTE ESTERASE UR QL STRIP: NEGATIVE
LIPASE SERPL-CCNC: 28 U/L (ref 13–60)
LYMPHOCYTES # BLD AUTO: 0.5 10E3/UL (ref 0.8–5.3)
LYMPHOCYTES NFR BLD AUTO: 5 %
MCH RBC QN AUTO: 30.8 PG (ref 26.5–33)
MCHC RBC AUTO-ENTMCNC: 32.8 G/DL (ref 31.5–36.5)
MCV RBC AUTO: 94 FL (ref 78–100)
MONOCYTES # BLD AUTO: 0.6 10E3/UL (ref 0–1.3)
MONOCYTES NFR BLD AUTO: 5 %
MUCOUS THREADS #/AREA URNS LPF: PRESENT /LPF
NEUTROPHILS # BLD AUTO: 9.6 10E3/UL (ref 1.6–8.3)
NEUTROPHILS NFR BLD AUTO: 90 %
NITRATE UR QL: NEGATIVE
NRBC # BLD AUTO: 0 10E3/UL
NRBC BLD AUTO-RTO: 0 /100
P AXIS - MUSE: 56 DEGREES
PH UR STRIP: 6.5 [PH] (ref 5–7)
PLATELET # BLD AUTO: 204 10E3/UL (ref 150–450)
POTASSIUM SERPL-SCNC: 3.9 MMOL/L (ref 3.4–5.3)
PR INTERVAL - MUSE: 156 MS
PROT SERPL-MCNC: 6.2 G/DL (ref 6.4–8.3)
QRS DURATION - MUSE: 82 MS
QT - MUSE: 360 MS
QTC - MUSE: 459 MS
R AXIS - MUSE: 74 DEGREES
RBC # BLD AUTO: 4.55 10E6/UL (ref 4.4–5.9)
RBC URINE: 1 /HPF
SARS-COV-2 RNA RESP QL NAA+PROBE: NEGATIVE
SODIUM SERPL-SCNC: 136 MMOL/L (ref 136–145)
SP GR UR STRIP: 1.03 (ref 1–1.03)
SYSTOLIC BLOOD PRESSURE - MUSE: NORMAL MMHG
T AXIS - MUSE: 21 DEGREES
UROBILINOGEN UR STRIP-MCNC: NORMAL MG/DL
VENTRICULAR RATE- MUSE: 98 BPM
WBC # BLD AUTO: 10.8 10E3/UL (ref 4–11)
WBC URINE: 4 /HPF

## 2023-01-28 PROCEDURE — 258N000003 HC RX IP 258 OP 636: Performed by: EMERGENCY MEDICINE

## 2023-01-28 PROCEDURE — U0003 INFECTIOUS AGENT DETECTION BY NUCLEIC ACID (DNA OR RNA); SEVERE ACUTE RESPIRATORY SYNDROME CORONAVIRUS 2 (SARS-COV-2) (CORONAVIRUS DISEASE [COVID-19]), AMPLIFIED PROBE TECHNIQUE, MAKING USE OF HIGH THROUGHPUT TECHNOLOGIES AS DESCRIBED BY CMS-2020-01-R: HCPCS

## 2023-01-28 PROCEDURE — C9803 HOPD COVID-19 SPEC COLLECT: HCPCS | Performed by: EMERGENCY MEDICINE

## 2023-01-28 PROCEDURE — 99285 EMERGENCY DEPT VISIT HI MDM: CPT | Mod: 25 | Performed by: EMERGENCY MEDICINE

## 2023-01-28 PROCEDURE — G0378 HOSPITAL OBSERVATION PER HR: HCPCS

## 2023-01-28 PROCEDURE — 250N000011 HC RX IP 250 OP 636: Performed by: EMERGENCY MEDICINE

## 2023-01-28 PROCEDURE — 74177 CT ABD & PELVIS W/CONTRAST: CPT | Mod: MG

## 2023-01-28 PROCEDURE — 81001 URINALYSIS AUTO W/SCOPE: CPT | Performed by: EMERGENCY MEDICINE

## 2023-01-28 PROCEDURE — 85025 COMPLETE CBC W/AUTO DIFF WBC: CPT | Performed by: EMERGENCY MEDICINE

## 2023-01-28 PROCEDURE — 74177 CT ABD & PELVIS W/CONTRAST: CPT | Mod: 26 | Performed by: RADIOLOGY

## 2023-01-28 PROCEDURE — 83690 ASSAY OF LIPASE: CPT | Performed by: EMERGENCY MEDICINE

## 2023-01-28 PROCEDURE — 96375 TX/PRO/DX INJ NEW DRUG ADDON: CPT | Performed by: EMERGENCY MEDICINE

## 2023-01-28 PROCEDURE — 93005 ELECTROCARDIOGRAM TRACING: CPT | Performed by: EMERGENCY MEDICINE

## 2023-01-28 PROCEDURE — 99285 EMERGENCY DEPT VISIT HI MDM: CPT | Performed by: EMERGENCY MEDICINE

## 2023-01-28 PROCEDURE — 96365 THER/PROPH/DIAG IV INF INIT: CPT | Mod: 59 | Performed by: EMERGENCY MEDICINE

## 2023-01-28 PROCEDURE — 96361 HYDRATE IV INFUSION ADD-ON: CPT | Performed by: EMERGENCY MEDICINE

## 2023-01-28 PROCEDURE — G1010 CDSM STANSON: HCPCS | Mod: GC | Performed by: RADIOLOGY

## 2023-01-28 PROCEDURE — 80053 COMPREHEN METABOLIC PANEL: CPT | Performed by: EMERGENCY MEDICINE

## 2023-01-28 PROCEDURE — 36415 COLL VENOUS BLD VENIPUNCTURE: CPT | Performed by: EMERGENCY MEDICINE

## 2023-01-28 RX ORDER — OXYCODONE HYDROCHLORIDE 5 MG/1
5 TABLET ORAL ONCE
Status: DISCONTINUED | OUTPATIENT
Start: 2023-01-28 | End: 2023-01-29

## 2023-01-28 RX ORDER — IOPAMIDOL 755 MG/ML
75 INJECTION, SOLUTION INTRAVASCULAR ONCE
Status: COMPLETED | OUTPATIENT
Start: 2023-01-28 | End: 2023-01-28

## 2023-01-28 RX ORDER — PIPERACILLIN SODIUM, TAZOBACTAM SODIUM 3; .375 G/15ML; G/15ML
3.38 INJECTION, POWDER, LYOPHILIZED, FOR SOLUTION INTRAVENOUS EVERY 6 HOURS
Status: DISCONTINUED | OUTPATIENT
Start: 2023-01-28 | End: 2023-01-29

## 2023-01-28 RX ORDER — ONDANSETRON 2 MG/ML
4 INJECTION INTRAMUSCULAR; INTRAVENOUS ONCE
Status: COMPLETED | OUTPATIENT
Start: 2023-01-28 | End: 2023-01-28

## 2023-01-28 RX ADMIN — SODIUM CHLORIDE 500 ML: 9 INJECTION, SOLUTION INTRAVENOUS at 18:12

## 2023-01-28 RX ADMIN — PIPERACILLIN AND TAZOBACTAM 3.38 G: 3; .375 INJECTION, POWDER, LYOPHILIZED, FOR SOLUTION INTRAVENOUS at 20:53

## 2023-01-28 RX ADMIN — IOPAMIDOL 75 ML: 755 INJECTION, SOLUTION INTRAVENOUS at 19:08

## 2023-01-28 RX ADMIN — ONDANSETRON 4 MG: 2 INJECTION INTRAMUSCULAR; INTRAVENOUS at 18:12

## 2023-01-28 ASSESSMENT — ACTIVITIES OF DAILY LIVING (ADL)
ADLS_ACUITY_SCORE: 35

## 2023-01-28 NOTE — ED PROVIDER NOTES
"      [place] EMERGENCY DEPARTMENT ([facility])  January 28, 2023  ED Provider Note  [facility]      History     Chief Complaint   Patient presents with     Nausea & Vomiting     HPI  Inocente Rios is a 67 year old male with a past medical history significant for prostate cancer, anoxic dharmesh injury, CAD s/p ICD, cardiac arrest, hypertension, hyperlipidemia, who presents to the Emergency Department for evaluation of nausea, vomiting, and abdominal pain. Patient reports right abdominal pain which started this afternoon. Denies any radiation to the back. He also reports vomiting and states he is unable to keep water down. He states he was feeling ill this morning but  symptoms got worse around noon. He states he has no appetite. He states he has never had past similar symptoms. He reports normal bowel movement. He report history of prostate cancer and had prostate surgery May 2020. He states he was on radiation from April to June. Not currently on any chemo.  He states he drinks about two beer a night. He states he does not take any medication other than baby aspirin. Denies recent contact with someone with illness.  Denies bloody stool or diarrhea. Denies any urinary symptoms. Denies fever, cough, or rhinorrhea.      Physical Exam   BP: 134/88  Pulse: 77  Temp: 97.7  F (36.5  C)  Resp: 20  Height: 177.8 cm (5' 10\")  Weight: 73.9 kg (163 lb)  SpO2: 98 %  Physical Exam  General: patient is alert and oriented, uncomfortable appearing   Head: atraumatic and normocephalic   EENT: moist mucus membranes without tonsillar erythema or exudates, sclera anicteric   Neck: supple   Cardiovascular: regular rate and rhythm, extremities warm and well perfused, no lower extremity edema  Pulmonary: lungs clear to auscultation bilaterally   Abdomen: soft, tenderness to palpation in the suprapubic area and right lower quadrant, voluntary guarding, no CVA tenderness to palpation  Musculoskeletal: " normal range of motion   Neurological: alert and oriented, moving all extremities symmetrically, gait normal   Skin: warm, dry     ED Course, Procedures, & Data      Procedures        5:38 PM  The patient was seen and examined by Didi Bhandari MD in Triage.               No results found for any visits on 01/28/23.  Medications - No data to display  Labs Ordered and Resulted from Time of ED Arrival to Time of ED Departure - No data to display  No orders to display          Medical Decision Making  The patient's presentation is strongly suggestive of an acute illness with systemic symptoms.    The patient's evaluation involved:  review of external note(s) from 2 sources (PCP notes, onc notes)  ordering and/or review of 3+ test(s) in this encounter (see separate area of note for details)  discussion of management or test interpretation with another health professional (see separate area of note for details)    The patient's management involved prescription drug management (including medications given in the ED), a decision regarding emergency major surgery and a decision regarding hospitalization.      Assessment & Plan    Mr. Rios is a 67 year old male with a history of coronary artery disease status post ICD placement and prior cardiac arrest as well as prostate cancer status post prostatectomy and radiotherapy who presents to the emergency department with suprapubic and right lower quadrant pain and associated with nausea and vomiting.  He is hemodynamically stable and afebrile and in no respiratory distress.  Differential diagnosis includes but is not limited to appendicitis, ureterolithiasis, UTI, colitis, bowel obstruction, gastroenteritis.  Laboratory evaluation shows no leukocytosis, no electrolyte abnormalities or elevation in LFTs.  Abdominal CT shows appendicitis without perforation or abscess.  UA pending.  He was given IV fluids, oxycodone, Zofran and zosyn.  Surgery consulted and will plan to  admit to surgery for further management.      I have reviewed the nursing notes. I have reviewed the findings, diagnosis, plan and need for follow up with the patient.    New Prescriptions    No medications on file       Final diagnoses:   Acute appendicitis with localized peritonitis, without perforation, abscess, or gangrene   I, Nata Villalba, am serving as a trained medical scribe to document services personally performed by Didi Bhandari MD, based on the provider's statements to me.      I, Didi Bhandari MD, was physically present and have reviewed and verified the accuracy of this note documented by Nata Villalba.     Didi Bhandari MD  [facility] EMERGENCY DEPARTMENT  1/28/2023     Didi Bhandari MD  01/28/23 2101

## 2023-01-28 NOTE — ED TRIAGE NOTES
Pt presents with a 2 day history of abdominal pain, nausea, and vomiting.       Triage Assessment     Row Name 01/28/23 5466       Triage Assessment (Adult)    Airway WDL WDL       Respiratory WDL    Respiratory WDL WDL       Skin Circulation/Temperature WDL    Skin Circulation/Temperature WDL WDL       Cardiac WDL    Cardiac WDL WDL       Peripheral/Neurovascular WDL    Peripheral Neurovascular WDL WDL       Cognitive/Neuro/Behavioral WDL    Cognitive/Neuro/Behavioral WDL WDL

## 2023-01-29 ENCOUNTER — ANESTHESIA EVENT (OUTPATIENT)
Dept: SURGERY | Facility: CLINIC | Age: 68
End: 2023-01-29
Payer: MEDICARE

## 2023-01-29 ENCOUNTER — ANESTHESIA (OUTPATIENT)
Dept: SURGERY | Facility: CLINIC | Age: 68
End: 2023-01-29
Payer: MEDICARE

## 2023-01-29 VITALS
TEMPERATURE: 97.8 F | HEART RATE: 98 BPM | SYSTOLIC BLOOD PRESSURE: 103 MMHG | OXYGEN SATURATION: 92 % | WEIGHT: 158.6 LBS | HEIGHT: 70 IN | DIASTOLIC BLOOD PRESSURE: 66 MMHG | RESPIRATION RATE: 16 BRPM | BODY MASS INDEX: 22.71 KG/M2

## 2023-01-29 LAB — RADIOLOGIST FLAGS: ABNORMAL

## 2023-01-29 PROCEDURE — 250N000011 HC RX IP 250 OP 636

## 2023-01-29 PROCEDURE — G0378 HOSPITAL OBSERVATION PER HR: HCPCS

## 2023-01-29 PROCEDURE — 250N000025 HC SEVOFLURANE, PER MIN: Performed by: SURGERY

## 2023-01-29 PROCEDURE — 258N000003 HC RX IP 258 OP 636: Performed by: NURSE ANESTHETIST, CERTIFIED REGISTERED

## 2023-01-29 PROCEDURE — 88304 TISSUE EXAM BY PATHOLOGIST: CPT | Mod: 26 | Performed by: PATHOLOGY

## 2023-01-29 PROCEDURE — 370N000017 HC ANESTHESIA TECHNICAL FEE, PER MIN: Performed by: SURGERY

## 2023-01-29 PROCEDURE — 96376 TX/PRO/DX INJ SAME DRUG ADON: CPT | Mod: XU

## 2023-01-29 PROCEDURE — 999N000141 HC STATISTIC PRE-PROCEDURE NURSING ASSESSMENT: Performed by: SURGERY

## 2023-01-29 PROCEDURE — 710N000010 HC RECOVERY PHASE 1, LEVEL 2, PER MIN: Performed by: SURGERY

## 2023-01-29 PROCEDURE — 272N000001 HC OR GENERAL SUPPLY STERILE: Performed by: SURGERY

## 2023-01-29 PROCEDURE — 250N000009 HC RX 250: Performed by: NURSE ANESTHETIST, CERTIFIED REGISTERED

## 2023-01-29 PROCEDURE — 44970 LAPAROSCOPY APPENDECTOMY: CPT | Mod: GC | Performed by: SURGERY

## 2023-01-29 PROCEDURE — 250N000011 HC RX IP 250 OP 636: Performed by: NURSE ANESTHETIST, CERTIFIED REGISTERED

## 2023-01-29 PROCEDURE — 99223 1ST HOSP IP/OBS HIGH 75: CPT | Mod: 57 | Performed by: SURGERY

## 2023-01-29 PROCEDURE — 88304 TISSUE EXAM BY PATHOLOGIST: CPT | Mod: TC | Performed by: SURGERY

## 2023-01-29 PROCEDURE — 250N000009 HC RX 250: Performed by: SURGERY

## 2023-01-29 PROCEDURE — 250N000013 HC RX MED GY IP 250 OP 250 PS 637: Performed by: SURGERY

## 2023-01-29 PROCEDURE — 360N000076 HC SURGERY LEVEL 3, PER MIN: Performed by: SURGERY

## 2023-01-29 RX ORDER — SODIUM CHLORIDE, SODIUM LACTATE, POTASSIUM CHLORIDE, CALCIUM CHLORIDE 600; 310; 30; 20 MG/100ML; MG/100ML; MG/100ML; MG/100ML
INJECTION, SOLUTION INTRAVENOUS CONTINUOUS PRN
Status: DISCONTINUED | OUTPATIENT
Start: 2023-01-29 | End: 2023-01-29

## 2023-01-29 RX ORDER — EPHEDRINE SULFATE 50 MG/ML
INJECTION, SOLUTION INTRAMUSCULAR; INTRAVENOUS; SUBCUTANEOUS PRN
Status: DISCONTINUED | OUTPATIENT
Start: 2023-01-29 | End: 2023-01-29

## 2023-01-29 RX ORDER — ACETAMINOPHEN 325 MG/1
650 TABLET ORAL EVERY 4 HOURS PRN
Status: DISCONTINUED | OUTPATIENT
Start: 2023-02-01 | End: 2023-01-29 | Stop reason: HOSPADM

## 2023-01-29 RX ORDER — ONDANSETRON 2 MG/ML
INJECTION INTRAMUSCULAR; INTRAVENOUS PRN
Status: DISCONTINUED | OUTPATIENT
Start: 2023-01-29 | End: 2023-01-29

## 2023-01-29 RX ORDER — CEFAZOLIN SODIUM 2 G/100ML
2 INJECTION, SOLUTION INTRAVENOUS
Status: COMPLETED | OUTPATIENT
Start: 2023-01-29 | End: 2023-01-29

## 2023-01-29 RX ORDER — BISACODYL 10 MG
10 SUPPOSITORY, RECTAL RECTAL DAILY PRN
Status: DISCONTINUED | OUTPATIENT
Start: 2023-01-29 | End: 2023-01-29 | Stop reason: HOSPADM

## 2023-01-29 RX ORDER — DEXAMETHASONE SODIUM PHOSPHATE 4 MG/ML
INJECTION, SOLUTION INTRA-ARTICULAR; INTRALESIONAL; INTRAMUSCULAR; INTRAVENOUS; SOFT TISSUE PRN
Status: DISCONTINUED | OUTPATIENT
Start: 2023-01-29 | End: 2023-01-29

## 2023-01-29 RX ORDER — NALOXONE HYDROCHLORIDE 0.4 MG/ML
0.4 INJECTION, SOLUTION INTRAMUSCULAR; INTRAVENOUS; SUBCUTANEOUS
Status: DISCONTINUED | OUTPATIENT
Start: 2023-01-29 | End: 2023-01-29 | Stop reason: HOSPADM

## 2023-01-29 RX ORDER — POLYETHYLENE GLYCOL 3350 17 G/17G
17 POWDER, FOR SOLUTION ORAL DAILY
Status: DISCONTINUED | OUTPATIENT
Start: 2023-01-30 | End: 2023-01-29 | Stop reason: HOSPADM

## 2023-01-29 RX ORDER — PROPOFOL 10 MG/ML
INJECTION, EMULSION INTRAVENOUS PRN
Status: DISCONTINUED | OUTPATIENT
Start: 2023-01-29 | End: 2023-01-29

## 2023-01-29 RX ORDER — OXYCODONE HYDROCHLORIDE 5 MG/1
5 TABLET ORAL EVERY 4 HOURS PRN
Status: DISCONTINUED | OUTPATIENT
Start: 2023-01-29 | End: 2023-01-29 | Stop reason: HOSPADM

## 2023-01-29 RX ORDER — ONDANSETRON 4 MG/1
4 TABLET, ORALLY DISINTEGRATING ORAL EVERY 6 HOURS PRN
Status: DISCONTINUED | OUTPATIENT
Start: 2023-01-29 | End: 2023-01-29

## 2023-01-29 RX ORDER — FENTANYL CITRATE 50 UG/ML
INJECTION, SOLUTION INTRAMUSCULAR; INTRAVENOUS PRN
Status: DISCONTINUED | OUTPATIENT
Start: 2023-01-29 | End: 2023-01-29

## 2023-01-29 RX ORDER — AMOXICILLIN 250 MG
1 CAPSULE ORAL 2 TIMES DAILY
Status: DISCONTINUED | OUTPATIENT
Start: 2023-01-29 | End: 2023-01-29 | Stop reason: HOSPADM

## 2023-01-29 RX ORDER — OXYCODONE HYDROCHLORIDE 10 MG/1
10 TABLET ORAL EVERY 4 HOURS PRN
Status: DISCONTINUED | OUTPATIENT
Start: 2023-01-29 | End: 2023-01-29 | Stop reason: HOSPADM

## 2023-01-29 RX ORDER — AMOXICILLIN 250 MG
2 CAPSULE ORAL DAILY
Qty: 20 TABLET | Refills: 0 | Status: SHIPPED | OUTPATIENT
Start: 2023-01-29

## 2023-01-29 RX ORDER — ONDANSETRON 2 MG/ML
4 INJECTION INTRAMUSCULAR; INTRAVENOUS EVERY 6 HOURS PRN
Status: DISCONTINUED | OUTPATIENT
Start: 2023-01-29 | End: 2023-01-29 | Stop reason: HOSPADM

## 2023-01-29 RX ORDER — OXYCODONE HYDROCHLORIDE 5 MG/1
5 TABLET ORAL EVERY 4 HOURS PRN
Status: DISCONTINUED | OUTPATIENT
Start: 2023-01-29 | End: 2023-01-29

## 2023-01-29 RX ORDER — NALOXONE HYDROCHLORIDE 0.4 MG/ML
0.2 INJECTION, SOLUTION INTRAMUSCULAR; INTRAVENOUS; SUBCUTANEOUS
Status: DISCONTINUED | OUTPATIENT
Start: 2023-01-29 | End: 2023-01-29 | Stop reason: HOSPADM

## 2023-01-29 RX ORDER — LIDOCAINE 40 MG/G
CREAM TOPICAL
Status: DISCONTINUED | OUTPATIENT
Start: 2023-01-29 | End: 2023-01-29 | Stop reason: HOSPADM

## 2023-01-29 RX ORDER — ONDANSETRON 2 MG/ML
4 INJECTION INTRAMUSCULAR; INTRAVENOUS EVERY 6 HOURS PRN
Status: DISCONTINUED | OUTPATIENT
Start: 2023-01-29 | End: 2023-01-29

## 2023-01-29 RX ORDER — BUPIVACAINE HYDROCHLORIDE 5 MG/ML
INJECTION, SOLUTION EPIDURAL; INTRACAUDAL PRN
Status: DISCONTINUED | OUTPATIENT
Start: 2023-01-29 | End: 2023-01-29 | Stop reason: HOSPADM

## 2023-01-29 RX ORDER — METHOCARBAMOL 500 MG/1
500 TABLET, FILM COATED ORAL EVERY 6 HOURS PRN
Status: DISCONTINUED | OUTPATIENT
Start: 2023-01-29 | End: 2023-01-29 | Stop reason: HOSPADM

## 2023-01-29 RX ORDER — ONDANSETRON 4 MG/1
4 TABLET, ORALLY DISINTEGRATING ORAL EVERY 6 HOURS PRN
Status: DISCONTINUED | OUTPATIENT
Start: 2023-01-29 | End: 2023-01-29 | Stop reason: HOSPADM

## 2023-01-29 RX ORDER — METOPROLOL TARTRATE 1 MG/ML
INJECTION, SOLUTION INTRAVENOUS PRN
Status: DISCONTINUED | OUTPATIENT
Start: 2023-01-29 | End: 2023-01-29

## 2023-01-29 RX ORDER — CEFAZOLIN SODIUM 2 G/100ML
2 INJECTION, SOLUTION INTRAVENOUS SEE ADMIN INSTRUCTIONS
Status: DISCONTINUED | OUTPATIENT
Start: 2023-01-29 | End: 2023-01-29

## 2023-01-29 RX ORDER — ACETAMINOPHEN 325 MG/1
975 TABLET ORAL EVERY 8 HOURS PRN
Status: DISCONTINUED | OUTPATIENT
Start: 2023-01-29 | End: 2023-01-29

## 2023-01-29 RX ORDER — ACETAMINOPHEN 325 MG/1
975 TABLET ORAL EVERY 8 HOURS
Status: DISCONTINUED | OUTPATIENT
Start: 2023-01-29 | End: 2023-01-29 | Stop reason: HOSPADM

## 2023-01-29 RX ORDER — OXYCODONE HYDROCHLORIDE 5 MG/1
5-10 TABLET ORAL EVERY 4 HOURS PRN
Qty: 12 TABLET | Refills: 0 | Status: SHIPPED | OUTPATIENT
Start: 2023-01-29

## 2023-01-29 RX ADMIN — DEXAMETHASONE SODIUM PHOSPHATE 6 MG: 4 INJECTION, SOLUTION INTRA-ARTICULAR; INTRALESIONAL; INTRAMUSCULAR; INTRAVENOUS; SOFT TISSUE at 08:30

## 2023-01-29 RX ADMIN — PROPOFOL 150 MG: 10 INJECTION, EMULSION INTRAVENOUS at 08:21

## 2023-01-29 RX ADMIN — Medication 10 MG: at 08:40

## 2023-01-29 RX ADMIN — HYDROMORPHONE HYDROCHLORIDE 0.5 MG: 1 INJECTION, SOLUTION INTRAMUSCULAR; INTRAVENOUS; SUBCUTANEOUS at 08:52

## 2023-01-29 RX ADMIN — SODIUM CHLORIDE, POTASSIUM CHLORIDE, SODIUM LACTATE AND CALCIUM CHLORIDE: 600; 310; 30; 20 INJECTION, SOLUTION INTRAVENOUS at 08:11

## 2023-01-29 RX ADMIN — PIPERACILLIN AND TAZOBACTAM 3.38 G: 3; .375 INJECTION, POWDER, LYOPHILIZED, FOR SOLUTION INTRAVENOUS at 09:21

## 2023-01-29 RX ADMIN — PROPOFOL 20 MG: 10 INJECTION, EMULSION INTRAVENOUS at 08:39

## 2023-01-29 RX ADMIN — SUGAMMADEX 200 MG: 100 INJECTION, SOLUTION INTRAVENOUS at 08:50

## 2023-01-29 RX ADMIN — ONDANSETRON 4 MG: 2 INJECTION INTRAMUSCULAR; INTRAVENOUS at 08:30

## 2023-01-29 RX ADMIN — HYDROMORPHONE HYDROCHLORIDE 0.5 MG: 1 INJECTION, SOLUTION INTRAMUSCULAR; INTRAVENOUS; SUBCUTANEOUS at 08:49

## 2023-01-29 RX ADMIN — PHENYLEPHRINE HYDROCHLORIDE 100 MCG: 10 INJECTION INTRAVENOUS at 08:37

## 2023-01-29 RX ADMIN — Medication 20 MG: at 08:30

## 2023-01-29 RX ADMIN — PIPERACILLIN AND TAZOBACTAM 3.38 G: 3; .375 INJECTION, POWDER, LYOPHILIZED, FOR SOLUTION INTRAVENOUS at 03:38

## 2023-01-29 RX ADMIN — FENTANYL CITRATE 100 MCG: 50 INJECTION, SOLUTION INTRAMUSCULAR; INTRAVENOUS at 08:21

## 2023-01-29 RX ADMIN — OXYCODONE HYDROCHLORIDE 5 MG: 5 TABLET ORAL at 13:17

## 2023-01-29 RX ADMIN — SUCCINYLCHOLINE CHLORIDE 150 MG: 20 INJECTION, SOLUTION INTRAMUSCULAR; INTRAVENOUS; PARENTERAL at 08:20

## 2023-01-29 RX ADMIN — METOPROLOL TARTRATE 5 MG: 5 INJECTION INTRAVENOUS at 08:15

## 2023-01-29 RX ADMIN — PROPOFOL 30 MG: 10 INJECTION, EMULSION INTRAVENOUS at 08:51

## 2023-01-29 RX ADMIN — PROPOFOL 30 MG: 10 INJECTION, EMULSION INTRAVENOUS at 08:27

## 2023-01-29 RX ADMIN — CEFAZOLIN 2 G: 10 INJECTION, POWDER, FOR SOLUTION INTRAVENOUS at 08:11

## 2023-01-29 RX ADMIN — Medication 10 MG: at 08:41

## 2023-01-29 RX ADMIN — ACETAMINOPHEN 975 MG: 325 TABLET, FILM COATED ORAL at 09:32

## 2023-01-29 ASSESSMENT — ACTIVITIES OF DAILY LIVING (ADL)
ADLS_ACUITY_SCORE: 35
ADLS_ACUITY_SCORE: 33
ADLS_ACUITY_SCORE: 35
ADLS_ACUITY_SCORE: 33
ADLS_ACUITY_SCORE: 35
ADLS_ACUITY_SCORE: 33
ADLS_ACUITY_SCORE: 33

## 2023-01-29 NOTE — ANESTHESIA CARE TRANSFER NOTE
Patient: Inocente Rios    Procedure: Procedure(s):  APPENDECTOMY, LAPAROSCOPIC       Diagnosis: Acute appendicitis [K35.80]  Diagnosis Additional Information: No value filed.    Anesthesia Type:   No value filed.     Note:    Oropharynx: oropharynx clear of all foreign objects and spontaneously breathing  Level of Consciousness: drowsy  Oxygen Supplementation: nasal cannula  Level of Supplemental Oxygen (L/min / FiO2): 4  Independent Airway: airway patency satisfactory and stable  Dentition: dentition unchanged  Vital Signs Stable: post-procedure vital signs reviewed and stable  Report to RN Given: handoff report given  Patient transferred to: PACU    Handoff Report: Identifed the Patient, Identified the Reponsible Provider, Reviewed the pertinent medical history, Discussed the surgical course, Reviewed Intra-OP anesthesia mangement and issues during anesthesia, Set expectations for post-procedure period and Allowed opportunity for questions and acknowledgement of understanding      Vitals:  Vitals Value Taken Time   /74 01/29/23 0909   Temp     Pulse 106 01/29/23 0912   Resp     SpO2 95 % 01/29/23 0913   Vitals shown include unvalidated device data.    Electronically Signed By: SAYRA Conte CRNA  January 29, 2023  9:14 AM

## 2023-01-29 NOTE — H&P
[facility]    History and Physical - Emergency General Surgery Service       Date of Admission:  1/28/2023    Assessment & Plan: Surgery   Inocente Rios is a 67 year old male with history of COPD, CAD s/p LAD stent placement, out of hospital VFib arrest associated w/ anterior MI s/p ICD placement (2015), prostate cancer s/p robotic radical prostatectomy (2020) and pelvic radiation (2022), presenting with < 1 day abdominal pain, nausea, vomiting, anorexia, CT consistent with acute appendicitis without perforation. Afebrile, hemodynamically normal, no leukocytosis.   - Discussed CT findings and management of acute appendicitis with patient and wife at bedside, including laparoscopic appendectomy, possibility of converting to open appendectomy, as well as non-operative management with antibiotics, risks and benefits of each. Patient would like to proceed with appendectomy.   - Plan for laparoscopic appendectomy 1/29. OR called to add on to schedule, pre-op orders placed.   - Continue Zosyn q6hr  - NPO for surgery  - Multimodal pain control         Diet: NPO per Anesthesia Guidelines for Procedure/Surgery Except for: Meds    DVT Prophylaxis: Pneumatic Compression Devices  Zayas Catheter: Not present  Lines: None     Drains: None     Cardiac Monitoring: None  Code Status:   Full    Clinically Significant Risk Factors Present on Admission          # Hypocalcemia: Lowest Ca = 8.3 mg/dL in last 2 days, will monitor and replace as appropriate       # Drug Induced Platelet Defect: home medication list includes an antiplatelet medication                Disposition Plan  Likely discharge from PACU     Expected Discharge Date: 01/29/2023                 The patient's care was discussed with the chief resident, Dr. Gomez, who will discuss with staff.     Radha Cooper MD  [facility]  Text page via [facility] Paging/Directory      ______________________________________________________________________    Chief Complaint   Acute appendicitis    History is obtained from the patient    History of Present Illness   Inocente Rios is a 67 year old male with history of COPD, CAD s/p LAD stent placement, out of hospital VFib arrest associated w/ anterior MI s/p ICD placement (2015), prostate cancer s/p robotic radical prostatectomy (2020) and pelvic radiation (2022), presenting with < 1 day abdominal pain, nausea, vomiting, anorexia. Pain started 1/28 in the afternoon, primarily RLQ, does not radiate. Pain progressively worsened throughout the afternoon prompting him to come to the ED. Nausea and vomiting started around the same time, multiple episodes of non-bloody, non-bilious emesis. Not much appetite all day and has not been able to keep anything down, including water. No urinary symptoms or change in bowel habits. No subjective fever/chills. No SOB or chest pain.     Past Medical History    Past Medical History:   Diagnosis Date     Anoxic encephalopathy (H) 7/16/15     Asthma      Cardiac arrest (H) 7/16/15     Cardiomyopathy (H)      Cardiomyopathy, ischemic      Congestive heart failure, unspecified      Coronary artery disease      Coronary artery disease      Depressive disorder      High cholesterol      Hyperlipidemia      Keratosis      MI (myocardial infarction) (H) 7/10/15    anterior     BECKI (obstructive sleep apnea)     no CPAP     Prostate cancer (H) 3/30/2022     Uncomplicated asthma        Past Surgical History   Past Surgical History:   Procedure Laterality Date     CARDIAC CATHETERIZATION       CORONARY STENT PLACEMENT       EP ICD INSERT       HERNIA REPAIR       INSERT / REPLACE / REMOVE PACEMAKER       LAPAROSCOPIC HERNIORRHAPHY INGUINAL Bilateral 11/21/2016    Procedure: LAPAROSCOPIC BILATERAL INGUINAL HERNIA REPAIR;  Surgeon: Yung Anderson MD;  Location: [facility];  Service:      OTHER SURGICAL HISTORY   7/10/15    coronary stentsmid lad     OTHER SURGICAL HISTORY  7/22/15    icd dual, [facility]     PICC  7/18/2015            Prior to Admission Medications   Prior to Admission Medications   Prescriptions Last Dose Informant Patient Reported? Taking?   ASPIRIN PO   Yes No   Sig: Take 81 mg by mouth   albuterol (PROAIR HFA/PROVENTIL HFA/VENTOLIN HFA) 108 (90 Base) MCG/ACT inhaler   No No   Sig: Inhale 2 puffs into the lungs every 4 hours as needed for shortness of breath / dyspnea or wheezing   atorvastatin (LIPITOR) 40 MG tablet   No No   Sig: TAKE 1 TABLET (40 MG TOTAL) BY MOUTH DAILY.   metoprolol succinate ER (TOPROL XL) 25 MG 24 hr tablet   No No   Sig: TAKE 1 TABLET (25 MG) BY MOUTH DAILY   order for DME   No No   Sig: Equipment being ordered: Home blood pressure monitor   sildenafil (VIAGRA) 100 MG tablet   No No   Sig: Take one half to one tablet as needed.   tadalafil (CIALIS) 20 MG tablet   Yes No   Sig: Take 10 mg by mouth twice a week      Facility-Administered Medications: None        Family History   I have reviewed this patient's family history and updated it with pertinent information if needed.  Family History   Problem Relation Age of Onset     Prostate Cancer Father      Diabetes No family hx of      Coronary Artery Disease No family hx of      Parkinsonism Mother      Cancer Father      No Known Problems Sister      No Known Problems Sister        Allergies   No Known Allergies     Physical Exam   Vital Signs: Temp: 98.5  F (36.9  C) Temp src: Oral BP: 110/58 Pulse: 99   Resp: 16 SpO2: 95 % O2 Device: None (Room air)    Weight: 163 lbs 0 ozNo intake or output data in the 24 hours ending 01/29/23 0255  Gen: NAD, nontoxic appearing  Resp: breathing nonlabored on RA  CV: regular rate  Abd: soft, non-distended, TTP RLQ, no diffuse peritonitis  Ext: wwp  Neuro: AOx4          Data     I have personally reviewed the following data over the past 24 hrs:    10.8  \   14.0   / 204     136 104 13.7 /  133  (H)   3.9 24 0.90 \       ALT: 24 AST: 29 AP: 66 TBILI: 0.5   ALB: 3.8 TOT PROTEIN: 6.2 (L) LIPASE: 28       Imaging results reviewed over the past 24 hrs:   Recent Results (from the past 24 hour(s))   CT Abdomen Pelvis w Contrast   Result Value    Radiologist flags Acute comminuted appendicitis (Urgent)    Narrative    EXAM: CT ABDOMEN PELVIS W CONTRAST  1/28/2023 7:16 PM     HISTORY:  RLQ pain, vomiting       COMPARISON:  PET/CT 1/10/2022.    TECHNIQUE: Helical technique CT abdomen and pelvis with IV contrast  (100 cc IV Isovue 370); axial slices with sagittal and coronal  reconstructions. Total DLP: 351 mGy*cm.    FINDINGS:  LUNG BASES:  Bibasilar and lingular atelectasis.    ABDOMEN:  The appendix is dilated up to 1.3 cm with mild adjacent inflammatory  change/stranding, previously with normal morphology on 1/10/2022,  demonstrating acute appendicitis. No evidence of appendiceal  perforation or complication.    The small bowel is unremarkable. Diverticulosis without evidence of  diverticulitis. Tiny fat-containing umbilical hernia. The liver is  unremarkable. Spleen is within normal limits. Small anterior splenule.  The pancreas is unremarkable. No focal adrenal nodules. The kidneys  are unremarkable without hydronephrosis. Normal renal cortical  enhancement bilaterally.    PELVIS:  Prostatectomy, without surgical bed nodule/mass seen. The bladder is  unremarkable. No significant pelvic free fluid.    VASCULATURE AND LYMPH NODES:  Trace aortoiliac atherosclerotic calcification. Abdominal aorta is  nonaneurysmal.    BONES AND SOFT TISSUES:  Benign bony island within the left proximal femur. Degenerative  changes of the visualized spine. No aggressive or suspicious  osseous/soft tissue lesion identified.      Impression    IMPRESSION:  1. Acute uncomplicated appendicitis.  2. Diverticulosis without evidence of diverticulitis.  3. Prostatectomy without surgical bed nodule/mass seen.    [Urgent Result: Acute  comminuted appendicitis]    Finding was identified on 1/28/2023 7:51 PM.     Dr. Joshi was contacted by Dr. Goel at 1/28/2023 8:07 PM and  verbalized understanding of the urgent finding.

## 2023-01-29 NOTE — ANESTHESIA PREPROCEDURE EVALUATION
"Anesthesia Pre-Procedure Evaluation    Patient: Inocente Rios   MRN: [id number] : 1955        Procedure : Procedure(s):  APPENDECTOMY, LAPAROSCOPIC          Past Medical History:   Diagnosis Date     Anoxic encephalopathy (H) 7/16/15     Asthma      Cardiac arrest (H) 7/16/15     Cardiomyopathy (H)      Cardiomyopathy, ischemic      Congestive heart failure, unspecified      Coronary artery disease      Coronary artery disease      Depressive disorder      High cholesterol      Hyperlipidemia      Keratosis      MI (myocardial infarction) (H) 7/10/15    anterior     BECKI (obstructive sleep apnea)     no CPAP     Prostate cancer (H) 3/30/2022     Uncomplicated asthma       Past Surgical History:   Procedure Laterality Date     CARDIAC CATHETERIZATION       CORONARY STENT PLACEMENT       EP ICD INSERT       HERNIA REPAIR       INSERT / REPLACE / REMOVE PACEMAKER       LAPAROSCOPIC HERNIORRHAPHY INGUINAL Bilateral 2016    Procedure: LAPAROSCOPIC BILATERAL INGUINAL HERNIA REPAIR;  Surgeon: Yung Anderson MD;  Location: [facility];  Service:      OTHER SURGICAL HISTORY  7/10/15    coronary stentsmid lad     OTHER SURGICAL HISTORY  7/22/15    icd dual, [facility]     PICC  2015           No Known Allergies   Social History     Tobacco Use     Smoking status: Never     Smokeless tobacco: Never   Substance Use Topics     Alcohol use: Yes     Alcohol/week: 7.0 standard drinks      Wt Readings from Last 1 Encounters:   23 71.9 kg (158 lb 9.6 oz)        Anesthesia Evaluation   Pt has had prior anesthetic. Type: MAC and General.        ROS/MED HX  ENT/Pulmonary:       Neurologic: Comment: Has Dx of \"anoxic encephalopathy\" probably associated with post MI cardiac arrest.  However, current neuro status is completely normal.      Cardiovascular:     (+) --CAD --stent-. ICD Reason placed:Cardiac arrest.     METS/Exercise Tolerance:     Hematologic:  - neg hematologic  ROS   "   Musculoskeletal:  - neg musculoskeletal ROS     GI/Hepatic:  - neg GI/hepatic ROS     Renal/Genitourinary:  - neg Renal ROS     Endo:  - neg endo ROS     Psychiatric/Substance Use:  - neg psychiatric ROS     Infectious Disease:  - neg infectious disease ROS     Malignancy:       Other:            Physical Exam    Airway        Mallampati: II   TM distance: > 3 FB   Neck ROM: full   Mouth opening: > 3 cm    Respiratory Devices and Support         Dental       (+) Completely normal teeth      Cardiovascular   cardiovascular exam normal          Pulmonary   pulmonary exam normal                OUTSIDE LABS:  CBC:   Lab Results   Component Value Date    WBC 10.8 01/28/2023    HGB 14.0 01/28/2023    HGB 13.7 09/06/2019    HCT 42.7 01/28/2023    HCT 44.7 09/06/2019     01/28/2023     BMP:   Lab Results   Component Value Date     01/28/2023     09/10/2021    POTASSIUM 3.9 01/28/2023    POTASSIUM 4.2 09/10/2021    CHLORIDE 104 01/28/2023    CHLORIDE 106 09/10/2021    CO2 24 01/28/2023    CO2 27 09/10/2021    BUN 13.7 01/28/2023    BUN 10 09/10/2021    CR 0.90 01/28/2023    CR 0.99 09/10/2021     (H) 01/28/2023    GLC 68 (L) 09/10/2021     COAGS: No results found for: PTT, INR, FIBR  POC: No results found for: BGM, HCG, HCGS  HEPATIC:   Lab Results   Component Value Date    ALBUMIN 3.8 01/28/2023    PROTTOTAL 6.2 (L) 01/28/2023    ALT 24 01/28/2023    AST 29 01/28/2023    ALKPHOS 66 01/28/2023    BILITOTAL 0.5 01/28/2023     OTHER:   Lab Results   Component Value Date    ALEENA 8.3 (L) 01/28/2023    LIPASE 28 01/28/2023       Anesthesia Plan    ASA Status:  3      Anesthesia Type: General.     - Airway: ETT              Consents    Anesthesia Plan(s) and associated risks, benefits, and realistic alternatives discussed. Questions answered and patient/representative(s) expressed understanding.    - Discussed:     - Discussed with:  Patient      - Extended Intubation/Ventilatory Support Discussed: No.       - Patient is DNR/DNI Status: No    Use of blood products discussed: Yes.     - Discussed with: Patient.     Postoperative Care       PONV prophylaxis: Ondansetron (or other 5HT-3)     Comments:                Greg Shepard MD

## 2023-01-29 NOTE — BRIEF OP NOTE
[facility]    Brief Operative Note    Pre-operative diagnosis: Acute appendicitis [K35.80]  Post-operative diagnosis Same as pre-operative diagnosis    Procedure: Procedure(s):  APPENDECTOMY, LAPAROSCOPIC  Surgeon: Surgeon(s) and Role:     * Tung Rodas MD - Primary     * Kait Gomez MD - Resident - Assisting  Anesthesia: General   Estimated Blood Loss: 5ml    Drains: None  Specimens:   ID Type Source Tests Collected by Time Destination   1 : Appendix Tissue Appendix SURGICAL PATHOLOGY EXAM Tung Rodas MD 1/29/2023  8:43 AM      Findings:   Acute appendicitis. No perforation or purulence..  Complications: None.  Implants: * No implants in log *

## 2023-01-29 NOTE — DISCHARGE SUMMARY
[facility]  Surgery Discharge Summary      Date of Admission:  1/28/2023  Date of Discharge:  1/29/2023  1:56 PM  Discharging Provider: Kait Gomez MD  Discharge Service: General Surgery    Discharge Diagnoses   Acute appendicitis    Follow-ups Needed After Discharge   Follow-up Appointments     Follow Up and recommended labs and tests      Our clinic nurses will call you in a few days to check in. No need for   follow up appointment if you are doing well.           Discharge Disposition   Discharged to home  Condition at discharge: Stable    Hospital Course   Inocente Rios is a 67 year old male with history of COPD, CAD s/p LAD stent placement, out of hospital VFib arrest associated w/ anterior MI s/p ICD placement (2015), prostate cancer s/p robotic radical prostatectomy (2020) and pelvic radiation (2022), presenting with < 1 day abdominal pain, nausea, vomiting, anorexia, CT consistent with acute appendicitis without perforation.     Taken to OR on 1/29 for uncomplicated lap appy which. Post op course was unremarkable. His diet was advanced to a regular diet and pain was controlled with PO meds.    Consultations This Hospital Stay   None    Code Status   Prior      MD [facility] [address] [address]  Phone: [phone]  ______________________________________________________________________    Physical Exam   Vital Signs: Temp: 97.8  F (36.6  C) Temp src: Oral BP: 103/66 Pulse: 98   Resp: 16 SpO2: 92 % O2 Device: None (Room air) Oxygen Delivery: 2 LPM  Weight: 158 lbs 9.6 oz  Gen: awake, alert, NAD  Pulm: non-labored breathing  Abd: soft, appropriately TTP, dressings clean and dry  Ext: wwp    Primary Care Physician   Jessy Fletcher    Discharge Orders      Reason for your hospital stay    Acute appendicitis     Activity    Your activity upon discharge: activity as tolerated and no heavy  lifting 15lb for 3 weeks     Discharge Instructions    May start regular diet immediately.    No lifting >15 pounds for 3 weeks.  No rigorous physical activity.    Remove outer bandages in 24 hours. There are steristrips under this bandages. These will come off with time.Can shower in 24 hours.  No bathing for two weeks.    Call [phone] to schedule or with questions during the week days.      Call [phone] and ask to speak with surgery resident if you are having troubles in the evenings, at night, or on weekends. Please call if you experience increasing abdominal pain, nausea, vomiting, increasing drainage from your wounds, chills, or fever >101.5    Take stool softener while taking narcotic pain medication.    No driving for at least 12 hours after taking narcotic pain medication.     Follow Up and recommended labs and tests    Our clinic nurses will call you in a few days to check in. No need for follow up appointment if you are doing well.     Diet    Follow this diet upon discharge: regular diet       Significant Results and Procedures   Results for orders placed or performed during the hospital encounter of 01/28/23   CT Abdomen Pelvis w Contrast     Value    Radiologist flags Acute uncomplicated appendicitis (Urgent)    Narrative    EXAM: CT ABDOMEN PELVIS W CONTRAST  1/28/2023 7:16 PM     HISTORY:  RLQ pain, vomiting       COMPARISON:  PET/CT 1/10/2022.    TECHNIQUE: Helical technique CT abdomen and pelvis with IV contrast  (100 cc IV Isovue 370); axial slices with sagittal and coronal  reconstructions. Total DLP: 351 mGy*cm.    FINDINGS:  LUNG BASES:  Bibasilar and lingular atelectasis.    ABDOMEN:  The appendix is dilated up to 1.3 cm with mild adjacent inflammatory  change/stranding, previously with normal morphology on 1/10/2022,  demonstrating acute appendicitis. No evidence of appendiceal  perforation or complication.    The small bowel is unremarkable. Diverticulosis without evidence  of  diverticulitis. Tiny fat-containing umbilical hernia. The liver is  unremarkable. Spleen is within normal limits. Small anterior splenule.  The pancreas is unremarkable. No focal adrenal nodules. The kidneys  are unremarkable without hydronephrosis. Normal renal cortical  enhancement bilaterally.    PELVIS:  Prostatectomy, without surgical bed nodule/mass seen. The bladder is  unremarkable. No significant pelvic free fluid.    VASCULATURE AND LYMPH NODES:  Trace aortoiliac atherosclerotic calcification. Abdominal aorta is  nonaneurysmal.    BONES AND SOFT TISSUES:  Benign bony island within the left proximal femur. Degenerative  changes of the visualized spine. No aggressive or suspicious  osseous/soft tissue lesion identified.      Impression    IMPRESSION:  1. Acute uncomplicated appendicitis.  2. Diverticulosis without evidence of diverticulitis.  3. Prostatectomy without surgical bed nodule/mass seen.    [Urgent Result: Acute uncomplicated appendicitis]    Finding was identified on 1/28/2023 7:51 PM.     Dr. Joshi was contacted by Dr. Goel at 1/28/2023 8:07 PM and  verbalized understanding of the urgent finding.     I have personally reviewed the examination and initial interpretation  and I agree with the findings.    NADIA CELIS MD         SYSTEM ID:  [id number]       Discharge Medications   Discharge Medication List as of 1/29/2023  1:20 PM      START taking these medications    Details   oxyCODONE (ROXICODONE) 5 MG tablet Take 1-2 tablets (5-10 mg) by mouth every 4 hours as needed for pain, Disp-12 tablet, R-0, Local Print      senna-docusate (SENOKOT-S/PERICOLACE) 8.6-50 MG tablet Take 2 tablets by mouth daily Take while taking oxycodone. Hold for loose stools., Disp-20 tablet, R-0, E-Prescribe         CONTINUE these medications which have NOT CHANGED    Details   albuterol (PROAIR HFA/PROVENTIL HFA/VENTOLIN HFA) 108 (90 Base) MCG/ACT inhaler Inhale 2 puffs into the lungs every 4 hours as needed for  shortness of breath / dyspnea or wheezing, Disp-18 g,R-1, E-PrescribePharmacy may dispense brand covered by insurance (Proair, or proventil or ventolin or generic albuterol inhaler)      ASPIRIN PO Take 81 mg by mouth, Historical      atorvastatin (LIPITOR) 40 MG tablet TAKE 1 TABLET (40 MG TOTAL) BY MOUTH DAILY., Disp-90 tablet, R-1, E-Prescribe      metoprolol succinate ER (TOPROL XL) 25 MG 24 hr tablet TAKE 1 TABLET (25 MG) BY MOUTH DAILY, Disp-90 tablet, R-0, E-PrescribeNeeds follow-up with Dr Wells for refills.      order for DME Equipment being ordered: Home blood pressure monitorDisp-1 Units, R-0, Local Print      sildenafil (VIAGRA) 100 MG tablet Take one half to one tablet as needed., Disp-20 tablet, R-11, Local Print      tadalafil (CIALIS) 20 MG tablet Take 10 mg by mouth twice a week, Historical           Allergies   No Known Allergies

## 2023-01-29 NOTE — PROGRESS NOTES
Observation Goals:  - Diagnostic tests and consults completed and resulted - Not met  - Vital signs normal or at patient baseline - Not met   - Tolerating oral intake to maintain hydration - Not met  - Adequate pain control on oral analgesics - Not Met

## 2023-01-29 NOTE — ANESTHESIA POSTPROCEDURE EVALUATION
Patient: Inocente Rios    Procedure: Procedure(s):  APPENDECTOMY, LAPAROSCOPIC       Anesthesia Type:  General    Note:  Disposition: Admission   Postop Pain Control: Uneventful            Sign Out: Well controlled pain   PONV: No   Neuro/Psych: Uneventful            Sign Out: Acceptable/Baseline neuro status   Airway/Respiratory: Uneventful            Sign Out: Acceptable/Baseline resp. status   CV/Hemodynamics: Uneventful            Sign Out: Acceptable CV status; No obvious hypovolemia; No obvious fluid overload   Other NRE: NONE   DID A NON-ROUTINE EVENT OCCUR? No           Last vitals:  Vitals Value Taken Time   /68 01/29/23 0945   Temp 36.8  C (98.2  F) 01/29/23 0955   Pulse 97 01/29/23 0952   Resp 16 01/29/23 0955   SpO2 99 % 01/29/23 0952   Vitals shown include unvalidated device data.    Electronically Signed By: Greg Shepard MD  January 29, 2023  11:18 AM

## 2023-01-29 NOTE — OP NOTE
[facility]    Operative Note    Pre-operative diagnosis: Acute appendicitis [K35.80]   Post-operative diagnosis Same   Procedure: Procedure(s):  APPENDECTOMY, LAPAROSCOPIC   Surgeon: Surgeon(s) and Role:     * Tung Rodas MD - Primary     * Kait Gomez MD - Resident - Assisting   Anesthesia: General    Estimated blood loss: 5ml   Drains: None   Specimens: ID Type Source Tests Collected by Time Destination   1 : Appendix Tissue Appendix SURGICAL PATHOLOGY EXAM Tung Rodas MD 1/29/2023  8:43 AM       Findings: Acute appendicitis. No perforation or purulence   Complications: None.   Implants: None.         OPERATIVE INDICATIONS:  Inocente Rios is a 67 year old-year-old male with a history of right lower quadrant abdominal pain.  Presentation and CT scan consistent with acute appendicitis.    After understanding the risks and benefits of proceeding with surgery, the patient has an indication for laparoscopic appendectomy and consented to undergo surgery.    OPERATIVE DETAILS:  The patient was brought to the operating room and prepared in a routine fashion.  A timeout was performed prior to surgery and documented by the nursing team.    Under the benefits of general anesthesia, a left upper quadrant Veress needle was inserted and pneumoperitoneum was established using carbon dioxide gas to a maximum pressure of 15 mmHg.  A total of 3 ports were placed and the 5 mm laparoscope was utilized.    The patient was moved into a position with the right side up. The cecum was identified and the appendix was identified at the confluence of the tenia coli. The appendix was elevated.  Lysis of adhesions was performed to mobilize the appendix. There was fibrinous exudate overlying the appendix but no perforation or purulence. Mesoappendix was divided with ligasure device.  A blue load endo-MILLICENT stapler was then used to divide the appendix at its base.  The appendix was placed into an endocatch bag and removed from the 12mm port. Hemostasis was confirmed. The 12mm incision was closed using a single 0 Vicryl suture.    All skin incisions were closed using 4-0 monocryl suture and steristrips were applied.    Dr. Rodas was present for all critical components of the operation, and all needle and sponge counts were correct x2 at the end of the procedure.    Kait Gomez MD, MS  General Surgery, PGY-7  Pg [phone]

## 2023-01-29 NOTE — PROGRESS NOTES
"Goal outcome evaluation:    Plan of Care Reviewed with: Patient  Overall Patient Progress: No change  Admitted/transferred from: ED  2 RN full skin assessment completed by Zachary Frazier, RN and Alex CRESPO RN.  Skin assessment finding: No issues found. Pre-existing transverse abd incision.   Interventions/actions: Educated on importance of frequent repositioning and ambulation. Educated on risks of pressure injuries.     Will continue to monitor.    VS BP (!) 144/77 (BP Location: Left arm, Patient Position: Supine, Cuff Size: Adult Regular)   Pulse 105   Temp 98.9  F (37.2  C) (Oral)   Resp 17   Ht 1.778 m (5' 10\")   Wt 71.9 kg (158 lb 9.6 oz)   SpO2 96%   BMI 22.76 kg/m     Activity: IND  Neuros: A&O x4. Able to make needs known.  Cardiac: Elevated BPs. Tachy. Denies cardiac chest pain.  Respiratory: WDL. RA. Denies SOB  GI/: Voiding AUOP, spon. LBM 1/28  Diet: NPO  Lines/Drains: (R) PIV infusing TKO + abx  Labs: Reviewed  Pain/nausea: 4-5/10 pain. Managed w/ PRN oxycodone   Plan: OR today per team note.  "

## 2023-01-29 NOTE — ANESTHESIA PROCEDURE NOTES
Airway       Patient location during procedure: OR       Procedure Start/Stop Times: 1/29/2023 8:23 AM  Staff -        Anesthesiologist:  Greg Shepard MD       CRNA: Michaela Araiza APRN CRNA       Performed By: CRNAIndications and Patient Condition       Indications for airway management: marcial-procedural       Induction type:RSI       Mask difficulty assessment: 0 - not attempted    Final Airway Details       Final airway type: endotracheal airway       Successful airway: ETT - single  Endotracheal Airway Details        ETT size (mm): 7.5       Cuffed: yes       Successful intubation technique: direct laryngoscopy       DL Blade Type: Lemon 2       Grade View of Cords: 1       Adjucts: stylet       Position: Right       Measured from: lips       Secured at (cm): 23       Bite block used: None    Post intubation assessment        Placement verified by: capnometry, equal breath sounds and chest rise        Number of attempts at approach: 1       Secured with: silk tape       Ease of procedure: easy       Dentition: Intact and Unchanged    Medication(s) Administered   Medication Administration Time: 1/29/2023 8:23 AM

## 2023-01-30 ENCOUNTER — TELEPHONE (OUTPATIENT)
Dept: CARDIOLOGY | Facility: CLINIC | Age: 68
End: 2023-01-30
Payer: COMMERCIAL

## 2023-01-30 DIAGNOSIS — I49.01 VENTRICULAR FIBRILLATION (H): ICD-10-CM

## 2023-01-30 DIAGNOSIS — I25.10 CORONARY ARTERY DISEASE INVOLVING NATIVE CORONARY ARTERY OF NATIVE HEART WITHOUT ANGINA PECTORIS: ICD-10-CM

## 2023-01-30 RX ORDER — METOPROLOL SUCCINATE 25 MG/1
25 TABLET, EXTENDED RELEASE ORAL DAILY
Qty: 90 TABLET | Refills: 0 | Status: SHIPPED | OUTPATIENT
Start: 2023-01-30 | End: 2023-05-11

## 2023-01-30 NOTE — TELEPHONE ENCOUNTER
[facility] Call Center    Phone Message    May a detailed message be left on voicemail: yes     Reason for Call: Medication Refill Request    Has the patient contacted the pharmacy for the refill? Yes   Name of medication being requested: metoprolol succinate ER (TOPROL XL) 25 MG 24 hr tablet  atorvastatin (LIPITOR) 40 MG tablet  Provider who prescribed the medication: Dr. Wells  Pharmacy: [facility] [address]    Date medication is needed: 01/30/2023       **PT OF DR. WELLS. HAS UPCOMING APPT WITH DR. HCRISTINA. THANK YOU      Action Taken: Message routed to:  Other: CARDIOLOGY    Travel Screening: Not Applicable       Thank you!  [facility]

## 2023-01-31 ENCOUNTER — PATIENT OUTREACH (OUTPATIENT)
Dept: SURGERY | Facility: CLINIC | Age: 68
End: 2023-01-31
Payer: COMMERCIAL

## 2023-01-31 NOTE — PROGRESS NOTES
RN Post-Op/Post-Discharge Care Coordination Note    Mr. Inocente Rios is a 67 year old male who underwent laparoscopic appendectomy on 1/29 with  Dr. Tung Rodas.  Spoke with Patient.    Support  Patient able to care for self independently     Health Status  Nausea/Vomiting: Patient denies nausea/vomiting.  Eating/drinking: Patient is able to eat and drink without any complaints.  Bowel habits: Patient reports having loose stools that started yesterday. He plans to remove fiber from his diet for a few days, take a Probiotic or eat Greek yogurt daily, and he may take an OTC antidiarrheal. He is not taking Senna today - did take one dose yesterday.  If no improvement in 48 hours he will call this office.  Drains (JORGITO): N/A  Fevers/chills: Patient denies any fever or chills.  Incisions: Patient denies any signs and symptoms of infection..  Wound closure:  Steri-strips  Pain: Improving  New Medications:  Oxycodone, Senna    Activity/Restrictions  No lifting in excess of 15-20 pounds for 3-4 weeks    Equipment  None    Pathology reviewed with patient:  No, not yet available    Forms/Letters  No    All of his questions were answered including reviewing restrictions and wound care.  He will call this office if he has any further questions and/or concerns.      In lieu of a post-op clinic patient that patient would like to be contacted in approximately 7-10 days via telephone.    A copy of this note was routed to the primary surgeon.      Whom and When to Call  Patient acknowledges understanding of how to manage any medication changes and   when to seek medical care.     Patient advised that if after hour medical concerns arise to please call [phone] and choose option 4 to speak to the physician on call.

## 2023-02-01 LAB
PATH REPORT.COMMENTS IMP SPEC: NORMAL
PATH REPORT.COMMENTS IMP SPEC: NORMAL
PATH REPORT.FINAL DX SPEC: NORMAL
PATH REPORT.GROSS SPEC: NORMAL
PATH REPORT.MICROSCOPIC SPEC OTHER STN: NORMAL
PATH REPORT.RELEVANT HX SPEC: NORMAL
PHOTO IMAGE: NORMAL

## 2023-02-07 ENCOUNTER — PATIENT OUTREACH (OUTPATIENT)
Dept: SURGERY | Facility: CLINIC | Age: 68
End: 2023-02-07
Payer: COMMERCIAL

## 2023-02-07 DIAGNOSIS — I25.10 CORONARY ARTERY DISEASE INVOLVING NATIVE HEART WITHOUT ANGINA PECTORIS, UNSPECIFIED VESSEL OR LESION TYPE: ICD-10-CM

## 2023-02-07 RX ORDER — ATORVASTATIN CALCIUM 40 MG/1
TABLET, FILM COATED ORAL
Qty: 90 TABLET | Refills: 1 | Status: SHIPPED | OUTPATIENT
Start: 2023-02-07 | End: 2023-11-24

## 2023-02-07 NOTE — PROGRESS NOTES
Inocente Rios was contacted several days post procedure via telephone for a status update and elected to have a telephone follow -up call approximately 7-10 days after original contact in lieu of a clinic visit with Dr. Tung Rodas.  He continues to do well and the steri-strips  have not peeled off.  The patients wounds are healed and the area is C/D/I.  He is afebrile, discomfort improving and taking Tylenol PRN, and mark po; the patient is slowly resuming his normal activity.   Discussed restrictions including no lifting in excess of 15-20 pounds for 3 weeks.    Pathology was reviewed with the patient: Yes    All of Inocente Rios question's were answered and  he would like to return to the clinic on a PRN basis.  The patient is aware that  he may schedule a post op appointment at any time.    A copy of this note was routed to the patients surgeon.      Pathology:  Case Report   Surgical Pathology Report                         Case: [id number]                                   Authorizing Provider:  Tung Rodas MD  Collected:           01/29/2023 08:43 AM           Ordering Location:      MAIN OR                 Received:            01/30/2023 08:17 AM           Pathologist:           Farhat Evans DO                                                             Specimen:    Appendix                                                                                   Final Diagnosis   A. APPENDIX, APPENDECTOMY:  - Acute appendicitis with acute serositis

## 2023-02-10 ENCOUNTER — TELEPHONE (OUTPATIENT)
Dept: SURGERY | Facility: CLINIC | Age: 68
End: 2023-02-10
Payer: COMMERCIAL

## 2023-02-10 NOTE — TELEPHONE ENCOUNTER
[facility]    Phone Message    May a detailed message be left on voicemail: yes     Reason for Call: Other: Inocente is calling in asking for a call back. Pt states that he would like to speak with KATHERIN Brown about some leg cramping he has been experiencing, and is unsure about whether this could be due to his recent procedure. Please call back as soon as possible to discuss.     Action Taken: Message routed to:  [facility] Gen Surg    Travel Screening: Not Applicable

## 2023-02-10 NOTE — TELEPHONE ENCOUNTER
Returned patients phone call to inquire about his leg cramping. He states this is mostly bothersome in the morning when he gets out of bed. He is able to stretch it out some, but it still is bothersome throughout the day. It is bilateral. He denies any redness in his calves, no numbness, pallor, and both feet are equally warm to the touch. He states he has not been drinking enough water/electrolytes and his activity level has been decreased since surgery.     Advised to increase his water and electrolyte intake. Advised on things like Powerade/Gatorade and to avoid caffiene for the next few days. Also advised to increase his activity level by doing more walking. He verbalizes understanding. Was also given instructions on s/sx of DVT and to seek emergent care if these develop.

## 2023-02-14 ENCOUNTER — LAB REQUISITION (OUTPATIENT)
Dept: LAB | Facility: CLINIC | Age: 68
End: 2023-02-14
Payer: MEDICARE

## 2023-02-14 DIAGNOSIS — M79.605 PAIN IN LEFT LEG: ICD-10-CM

## 2023-02-14 LAB — D DIMER PPP FEU-MCNC: 2.37 UG/ML FEU (ref 0–0.5)

## 2023-02-14 PROCEDURE — 85379 FIBRIN DEGRADATION QUANT: CPT | Mod: ORL | Performed by: STUDENT IN AN ORGANIZED HEALTH CARE EDUCATION/TRAINING PROGRAM

## 2023-03-08 ENCOUNTER — ANCILLARY ORDERS (OUTPATIENT)
Dept: CARDIOLOGY | Facility: CLINIC | Age: 68
End: 2023-03-08

## 2023-03-08 DIAGNOSIS — Z95.810 ICD (IMPLANTABLE CARDIOVERTER-DEFIBRILLATOR) IN PLACE: ICD-10-CM

## 2023-03-08 DIAGNOSIS — I49.01 VENTRICULAR FIBRILLATION (H): ICD-10-CM

## 2023-03-13 ENCOUNTER — OFFICE VISIT (OUTPATIENT)
Dept: CARDIOLOGY | Facility: CLINIC | Age: 68
End: 2023-03-13
Payer: MEDICARE

## 2023-03-13 ENCOUNTER — ANCILLARY PROCEDURE (OUTPATIENT)
Dept: CARDIOLOGY | Facility: CLINIC | Age: 68
End: 2023-03-13
Attending: INTERNAL MEDICINE
Payer: MEDICARE

## 2023-03-13 VITALS
SYSTOLIC BLOOD PRESSURE: 128 MMHG | WEIGHT: 164 LBS | HEART RATE: 72 BPM | RESPIRATION RATE: 16 BRPM | DIASTOLIC BLOOD PRESSURE: 72 MMHG | BODY MASS INDEX: 23.53 KG/M2

## 2023-03-13 DIAGNOSIS — Z86.74 H/O CARDIAC ARREST: ICD-10-CM

## 2023-03-13 DIAGNOSIS — I49.01 VF (VENTRICULAR FIBRILLATION) (H): ICD-10-CM

## 2023-03-13 DIAGNOSIS — Z95.810 ICD (IMPLANTABLE CARDIOVERTER-DEFIBRILLATOR) IN PLACE: ICD-10-CM

## 2023-03-13 DIAGNOSIS — I25.10 CORONARY ARTERY DISEASE INVOLVING NATIVE CORONARY ARTERY OF NATIVE HEART WITHOUT ANGINA PECTORIS: ICD-10-CM

## 2023-03-13 DIAGNOSIS — Z95.810 ICD (IMPLANTABLE CARDIOVERTER-DEFIBRILLATOR) IN PLACE: Primary | ICD-10-CM

## 2023-03-13 DIAGNOSIS — Z95.5 S/P CORONARY ARTERY STENT PLACEMENT: ICD-10-CM

## 2023-03-13 LAB
MDC_IDC_LEAD_IMPLANT_DT: NORMAL
MDC_IDC_LEAD_LOCATION: NORMAL
MDC_IDC_LEAD_LOCATION_DETAIL_1: NORMAL
MDC_IDC_LEAD_MFG: NORMAL
MDC_IDC_LEAD_MODEL: NORMAL
MDC_IDC_LEAD_POLARITY_TYPE: NORMAL
MDC_IDC_LEAD_SERIAL: NORMAL
MDC_IDC_MSMT_BATTERY_DTM: NORMAL
MDC_IDC_MSMT_BATTERY_REMAINING_LONGEVITY: 114 MO
MDC_IDC_MSMT_BATTERY_REMAINING_PERCENTAGE: 100 %
MDC_IDC_MSMT_BATTERY_STATUS: NORMAL
MDC_IDC_MSMT_CAP_CHARGE_DTM: NORMAL
MDC_IDC_MSMT_CAP_CHARGE_DTM: NORMAL
MDC_IDC_MSMT_CAP_CHARGE_ENERGY: 21 J
MDC_IDC_MSMT_CAP_CHARGE_TIME: 10.2 S
MDC_IDC_MSMT_CAP_CHARGE_TIME: 3.7 S
MDC_IDC_MSMT_CAP_CHARGE_TYPE: NORMAL
MDC_IDC_MSMT_CAP_CHARGE_TYPE: NORMAL
MDC_IDC_MSMT_LEADCHNL_RV_IMPEDANCE_VALUE: 772 OHM
MDC_IDC_MSMT_LEADCHNL_RV_PACING_THRESHOLD_AMPLITUDE: 1.1 V
MDC_IDC_MSMT_LEADCHNL_RV_PACING_THRESHOLD_PULSEWIDTH: 0.4 MS
MDC_IDC_MSMT_LEADCHNL_RV_SENSING_INTR_AMPL: 11.7 MV
MDC_IDC_PG_IMPLANT_DTM: NORMAL
MDC_IDC_PG_MFG: NORMAL
MDC_IDC_PG_MODEL: NORMAL
MDC_IDC_PG_SERIAL: NORMAL
MDC_IDC_PG_TYPE: NORMAL
MDC_IDC_SESS_CLINIC_NAME: NORMAL
MDC_IDC_SESS_DTM: NORMAL
MDC_IDC_SESS_TYPE: NORMAL
MDC_IDC_SET_BRADY_LOWRATE: 40 {BEATS}/MIN
MDC_IDC_SET_BRADY_MODE: NORMAL
MDC_IDC_SET_LEADCHNL_RV_PACING_AMPLITUDE: 2.3 V
MDC_IDC_SET_LEADCHNL_RV_PACING_POLARITY: NORMAL
MDC_IDC_SET_LEADCHNL_RV_PACING_PULSEWIDTH: 0.4 MS
MDC_IDC_SET_LEADCHNL_RV_SENSING_ADAPTATION_MODE: NORMAL
MDC_IDC_SET_LEADCHNL_RV_SENSING_POLARITY: NORMAL
MDC_IDC_SET_LEADCHNL_RV_SENSING_SENSITIVITY: 0.6 MV
MDC_IDC_SET_ZONE_DETECTION_INTERVAL: 250 MS
MDC_IDC_SET_ZONE_DETECTION_INTERVAL: 324 MS
MDC_IDC_SET_ZONE_TYPE: NORMAL
MDC_IDC_SET_ZONE_TYPE: NORMAL
MDC_IDC_SET_ZONE_VENDOR_TYPE: NORMAL
MDC_IDC_SET_ZONE_VENDOR_TYPE: NORMAL
MDC_IDC_STAT_BRADY_DTM_END: NORMAL
MDC_IDC_STAT_BRADY_DTM_START: NORMAL
MDC_IDC_STAT_BRADY_RV_PERCENT_PACED: 0 %
MDC_IDC_STAT_EPISODE_RECENT_COUNT: 0
MDC_IDC_STAT_EPISODE_RECENT_COUNT: 1
MDC_IDC_STAT_EPISODE_RECENT_COUNT_DTM_END: NORMAL
MDC_IDC_STAT_EPISODE_RECENT_COUNT_DTM_START: NORMAL
MDC_IDC_STAT_EPISODE_TYPE: NORMAL
MDC_IDC_STAT_EPISODE_VENDOR_TYPE: NORMAL
MDC_IDC_STAT_TACHYTHERAPY_ATP_DELIVERED_RECENT: 0
MDC_IDC_STAT_TACHYTHERAPY_ATP_DELIVERED_TOTAL: 0
MDC_IDC_STAT_TACHYTHERAPY_RECENT_DTM_END: NORMAL
MDC_IDC_STAT_TACHYTHERAPY_RECENT_DTM_START: NORMAL
MDC_IDC_STAT_TACHYTHERAPY_SHOCKS_ABORTED_RECENT: 0
MDC_IDC_STAT_TACHYTHERAPY_SHOCKS_ABORTED_TOTAL: 0
MDC_IDC_STAT_TACHYTHERAPY_SHOCKS_DELIVERED_RECENT: 0
MDC_IDC_STAT_TACHYTHERAPY_SHOCKS_DELIVERED_TOTAL: 1
MDC_IDC_STAT_TACHYTHERAPY_TOTAL_DTM_END: NORMAL
MDC_IDC_STAT_TACHYTHERAPY_TOTAL_DTM_START: NORMAL

## 2023-03-13 PROCEDURE — 99214 OFFICE O/P EST MOD 30 MIN: CPT | Performed by: INTERNAL MEDICINE

## 2023-03-13 PROCEDURE — 93282 PRGRMG EVAL IMPLANTABLE DFB: CPT | Performed by: INTERNAL MEDICINE

## 2023-03-13 NOTE — LETTER
3/13/2023    Jessy Fletcher MD  [address] [place] [address]    RE: Inocente Rios       Dear Colleague,     I had the pleasure of seeing Inocente Rios in the [facility].    HEART CARE ENCOUNTER NOTE      [facility]  [phone]      Assessment/Recommendations   Assessment:   1.CAD, with history of anterior MI , PCI/stent to LAD. Subsequent out of hospital Vfib arrest POD6 treated with citizen CPR, hypothermia, with complete recovery.   A. S/p ICD implant  B. Normal Stress MPI   C. No angina      Plan:   1.No med changes  2. Regular exercise routine  3. Follow-up 1 year           History of Present Illness/Subjective    HPI: Inocente Rios is a 67 year old male with a history of anterior MI in  treated with primary PCI and stent implant to the LAD.  He was doing well in the 6 postoperative day after he was home he sustained a V-fib out-of-hospital arrest.  He is treated with Citizen CPR and hypothermia.  He has stents that radiate PA angiogram were widely patent.  An ICD was implanted.  He is done well since then has had no recurrent problems.  His last stress MPI was in 2019 which showed normal function and no evidence of inducible ischemia.  He returns today for interrogation of his ICD and routine follow-up.    Studies reviewed:  EC23 personally reviewed: NSR poor R wave progression until V4.  Nonspecific ST-T changes.  Echo:  GXT:2019 stress MPI report personally reviewed: normal perfusion and LV function no inducible ischemia.  Holter:  CTA:         Physical Examination  Review of Systems   /72 (BP Location: Left arm, Patient Position: Sitting, Cuff Size: Adult Regular)   Pulse 72   Resp 16   Wt 74.4 kg (164 lb)   BMI 23.53 kg/m    Body mass index is 23.53 kg/m .  Wt Readings from Last 3 Encounters:   23 74.4 kg (164 lb)   23 71.9 kg (158 lb 9.6 oz)   11/10/22 73.7 kg (162 lb 6.4 oz)       General Appearance:    Pleasant middle-age male appears stated age. no acute distress, normal body habitus   ENT/Mouth: Oropharynx normal    EYES:  no scleral icterus, normal conjunctivae. No eyelid xanthelasma   Neck: No cervical lymphadenopathy  Thyroid not enlarged or nodular  No JVD   Respiratory:   lungs clear , no rales or wheezing, Normal chest wall expansion    Cardiovascular:   Regular rhythm, normal rate. Normal 1st and 2nd heart sounds.  No murmurs, no rubs or gallops;   radial pulses are full and equal   Jugular venous pressure is normal   Abdomen/GI:  No tenderness, no organomegaly, no pulsatile masses. NBS.   Extremities: No cyanosis or clubbing.  No peripheral edema   Skin: No rash or lesions   Heme/lymph/ Immunology No lymphadenopathy, petechiae   Neurologic: Alert and oriented. No focal motor weakness, gait appears normal.       Psychiatric: Pleasant, calm, appropriate affect.          No known hepatic, renal, pulmonary, or CNS disorders. The remainder of his complete ROS is negative except as noted above.         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     Asthma      Cardiac arrest (H) 7/16/15     Cardiomyopathy (H)      Cardiomyopathy, ischemic      Congestive heart failure, unspecified      Coronary artery disease      Coronary artery disease      Depressive disorder      High cholesterol      Hyperlipidemia      Keratosis      MI (myocardial infarction) (H) 7/10/15    anterior     BECKI (obstructive sleep apnea)     no CPAP     Prostate cancer (H) 3/30/2022     Uncomplicated asthma      Past Surgical History:   Procedure Laterality Date     CARDIAC CATHETERIZATION       CORONARY STENT PLACEMENT       EP ICD INSERT       HERNIA REPAIR       INSERT / REPLACE / REMOVE PACEMAKER       LAPAROSCOPIC APPENDECTOMY N/A 1/29/2023    Procedure: APPENDECTOMY, LAPAROSCOPIC;  Surgeon: Tung Rodas MD;  Location:  OR      LAPAROSCOPIC HERNIORRHAPHY INGUINAL Bilateral 11/21/2016    Procedure: LAPAROSCOPIC BILATERAL INGUINAL HERNIA REPAIR;  Surgeon: Yung Anderson MD;  Location: [facility];  Service:      OTHER SURGICAL HISTORY  7/10/15    coronary stentsmid lad     OTHER SURGICAL HISTORY  7/22/15    icd dual, [facility]     PICC  7/18/2015          Family History   Problem Relation Age of Onset     Prostate Cancer Father      Diabetes No family hx of      Coronary Artery Disease No family hx of      Parkinsonism Mother      Cancer Father      No Known Problems Sister      No Known Problems Sister         Social History     Socioeconomic History     Marital status:      Spouse name: Not on file     Number of children: Not on file     Years of education: Not on file     Highest education level: Not on file   Occupational History     Not on file   Tobacco Use     Smoking status: Never     Smokeless tobacco: Never   Vaping Use     Vaping Use: Never used   Substance and Sexual Activity     Alcohol use: Yes     Alcohol/week: 7.0 standard drinks     Drug use: No     Sexual activity: Yes     Partners: Female   Other Topics Concern     Not on file   Social History Narrative     Not on file     Social Determinants of Health     Financial Resource Strain: Not on file   Food Insecurity: Not on file   Transportation Needs: Not on file   Physical Activity: Not on file   Stress: Not on file   Social Connections: Not on file   Intimate Partner Violence: Not on file   Housing Stability: Not on file           Medications  Allergies   Current Outpatient Medications   Medication Sig Dispense Refill     albuterol (PROAIR HFA/PROVENTIL HFA/VENTOLIN HFA) 108 (90 Base) MCG/ACT inhaler Inhale 2 puffs into the lungs every 4 hours as needed for shortness of breath / dyspnea or wheezing 18 g 1     ASPIRIN PO Take 81 mg by mouth       atorvastatin (LIPITOR) 40 MG tablet TAKE 1 TABLET (40 MG TOTAL) BY MOUTH DAILY. 90 tablet 1     metoprolol  succinate ER (TOPROL XL) 25 MG 24 hr tablet Take 1 tablet (25 mg) by mouth daily 90 tablet 0     order for DME Equipment being ordered: Home blood pressure monitor 1 Units 0     tadalafil (CIALIS) 20 MG tablet Take 10 mg by mouth twice a week       oxyCODONE (ROXICODONE) 5 MG tablet Take 1-2 tablets (5-10 mg) by mouth every 4 hours as needed for pain (Patient not taking: Reported on 3/13/2023) 12 tablet 0     senna-docusate (SENOKOT-S/PERICOLACE) 8.6-50 MG tablet Take 2 tablets by mouth daily Take while taking oxycodone. Hold for loose stools. (Patient not taking: Reported on 3/13/2023) 20 tablet 0     sildenafil (VIAGRA) 100 MG tablet Take one half to one tablet as needed. (Patient not taking: Reported on 3/13/2023) 20 tablet 11     No Known Allergies       Lab Results    Chemistry/lipid CBC Cardiac Enzymes/BNP/TSH/INR   Recent Labs   Lab Test 11/10/22  0915 11/08/17  0912 10/24/16  1100   CHOL 146   < > 168.4   HDL 75   < > 66.4   LDL 59   < > 87   TRIG 59   < > 73.6   CHOLHDLRATIO  --   --  2.5    < > = values in this interval not displayed.     Recent Labs   Lab Test 11/10/22  0915 09/10/21  1057 02/10/21  1551   LDL 59 52 66     Recent Labs   Lab Test 01/28/23  1807      POTASSIUM 3.9   CHLORIDE 104   CO2 24   *   BUN 13.7   CR 0.90   GFRESTIMATED >90   ALEENA 8.3*     Recent Labs   Lab Test 01/28/23  1807 09/10/21  1057 09/06/19  1535   CR 0.90 0.99 1.1     No results for input(s): A1C in the last 49887 hours.       Recent Labs   Lab Test 01/28/23  1807   WBC 10.8   HGB 14.0   HCT 42.7   MCV 94        Recent Labs   Lab Test 01/28/23 1807 09/06/19  1535   HGB 14.0 13.7    No results for input(s): TROPONINI in the last 43118 hours.  No results for input(s): BNP, NTBNPI, NTBNP in the last 60490 hours.  No results for input(s): TSH in the last 22359 hours.  No results for input(s): INR in the last 48648 hours.     Joseph Cronin MD            Thank you for allowing me to participate in the  care of your patient.      Sincerely,     Joseph Cronin MD     [facility]  cc:   No referring provider defined for this encounter.

## 2023-03-13 NOTE — PROGRESS NOTES
HEART CARE ENCOUNTER NOTE      [facility]  [phone]      Assessment/Recommendations   Assessment:   1.CAD, with history of anterior MI , PCI/stent to LAD. Subsequent out of hospital Vfib arrest POD6 treated with citizen CPR, hypothermia, with complete recovery.   A. S/p ICD implant  B. Normal Stress MPI 2019  C. No angina      Plan:   1.No med changes  2. Regular exercise routine  3. Follow-up 1 year           History of Present Illness/Subjective    HPI: Inocente Rios is a 67 year old male with a history of anterior MI in  treated with primary PCI and stent implant to the LAD.  He was doing well in the 6 postoperative day after he was home he sustained a V-fib out-of-hospital arrest.  He is treated with Citizen CPR and hypothermia.  He has stents that radiate PA angiogram were widely patent.  An ICD was implanted.  He is done well since then has had no recurrent problems.  His last stress MPI was in 2019 which showed normal function and no evidence of inducible ischemia.  He returns today for interrogation of his ICD and routine follow-up.    Studies reviewed:  EC23 personally reviewed: NSR poor R wave progression until V4.  Nonspecific ST-T changes.  Echo:  GXT:2019 stress MPI report personally reviewed: normal perfusion and LV function no inducible ischemia.  Holter:  CTA:         Physical Examination  Review of Systems   /72 (BP Location: Left arm, Patient Position: Sitting, Cuff Size: Adult Regular)   Pulse 72   Resp 16   Wt 74.4 kg (164 lb)   BMI 23.53 kg/m    Body mass index is 23.53 kg/m .  Wt Readings from Last 3 Encounters:   23 74.4 kg (164 lb)   23 71.9 kg (158 lb 9.6 oz)   11/10/22 73.7 kg (162 lb 6.4 oz)       General Appearance:   Pleasant middle-age male appears stated age. no acute distress, normal body habitus   ENT/Mouth: Oropharynx normal    EYES:  no scleral icterus, normal conjunctivae. No eyelid xanthelasma   Neck: No cervical  lymphadenopathy  Thyroid not enlarged or nodular  No JVD   Respiratory:   lungs clear , no rales or wheezing, Normal chest wall expansion    Cardiovascular:   Regular rhythm, normal rate. Normal 1st and 2nd heart sounds.  No murmurs, no rubs or gallops;   radial pulses are full and equal   Jugular venous pressure is normal   Abdomen/GI:  No tenderness, no organomegaly, no pulsatile masses. NBS.   Extremities: No cyanosis or clubbing.  No peripheral edema   Skin: No rash or lesions   Heme/lymph/ Immunology No lymphadenopathy, petechiae   Neurologic: Alert and oriented. No focal motor weakness, gait appears normal.       Psychiatric: Pleasant, calm, appropriate affect.          No known hepatic, renal, pulmonary, or CNS disorders. The remainder of his complete ROS is negative except as noted above.         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     Asthma      Cardiac arrest (H) 7/16/15     Cardiomyopathy (H)      Cardiomyopathy, ischemic      Congestive heart failure, unspecified      Coronary artery disease      Coronary artery disease      Depressive disorder      High cholesterol      Hyperlipidemia      Keratosis      MI (myocardial infarction) (H) 7/10/15    anterior     BECKI (obstructive sleep apnea)     no CPAP     Prostate cancer (H) 3/30/2022     Uncomplicated asthma      Past Surgical History:   Procedure Laterality Date     CARDIAC CATHETERIZATION       CORONARY STENT PLACEMENT       EP ICD INSERT       HERNIA REPAIR       INSERT / REPLACE / REMOVE PACEMAKER       LAPAROSCOPIC APPENDECTOMY N/A 1/29/2023    Procedure: APPENDECTOMY, LAPAROSCOPIC;  Surgeon: Tung Rodas MD;  Location:  OR     LAPAROSCOPIC HERNIORRHAPHY INGUINAL Bilateral 11/21/2016    Procedure: LAPAROSCOPIC BILATERAL INGUINAL HERNIA REPAIR;  Surgeon: Yung Anderson MD;  Location: [facility];  Service:      OTHER  SURGICAL HISTORY  7/10/15    coronary stentsmid lad     OTHER SURGICAL HISTORY  7/22/15    icd dual, [facility]     PICC  7/18/2015          Family History   Problem Relation Age of Onset     Prostate Cancer Father      Diabetes No family hx of      Coronary Artery Disease No family hx of      Parkinsonism Mother      Cancer Father      No Known Problems Sister      No Known Problems Sister         Social History     Socioeconomic History     Marital status:      Spouse name: Not on file     Number of children: Not on file     Years of education: Not on file     Highest education level: Not on file   Occupational History     Not on file   Tobacco Use     Smoking status: Never     Smokeless tobacco: Never   Vaping Use     Vaping Use: Never used   Substance and Sexual Activity     Alcohol use: Yes     Alcohol/week: 7.0 standard drinks     Drug use: No     Sexual activity: Yes     Partners: Female   Other Topics Concern     Not on file   Social History Narrative     Not on file     Social Determinants of Health     Financial Resource Strain: Not on file   Food Insecurity: Not on file   Transportation Needs: Not on file   Physical Activity: Not on file   Stress: Not on file   Social Connections: Not on file   Intimate Partner Violence: Not on file   Housing Stability: Not on file           Medications  Allergies   Current Outpatient Medications   Medication Sig Dispense Refill     albuterol (PROAIR HFA/PROVENTIL HFA/VENTOLIN HFA) 108 (90 Base) MCG/ACT inhaler Inhale 2 puffs into the lungs every 4 hours as needed for shortness of breath / dyspnea or wheezing 18 g 1     ASPIRIN PO Take 81 mg by mouth       atorvastatin (LIPITOR) 40 MG tablet TAKE 1 TABLET (40 MG TOTAL) BY MOUTH DAILY. 90 tablet 1     metoprolol succinate ER (TOPROL XL) 25 MG 24 hr tablet Take 1 tablet (25 mg) by mouth daily 90 tablet 0     order for DME Equipment being ordered: Home blood pressure monitor 1 Units 0     tadalafil (CIALIS) 20 MG  tablet Take 10 mg by mouth twice a week       oxyCODONE (ROXICODONE) 5 MG tablet Take 1-2 tablets (5-10 mg) by mouth every 4 hours as needed for pain (Patient not taking: Reported on 3/13/2023) 12 tablet 0     senna-docusate (SENOKOT-S/PERICOLACE) 8.6-50 MG tablet Take 2 tablets by mouth daily Take while taking oxycodone. Hold for loose stools. (Patient not taking: Reported on 3/13/2023) 20 tablet 0     sildenafil (VIAGRA) 100 MG tablet Take one half to one tablet as needed. (Patient not taking: Reported on 3/13/2023) 20 tablet 11     No Known Allergies       Lab Results    Chemistry/lipid CBC Cardiac Enzymes/BNP/TSH/INR   Recent Labs   Lab Test 11/10/22  0915 11/08/17  0912 10/24/16  1100   CHOL 146   < > 168.4   HDL 75   < > 66.4   LDL 59   < > 87   TRIG 59   < > 73.6   CHOLHDLRATIO  --   --  2.5    < > = values in this interval not displayed.     Recent Labs   Lab Test 11/10/22  0915 09/10/21  1057 02/10/21  1551   LDL 59 52 66     Recent Labs   Lab Test 01/28/23  1807      POTASSIUM 3.9   CHLORIDE 104   CO2 24   *   BUN 13.7   CR 0.90   GFRESTIMATED >90   ALEENA 8.3*     Recent Labs   Lab Test 01/28/23  1807 09/10/21  1057 09/06/19  1535   CR 0.90 0.99 1.1     No results for input(s): A1C in the last 44624 hours.       Recent Labs   Lab Test 01/28/23  1807   WBC 10.8   HGB 14.0   HCT 42.7   MCV 94        Recent Labs   Lab Test 01/28/23 1807 09/06/19  1535   HGB 14.0 13.7    No results for input(s): TROPONINI in the last 89152 hours.  No results for input(s): BNP, NTBNPI, NTBNP in the last 55995 hours.  No results for input(s): TSH in the last 07736 hours.  No results for input(s): INR in the last 33721 hours.     Joseph Cronin MD

## 2023-05-10 DIAGNOSIS — I49.01 VENTRICULAR FIBRILLATION (H): ICD-10-CM

## 2023-05-10 DIAGNOSIS — I25.10 CORONARY ARTERY DISEASE INVOLVING NATIVE CORONARY ARTERY OF NATIVE HEART WITHOUT ANGINA PECTORIS: ICD-10-CM

## 2023-05-11 RX ORDER — METOPROLOL SUCCINATE 25 MG/1
25 TABLET, EXTENDED RELEASE ORAL DAILY
Qty: 90 TABLET | Refills: 3 | Status: SHIPPED | OUTPATIENT
Start: 2023-05-11 | End: 2024-05-29

## 2023-06-13 ENCOUNTER — ANCILLARY PROCEDURE (OUTPATIENT)
Dept: CARDIOLOGY | Facility: CLINIC | Age: 68
End: 2023-06-13
Attending: INTERNAL MEDICINE
Payer: MEDICARE

## 2023-06-13 DIAGNOSIS — Z95.810 ICD (IMPLANTABLE CARDIOVERTER-DEFIBRILLATOR) IN PLACE: ICD-10-CM

## 2023-06-13 DIAGNOSIS — I49.01 VENTRICULAR FIBRILLATION (H): ICD-10-CM

## 2023-06-14 LAB
MDC_IDC_EPISODE_DTM: NORMAL
MDC_IDC_EPISODE_DTM: NORMAL
MDC_IDC_EPISODE_DURATION: 13 S
MDC_IDC_EPISODE_ID: NORMAL
MDC_IDC_EPISODE_ID: NORMAL
MDC_IDC_EPISODE_TYPE: NORMAL
MDC_IDC_EPISODE_TYPE: NORMAL
MDC_IDC_LEAD_IMPLANT_DT: NORMAL
MDC_IDC_LEAD_LOCATION: NORMAL
MDC_IDC_LEAD_LOCATION_DETAIL_1: NORMAL
MDC_IDC_LEAD_MFG: NORMAL
MDC_IDC_LEAD_MODEL: NORMAL
MDC_IDC_LEAD_POLARITY_TYPE: NORMAL
MDC_IDC_LEAD_SERIAL: NORMAL
MDC_IDC_MSMT_BATTERY_DTM: NORMAL
MDC_IDC_MSMT_BATTERY_REMAINING_LONGEVITY: 114 MO
MDC_IDC_MSMT_BATTERY_REMAINING_PERCENTAGE: 100 %
MDC_IDC_MSMT_BATTERY_STATUS: NORMAL
MDC_IDC_MSMT_CAP_CHARGE_DTM: NORMAL
MDC_IDC_MSMT_CAP_CHARGE_DTM: NORMAL
MDC_IDC_MSMT_CAP_CHARGE_ENERGY: 21 J
MDC_IDC_MSMT_CAP_CHARGE_TIME: 10.2 S
MDC_IDC_MSMT_CAP_CHARGE_TIME: 3.7 S
MDC_IDC_MSMT_CAP_CHARGE_TYPE: NORMAL
MDC_IDC_MSMT_CAP_CHARGE_TYPE: NORMAL
MDC_IDC_MSMT_LEADCHNL_RV_IMPEDANCE_VALUE: 815 OHM
MDC_IDC_MSMT_LEADCHNL_RV_PACING_THRESHOLD_AMPLITUDE: 1.1 V
MDC_IDC_MSMT_LEADCHNL_RV_PACING_THRESHOLD_PULSEWIDTH: 0.4 MS
MDC_IDC_PG_IMPLANT_DTM: NORMAL
MDC_IDC_PG_MFG: NORMAL
MDC_IDC_PG_MODEL: NORMAL
MDC_IDC_PG_SERIAL: NORMAL
MDC_IDC_PG_TYPE: NORMAL
MDC_IDC_SESS_CLINIC_NAME: NORMAL
MDC_IDC_SESS_DTM: NORMAL
MDC_IDC_SESS_TYPE: NORMAL
MDC_IDC_SET_BRADY_LOWRATE: 40 {BEATS}/MIN
MDC_IDC_SET_BRADY_MODE: NORMAL
MDC_IDC_SET_LEADCHNL_RV_PACING_AMPLITUDE: 2.3 V
MDC_IDC_SET_LEADCHNL_RV_PACING_POLARITY: NORMAL
MDC_IDC_SET_LEADCHNL_RV_PACING_PULSEWIDTH: 0.4 MS
MDC_IDC_SET_LEADCHNL_RV_SENSING_ADAPTATION_MODE: NORMAL
MDC_IDC_SET_LEADCHNL_RV_SENSING_POLARITY: NORMAL
MDC_IDC_SET_LEADCHNL_RV_SENSING_SENSITIVITY: 0.6 MV
MDC_IDC_SET_ZONE_DETECTION_INTERVAL: 250 MS
MDC_IDC_SET_ZONE_DETECTION_INTERVAL: 324 MS
MDC_IDC_SET_ZONE_TYPE: NORMAL
MDC_IDC_SET_ZONE_TYPE: NORMAL
MDC_IDC_SET_ZONE_VENDOR_TYPE: NORMAL
MDC_IDC_SET_ZONE_VENDOR_TYPE: NORMAL
MDC_IDC_STAT_BRADY_DTM_END: NORMAL
MDC_IDC_STAT_BRADY_DTM_START: NORMAL
MDC_IDC_STAT_BRADY_RV_PERCENT_PACED: 0 %
MDC_IDC_STAT_EPISODE_RECENT_COUNT: 0
MDC_IDC_STAT_EPISODE_RECENT_COUNT: 1
MDC_IDC_STAT_EPISODE_RECENT_COUNT_DTM_END: NORMAL
MDC_IDC_STAT_EPISODE_RECENT_COUNT_DTM_START: NORMAL
MDC_IDC_STAT_EPISODE_TYPE: NORMAL
MDC_IDC_STAT_EPISODE_VENDOR_TYPE: NORMAL
MDC_IDC_STAT_TACHYTHERAPY_ATP_DELIVERED_RECENT: 0
MDC_IDC_STAT_TACHYTHERAPY_ATP_DELIVERED_TOTAL: 0
MDC_IDC_STAT_TACHYTHERAPY_RECENT_DTM_END: NORMAL
MDC_IDC_STAT_TACHYTHERAPY_RECENT_DTM_START: NORMAL
MDC_IDC_STAT_TACHYTHERAPY_SHOCKS_ABORTED_RECENT: 0
MDC_IDC_STAT_TACHYTHERAPY_SHOCKS_ABORTED_TOTAL: 0
MDC_IDC_STAT_TACHYTHERAPY_SHOCKS_DELIVERED_RECENT: 0
MDC_IDC_STAT_TACHYTHERAPY_SHOCKS_DELIVERED_TOTAL: 1
MDC_IDC_STAT_TACHYTHERAPY_TOTAL_DTM_END: NORMAL
MDC_IDC_STAT_TACHYTHERAPY_TOTAL_DTM_START: NORMAL

## 2023-06-14 PROCEDURE — 93296 REM INTERROG EVL PM/IDS: CPT | Performed by: INTERNAL MEDICINE

## 2023-06-14 PROCEDURE — 93295 DEV INTERROG REMOTE 1/2/MLT: CPT | Performed by: INTERNAL MEDICINE

## 2023-09-06 ENCOUNTER — TRANSFERRED RECORDS (OUTPATIENT)
Dept: HEALTH INFORMATION MANAGEMENT | Facility: CLINIC | Age: 68
End: 2023-09-06

## 2023-09-17 ENCOUNTER — HEALTH MAINTENANCE LETTER (OUTPATIENT)
Age: 68
End: 2023-09-17

## 2023-09-20 ENCOUNTER — ANCILLARY PROCEDURE (OUTPATIENT)
Dept: CARDIOLOGY | Facility: CLINIC | Age: 68
End: 2023-09-20
Attending: INTERNAL MEDICINE
Payer: MEDICARE

## 2023-09-20 DIAGNOSIS — I49.01 VF (VENTRICULAR FIBRILLATION) (H): ICD-10-CM

## 2023-09-20 DIAGNOSIS — Z95.810 ICD (IMPLANTABLE CARDIOVERTER-DEFIBRILLATOR) IN PLACE: ICD-10-CM

## 2023-09-20 LAB
MDC_IDC_EPISODE_DTM: NORMAL
MDC_IDC_EPISODE_ID: NORMAL
MDC_IDC_EPISODE_TYPE: NORMAL
MDC_IDC_LEAD_IMPLANT_DT: NORMAL
MDC_IDC_LEAD_LOCATION: NORMAL
MDC_IDC_LEAD_LOCATION_DETAIL_1: NORMAL
MDC_IDC_LEAD_MFG: NORMAL
MDC_IDC_LEAD_MODEL: NORMAL
MDC_IDC_LEAD_POLARITY_TYPE: NORMAL
MDC_IDC_LEAD_SERIAL: NORMAL
MDC_IDC_MSMT_BATTERY_DTM: NORMAL
MDC_IDC_MSMT_BATTERY_REMAINING_LONGEVITY: 108 MO
MDC_IDC_MSMT_BATTERY_REMAINING_PERCENTAGE: 97 %
MDC_IDC_MSMT_BATTERY_STATUS: NORMAL
MDC_IDC_MSMT_CAP_CHARGE_DTM: NORMAL
MDC_IDC_MSMT_CAP_CHARGE_DTM: NORMAL
MDC_IDC_MSMT_CAP_CHARGE_ENERGY: 21 J
MDC_IDC_MSMT_CAP_CHARGE_TIME: 10.2 S
MDC_IDC_MSMT_CAP_CHARGE_TIME: 3.7 S
MDC_IDC_MSMT_CAP_CHARGE_TYPE: NORMAL
MDC_IDC_MSMT_CAP_CHARGE_TYPE: NORMAL
MDC_IDC_MSMT_LEADCHNL_RV_IMPEDANCE_VALUE: 797 OHM
MDC_IDC_MSMT_LEADCHNL_RV_PACING_THRESHOLD_AMPLITUDE: 1.1 V
MDC_IDC_MSMT_LEADCHNL_RV_PACING_THRESHOLD_PULSEWIDTH: 0.4 MS
MDC_IDC_PG_IMPLANT_DTM: NORMAL
MDC_IDC_PG_MFG: NORMAL
MDC_IDC_PG_MODEL: NORMAL
MDC_IDC_PG_SERIAL: NORMAL
MDC_IDC_PG_TYPE: NORMAL
MDC_IDC_SESS_CLINIC_NAME: NORMAL
MDC_IDC_SESS_DTM: NORMAL
MDC_IDC_SESS_TYPE: NORMAL
MDC_IDC_SET_BRADY_LOWRATE: 40 {BEATS}/MIN
MDC_IDC_SET_BRADY_MODE: NORMAL
MDC_IDC_SET_LEADCHNL_RV_PACING_AMPLITUDE: 2.3 V
MDC_IDC_SET_LEADCHNL_RV_PACING_POLARITY: NORMAL
MDC_IDC_SET_LEADCHNL_RV_PACING_PULSEWIDTH: 0.4 MS
MDC_IDC_SET_LEADCHNL_RV_SENSING_ADAPTATION_MODE: NORMAL
MDC_IDC_SET_LEADCHNL_RV_SENSING_POLARITY: NORMAL
MDC_IDC_SET_LEADCHNL_RV_SENSING_SENSITIVITY: 0.6 MV
MDC_IDC_SET_ZONE_DETECTION_INTERVAL: 250 MS
MDC_IDC_SET_ZONE_DETECTION_INTERVAL: 324 MS
MDC_IDC_SET_ZONE_TYPE: NORMAL
MDC_IDC_SET_ZONE_TYPE: NORMAL
MDC_IDC_SET_ZONE_VENDOR_TYPE: NORMAL
MDC_IDC_SET_ZONE_VENDOR_TYPE: NORMAL
MDC_IDC_STAT_BRADY_DTM_END: NORMAL
MDC_IDC_STAT_BRADY_DTM_START: NORMAL
MDC_IDC_STAT_BRADY_RV_PERCENT_PACED: 0 %
MDC_IDC_STAT_EPISODE_RECENT_COUNT: 0
MDC_IDC_STAT_EPISODE_RECENT_COUNT_DTM_END: NORMAL
MDC_IDC_STAT_EPISODE_RECENT_COUNT_DTM_START: NORMAL
MDC_IDC_STAT_EPISODE_TYPE: NORMAL
MDC_IDC_STAT_EPISODE_VENDOR_TYPE: NORMAL
MDC_IDC_STAT_TACHYTHERAPY_ATP_DELIVERED_RECENT: 0
MDC_IDC_STAT_TACHYTHERAPY_ATP_DELIVERED_TOTAL: 0
MDC_IDC_STAT_TACHYTHERAPY_RECENT_DTM_END: NORMAL
MDC_IDC_STAT_TACHYTHERAPY_RECENT_DTM_START: NORMAL
MDC_IDC_STAT_TACHYTHERAPY_SHOCKS_ABORTED_RECENT: 0
MDC_IDC_STAT_TACHYTHERAPY_SHOCKS_ABORTED_TOTAL: 0
MDC_IDC_STAT_TACHYTHERAPY_SHOCKS_DELIVERED_RECENT: 0
MDC_IDC_STAT_TACHYTHERAPY_SHOCKS_DELIVERED_TOTAL: 1
MDC_IDC_STAT_TACHYTHERAPY_TOTAL_DTM_END: NORMAL
MDC_IDC_STAT_TACHYTHERAPY_TOTAL_DTM_START: NORMAL

## 2023-09-20 PROCEDURE — 93295 DEV INTERROG REMOTE 1/2/MLT: CPT | Performed by: INTERNAL MEDICINE

## 2023-09-20 PROCEDURE — 93296 REM INTERROG EVL PM/IDS: CPT | Performed by: INTERNAL MEDICINE

## 2023-10-30 ENCOUNTER — TRANSFERRED RECORDS (OUTPATIENT)
Dept: HEALTH INFORMATION MANAGEMENT | Facility: CLINIC | Age: 68
End: 2023-10-30

## 2023-11-24 DIAGNOSIS — I25.10 CORONARY ARTERY DISEASE INVOLVING NATIVE HEART WITHOUT ANGINA PECTORIS, UNSPECIFIED VESSEL OR LESION TYPE: ICD-10-CM

## 2023-11-24 RX ORDER — ATORVASTATIN CALCIUM 40 MG/1
TABLET, FILM COATED ORAL
Qty: 90 TABLET | Refills: 0 | Status: SHIPPED | OUTPATIENT
Start: 2023-11-24

## 2023-12-14 ENCOUNTER — TRANSFERRED RECORDS (OUTPATIENT)
Dept: HEALTH INFORMATION MANAGEMENT | Facility: CLINIC | Age: 68
End: 2023-12-14

## 2023-12-26 ENCOUNTER — ANCILLARY PROCEDURE (OUTPATIENT)
Dept: CARDIOLOGY | Facility: CLINIC | Age: 68
End: 2023-12-26
Attending: INTERNAL MEDICINE
Payer: MEDICARE

## 2023-12-26 DIAGNOSIS — I49.01 VF (VENTRICULAR FIBRILLATION) (H): ICD-10-CM

## 2023-12-26 DIAGNOSIS — Z95.810 ICD (IMPLANTABLE CARDIOVERTER-DEFIBRILLATOR) IN PLACE: ICD-10-CM

## 2024-02-02 LAB
MDC_IDC_LEAD_CONNECTION_STATUS: NORMAL
MDC_IDC_LEAD_IMPLANT_DT: NORMAL
MDC_IDC_LEAD_LOCATION: NORMAL
MDC_IDC_LEAD_LOCATION_DETAIL_1: NORMAL
MDC_IDC_LEAD_MFG: NORMAL
MDC_IDC_LEAD_MODEL: NORMAL
MDC_IDC_LEAD_POLARITY_TYPE: NORMAL
MDC_IDC_LEAD_SERIAL: NORMAL
MDC_IDC_MSMT_BATTERY_DTM: NORMAL
MDC_IDC_MSMT_BATTERY_REMAINING_LONGEVITY: 108 MO
MDC_IDC_MSMT_BATTERY_REMAINING_PERCENTAGE: 95 %
MDC_IDC_MSMT_BATTERY_STATUS: NORMAL
MDC_IDC_MSMT_CAP_CHARGE_DTM: NORMAL
MDC_IDC_MSMT_CAP_CHARGE_DTM: NORMAL
MDC_IDC_MSMT_CAP_CHARGE_ENERGY: 21 J
MDC_IDC_MSMT_CAP_CHARGE_TIME: 10.2 S
MDC_IDC_MSMT_CAP_CHARGE_TIME: 3.7 S
MDC_IDC_MSMT_CAP_CHARGE_TYPE: NORMAL
MDC_IDC_MSMT_CAP_CHARGE_TYPE: NORMAL
MDC_IDC_MSMT_LEADCHNL_RV_IMPEDANCE_VALUE: 784 OHM
MDC_IDC_MSMT_LEADCHNL_RV_PACING_THRESHOLD_AMPLITUDE: 1.1 V
MDC_IDC_MSMT_LEADCHNL_RV_PACING_THRESHOLD_PULSEWIDTH: 0.4 MS
MDC_IDC_PG_IMPLANT_DTM: NORMAL
MDC_IDC_PG_MFG: NORMAL
MDC_IDC_PG_MODEL: NORMAL
MDC_IDC_PG_SERIAL: NORMAL
MDC_IDC_PG_TYPE: NORMAL
MDC_IDC_SESS_CLINIC_NAME: NORMAL
MDC_IDC_SESS_DTM: NORMAL
MDC_IDC_SESS_TYPE: NORMAL
MDC_IDC_SET_BRADY_LOWRATE: 40 {BEATS}/MIN
MDC_IDC_SET_BRADY_MODE: NORMAL
MDC_IDC_SET_LEADCHNL_RV_PACING_AMPLITUDE: 2.3 V
MDC_IDC_SET_LEADCHNL_RV_PACING_POLARITY: NORMAL
MDC_IDC_SET_LEADCHNL_RV_PACING_PULSEWIDTH: 0.4 MS
MDC_IDC_SET_LEADCHNL_RV_SENSING_ADAPTATION_MODE: NORMAL
MDC_IDC_SET_LEADCHNL_RV_SENSING_POLARITY: NORMAL
MDC_IDC_SET_LEADCHNL_RV_SENSING_SENSITIVITY: 0.6 MV
MDC_IDC_SET_ZONE_DETECTION_INTERVAL: 250 MS
MDC_IDC_SET_ZONE_DETECTION_INTERVAL: 324 MS
MDC_IDC_SET_ZONE_STATUS: NORMAL
MDC_IDC_SET_ZONE_STATUS: NORMAL
MDC_IDC_SET_ZONE_TYPE: NORMAL
MDC_IDC_SET_ZONE_TYPE: NORMAL
MDC_IDC_SET_ZONE_VENDOR_TYPE: NORMAL
MDC_IDC_SET_ZONE_VENDOR_TYPE: NORMAL
MDC_IDC_STAT_BRADY_DTM_END: NORMAL
MDC_IDC_STAT_BRADY_DTM_START: NORMAL
MDC_IDC_STAT_BRADY_RV_PERCENT_PACED: 0 %
MDC_IDC_STAT_EPISODE_RECENT_COUNT: 0
MDC_IDC_STAT_EPISODE_RECENT_COUNT_DTM_END: NORMAL
MDC_IDC_STAT_EPISODE_RECENT_COUNT_DTM_START: NORMAL
MDC_IDC_STAT_EPISODE_TYPE: NORMAL
MDC_IDC_STAT_EPISODE_VENDOR_TYPE: NORMAL
MDC_IDC_STAT_TACHYTHERAPY_ATP_DELIVERED_RECENT: 0
MDC_IDC_STAT_TACHYTHERAPY_ATP_DELIVERED_TOTAL: 0
MDC_IDC_STAT_TACHYTHERAPY_RECENT_DTM_END: NORMAL
MDC_IDC_STAT_TACHYTHERAPY_RECENT_DTM_START: NORMAL
MDC_IDC_STAT_TACHYTHERAPY_SHOCKS_ABORTED_RECENT: 0
MDC_IDC_STAT_TACHYTHERAPY_SHOCKS_ABORTED_TOTAL: 0
MDC_IDC_STAT_TACHYTHERAPY_SHOCKS_DELIVERED_RECENT: 0
MDC_IDC_STAT_TACHYTHERAPY_SHOCKS_DELIVERED_TOTAL: 1
MDC_IDC_STAT_TACHYTHERAPY_TOTAL_DTM_END: NORMAL
MDC_IDC_STAT_TACHYTHERAPY_TOTAL_DTM_START: NORMAL

## 2024-02-02 PROCEDURE — 93295 DEV INTERROG REMOTE 1/2/MLT: CPT | Performed by: INTERNAL MEDICINE

## 2024-02-02 PROCEDURE — 93296 REM INTERROG EVL PM/IDS: CPT | Performed by: INTERNAL MEDICINE

## 2024-03-21 DIAGNOSIS — I49.01 VF (VENTRICULAR FIBRILLATION) (H): ICD-10-CM

## 2024-03-21 DIAGNOSIS — Z95.810 ICD (IMPLANTABLE CARDIOVERTER-DEFIBRILLATOR) IN PLACE: Primary | ICD-10-CM

## 2024-04-01 ENCOUNTER — ANCILLARY PROCEDURE (OUTPATIENT)
Dept: CARDIOLOGY | Facility: CLINIC | Age: 69
End: 2024-04-01
Attending: INTERNAL MEDICINE
Payer: MEDICARE

## 2024-04-01 DIAGNOSIS — Z95.810 ICD (IMPLANTABLE CARDIOVERTER-DEFIBRILLATOR) IN PLACE: Primary | ICD-10-CM

## 2024-04-01 DIAGNOSIS — I49.01 VF (VENTRICULAR FIBRILLATION) (H): ICD-10-CM

## 2024-04-01 LAB
MDC_IDC_EPISODE_DTM: NORMAL
MDC_IDC_EPISODE_ID: NORMAL
MDC_IDC_EPISODE_TYPE: NORMAL
MDC_IDC_LEAD_CONNECTION_STATUS: NORMAL
MDC_IDC_LEAD_IMPLANT_DT: NORMAL
MDC_IDC_LEAD_LOCATION: NORMAL
MDC_IDC_LEAD_LOCATION_DETAIL_1: NORMAL
MDC_IDC_LEAD_MFG: NORMAL
MDC_IDC_LEAD_MODEL: NORMAL
MDC_IDC_LEAD_POLARITY_TYPE: NORMAL
MDC_IDC_LEAD_SERIAL: NORMAL
MDC_IDC_MSMT_BATTERY_DTM: NORMAL
MDC_IDC_MSMT_BATTERY_REMAINING_LONGEVITY: 102 MO
MDC_IDC_MSMT_BATTERY_REMAINING_PERCENTAGE: 92 %
MDC_IDC_MSMT_BATTERY_STATUS: NORMAL
MDC_IDC_MSMT_CAP_CHARGE_DTM: NORMAL
MDC_IDC_MSMT_CAP_CHARGE_DTM: NORMAL
MDC_IDC_MSMT_CAP_CHARGE_ENERGY: 21 J
MDC_IDC_MSMT_CAP_CHARGE_TIME: 10.3 S
MDC_IDC_MSMT_CAP_CHARGE_TIME: 3.7 S
MDC_IDC_MSMT_CAP_CHARGE_TYPE: NORMAL
MDC_IDC_MSMT_CAP_CHARGE_TYPE: NORMAL
MDC_IDC_MSMT_LEADCHNL_RV_IMPEDANCE_VALUE: 767 OHM
MDC_IDC_MSMT_LEADCHNL_RV_PACING_THRESHOLD_AMPLITUDE: 1.5 V
MDC_IDC_MSMT_LEADCHNL_RV_PACING_THRESHOLD_PULSEWIDTH: 0.4 MS
MDC_IDC_PG_IMPLANT_DTM: NORMAL
MDC_IDC_PG_MFG: NORMAL
MDC_IDC_PG_MODEL: NORMAL
MDC_IDC_PG_SERIAL: NORMAL
MDC_IDC_PG_TYPE: NORMAL
MDC_IDC_SESS_CLINIC_NAME: NORMAL
MDC_IDC_SESS_DTM: NORMAL
MDC_IDC_SESS_TYPE: NORMAL
MDC_IDC_SET_BRADY_LOWRATE: 40 {BEATS}/MIN
MDC_IDC_SET_BRADY_MODE: NORMAL
MDC_IDC_SET_LEADCHNL_RV_PACING_AMPLITUDE: 2.3 V
MDC_IDC_SET_LEADCHNL_RV_PACING_POLARITY: NORMAL
MDC_IDC_SET_LEADCHNL_RV_PACING_PULSEWIDTH: 0.4 MS
MDC_IDC_SET_LEADCHNL_RV_SENSING_ADAPTATION_MODE: NORMAL
MDC_IDC_SET_LEADCHNL_RV_SENSING_POLARITY: NORMAL
MDC_IDC_SET_LEADCHNL_RV_SENSING_SENSITIVITY: 0.6 MV
MDC_IDC_SET_ZONE_DETECTION_INTERVAL: 250 MS
MDC_IDC_SET_ZONE_DETECTION_INTERVAL: 324 MS
MDC_IDC_SET_ZONE_STATUS: NORMAL
MDC_IDC_SET_ZONE_STATUS: NORMAL
MDC_IDC_SET_ZONE_TYPE: NORMAL
MDC_IDC_SET_ZONE_TYPE: NORMAL
MDC_IDC_SET_ZONE_VENDOR_TYPE: NORMAL
MDC_IDC_SET_ZONE_VENDOR_TYPE: NORMAL
MDC_IDC_STAT_BRADY_DTM_END: NORMAL
MDC_IDC_STAT_BRADY_DTM_START: NORMAL
MDC_IDC_STAT_BRADY_RV_PERCENT_PACED: 0 %
MDC_IDC_STAT_EPISODE_RECENT_COUNT: 0
MDC_IDC_STAT_EPISODE_RECENT_COUNT: 2
MDC_IDC_STAT_EPISODE_RECENT_COUNT_DTM_END: NORMAL
MDC_IDC_STAT_EPISODE_RECENT_COUNT_DTM_START: NORMAL
MDC_IDC_STAT_EPISODE_TYPE: NORMAL
MDC_IDC_STAT_EPISODE_VENDOR_TYPE: NORMAL
MDC_IDC_STAT_TACHYTHERAPY_ATP_DELIVERED_RECENT: 0
MDC_IDC_STAT_TACHYTHERAPY_ATP_DELIVERED_TOTAL: 0
MDC_IDC_STAT_TACHYTHERAPY_RECENT_DTM_END: NORMAL
MDC_IDC_STAT_TACHYTHERAPY_RECENT_DTM_START: NORMAL
MDC_IDC_STAT_TACHYTHERAPY_SHOCKS_ABORTED_RECENT: 0
MDC_IDC_STAT_TACHYTHERAPY_SHOCKS_ABORTED_TOTAL: 0
MDC_IDC_STAT_TACHYTHERAPY_SHOCKS_DELIVERED_RECENT: 0
MDC_IDC_STAT_TACHYTHERAPY_SHOCKS_DELIVERED_TOTAL: 1
MDC_IDC_STAT_TACHYTHERAPY_TOTAL_DTM_END: NORMAL
MDC_IDC_STAT_TACHYTHERAPY_TOTAL_DTM_START: NORMAL

## 2024-04-01 PROCEDURE — 93282 PRGRMG EVAL IMPLANTABLE DFB: CPT | Performed by: INTERNAL MEDICINE

## 2024-04-17 ENCOUNTER — LAB REQUISITION (OUTPATIENT)
Dept: LAB | Facility: CLINIC | Age: 69
End: 2024-04-17
Payer: MEDICARE

## 2024-04-17 DIAGNOSIS — Z13.29 ENCOUNTER FOR SCREENING FOR OTHER SUSPECTED ENDOCRINE DISORDER: ICD-10-CM

## 2024-04-17 DIAGNOSIS — I25.10 ATHEROSCLEROTIC HEART DISEASE OF NATIVE CORONARY ARTERY WITHOUT ANGINA PECTORIS: ICD-10-CM

## 2024-04-17 DIAGNOSIS — Z13.1 ENCOUNTER FOR SCREENING FOR DIABETES MELLITUS: ICD-10-CM

## 2024-04-17 DIAGNOSIS — N52.31 ERECTILE DYSFUNCTION FOLLOWING RADICAL PROSTATECTOMY: ICD-10-CM

## 2024-04-17 PROCEDURE — 84439 ASSAY OF FREE THYROXINE: CPT | Mod: ORL | Performed by: FAMILY MEDICINE

## 2024-04-17 PROCEDURE — 84403 ASSAY OF TOTAL TESTOSTERONE: CPT | Mod: ORL | Performed by: FAMILY MEDICINE

## 2024-04-17 PROCEDURE — 83525 ASSAY OF INSULIN: CPT | Mod: ORL | Performed by: FAMILY MEDICINE

## 2024-04-17 PROCEDURE — 84443 ASSAY THYROID STIM HORMONE: CPT | Mod: ORL | Performed by: FAMILY MEDICINE

## 2024-04-17 PROCEDURE — 82306 VITAMIN D 25 HYDROXY: CPT | Mod: ORL | Performed by: FAMILY MEDICINE

## 2024-04-17 PROCEDURE — 80053 COMPREHEN METABOLIC PANEL: CPT | Mod: ORL | Performed by: FAMILY MEDICINE

## 2024-04-17 PROCEDURE — 84270 ASSAY OF SEX HORMONE GLOBUL: CPT | Mod: ORL | Performed by: FAMILY MEDICINE

## 2024-04-17 PROCEDURE — 80061 LIPID PANEL: CPT | Mod: ORL | Performed by: FAMILY MEDICINE

## 2024-04-18 LAB
ALBUMIN SERPL BCG-MCNC: 4.4 G/DL (ref 3.5–5.2)
ALP SERPL-CCNC: 73 U/L (ref 40–150)
ALT SERPL W P-5'-P-CCNC: 22 U/L (ref 0–70)
ANION GAP SERPL CALCULATED.3IONS-SCNC: 10 MMOL/L (ref 7–15)
AST SERPL W P-5'-P-CCNC: 30 U/L (ref 0–45)
BILIRUB SERPL-MCNC: 0.7 MG/DL
BUN SERPL-MCNC: 12.8 MG/DL (ref 8–23)
CALCIUM SERPL-MCNC: 9 MG/DL (ref 8.8–10.2)
CHLORIDE SERPL-SCNC: 102 MMOL/L (ref 98–107)
CHOLEST SERPL-MCNC: 142 MG/DL
CREAT SERPL-MCNC: 1.13 MG/DL (ref 0.67–1.17)
DEPRECATED HCO3 PLAS-SCNC: 25 MMOL/L (ref 22–29)
EGFRCR SERPLBLD CKD-EPI 2021: 71 ML/MIN/1.73M2
FASTING STATUS PATIENT QL REPORTED: NORMAL
GLUCOSE SERPL-MCNC: 102 MG/DL (ref 70–99)
HDLC SERPL-MCNC: 68 MG/DL
INSULIN SERPL-ACNC: 6.2 UU/ML (ref 2.6–24.9)
LDLC SERPL CALC-MCNC: 63 MG/DL
NONHDLC SERPL-MCNC: 74 MG/DL
POTASSIUM SERPL-SCNC: 4 MMOL/L (ref 3.4–5.3)
PROT SERPL-MCNC: 6.5 G/DL (ref 6.4–8.3)
SHBG SERPL-SCNC: 49 NMOL/L (ref 11–80)
SODIUM SERPL-SCNC: 137 MMOL/L (ref 135–145)
T4 FREE SERPL-MCNC: 1.17 NG/DL (ref 0.9–1.7)
TRIGL SERPL-MCNC: 56 MG/DL
TSH SERPL DL<=0.005 MIU/L-ACNC: 6.65 UIU/ML (ref 0.3–4.2)
VIT D+METAB SERPL-MCNC: 23 NG/ML (ref 20–50)

## 2024-04-20 LAB
TESTOST FREE SERPL-MCNC: 5.16 NG/DL
TESTOST SERPL-MCNC: 335 NG/DL (ref 240–950)

## 2024-04-25 ENCOUNTER — LAB REQUISITION (OUTPATIENT)
Dept: LAB | Facility: CLINIC | Age: 69
End: 2024-04-25
Payer: MEDICARE

## 2024-04-25 ENCOUNTER — TRANSFERRED RECORDS (OUTPATIENT)
Dept: HEALTH INFORMATION MANAGEMENT | Facility: CLINIC | Age: 69
End: 2024-04-25

## 2024-04-25 DIAGNOSIS — R10.11 RIGHT UPPER QUADRANT PAIN: ICD-10-CM

## 2024-04-25 DIAGNOSIS — C61 MALIGNANT NEOPLASM OF PROSTATE (H): ICD-10-CM

## 2024-04-25 PROCEDURE — 80053 COMPREHEN METABOLIC PANEL: CPT | Mod: ORL | Performed by: FAMILY MEDICINE

## 2024-04-25 PROCEDURE — 84153 ASSAY OF PSA TOTAL: CPT | Mod: ORL | Performed by: FAMILY MEDICINE

## 2024-04-25 PROCEDURE — 86140 C-REACTIVE PROTEIN: CPT | Mod: ORL | Performed by: FAMILY MEDICINE

## 2024-04-25 PROCEDURE — 83690 ASSAY OF LIPASE: CPT | Mod: ORL | Performed by: FAMILY MEDICINE

## 2024-04-26 LAB
ALBUMIN SERPL BCG-MCNC: 4 G/DL (ref 3.5–5.2)
ALP SERPL-CCNC: 65 U/L (ref 40–150)
ALT SERPL W P-5'-P-CCNC: 23 U/L (ref 0–70)
ANION GAP SERPL CALCULATED.3IONS-SCNC: 11 MMOL/L (ref 7–15)
AST SERPL W P-5'-P-CCNC: 30 U/L (ref 0–45)
BILIRUB SERPL-MCNC: 0.2 MG/DL
BUN SERPL-MCNC: 11.8 MG/DL (ref 8–23)
CALCIUM SERPL-MCNC: 8.6 MG/DL (ref 8.8–10.2)
CHLORIDE SERPL-SCNC: 104 MMOL/L (ref 98–107)
CREAT SERPL-MCNC: 1.03 MG/DL (ref 0.67–1.17)
CRP SERPL-MCNC: 4.34 MG/L
DEPRECATED HCO3 PLAS-SCNC: 24 MMOL/L (ref 22–29)
EGFRCR SERPLBLD CKD-EPI 2021: 79 ML/MIN/1.73M2
GLUCOSE SERPL-MCNC: 82 MG/DL (ref 70–99)
LIPASE SERPL-CCNC: 43 U/L (ref 13–60)
POTASSIUM SERPL-SCNC: 4.1 MMOL/L (ref 3.4–5.3)
PROT SERPL-MCNC: 6.1 G/DL (ref 6.4–8.3)
PSA SERPL DL<=0.01 NG/ML-MCNC: 0.54 NG/ML (ref 0–4.5)
SODIUM SERPL-SCNC: 139 MMOL/L (ref 135–145)

## 2024-05-24 ENCOUNTER — TRANSFERRED RECORDS (OUTPATIENT)
Dept: HEALTH INFORMATION MANAGEMENT | Facility: CLINIC | Age: 69
End: 2024-05-24

## 2024-05-28 ENCOUNTER — TELEPHONE (OUTPATIENT)
Dept: CARDIOLOGY | Facility: CLINIC | Age: 69
End: 2024-05-28
Payer: MEDICARE

## 2024-05-28 DIAGNOSIS — I25.10 CORONARY ARTERY DISEASE INVOLVING NATIVE CORONARY ARTERY OF NATIVE HEART WITHOUT ANGINA PECTORIS: ICD-10-CM

## 2024-05-28 DIAGNOSIS — I49.01 VENTRICULAR FIBRILLATION (H): ICD-10-CM

## 2024-05-28 NOTE — TELEPHONE ENCOUNTER
[facility] Call Center    Phone Message    May a detailed message be left on voicemail: yes     Reason for Call: Medication Refill Request    Has the patient contacted the pharmacy for the refill? Yes   Name of medication being requested: metoprolol succinate ER (TOPROL XL) 25 MG 24 hr tablet [[id number]] (Order [id number]   Provider who prescribed the medication: Roseanne  Pharmacy:      [facility] #[id number] - [place], [place] - [address]     Date medication is needed: pt is out come this Friday. Pharmacy stated provider has to reapprove this order.        Action Taken: Other: cardiology     Travel Screening: Not Applicable                                                              Thank you!  [facility]

## 2024-05-29 RX ORDER — METOPROLOL SUCCINATE 25 MG/1
25 TABLET, EXTENDED RELEASE ORAL DAILY
Qty: 90 TABLET | Refills: 0 | Status: SHIPPED | OUTPATIENT
Start: 2024-05-29 | End: 2024-08-28

## 2024-07-29 ENCOUNTER — OFFICE VISIT (OUTPATIENT)
Dept: CARDIOLOGY | Facility: CLINIC | Age: 69
End: 2024-07-29
Payer: MEDICARE

## 2024-07-29 VITALS
RESPIRATION RATE: 14 BRPM | WEIGHT: 160 LBS | BODY MASS INDEX: 22.96 KG/M2 | SYSTOLIC BLOOD PRESSURE: 117 MMHG | DIASTOLIC BLOOD PRESSURE: 76 MMHG | HEART RATE: 80 BPM

## 2024-07-29 DIAGNOSIS — I25.10 CORONARY ARTERY DISEASE INVOLVING NATIVE CORONARY ARTERY OF NATIVE HEART WITHOUT ANGINA PECTORIS: Primary | ICD-10-CM

## 2024-07-29 DIAGNOSIS — I49.01 VF (VENTRICULAR FIBRILLATION) (H): ICD-10-CM

## 2024-07-29 DIAGNOSIS — Z95.810 ICD (IMPLANTABLE CARDIOVERTER-DEFIBRILLATOR) IN PLACE: ICD-10-CM

## 2024-07-29 PROCEDURE — 99214 OFFICE O/P EST MOD 30 MIN: CPT | Performed by: INTERNAL MEDICINE

## 2024-07-29 NOTE — PROGRESS NOTES
HEART CARE ENCOUNTER NOTE      [facility]  [phone]      Assessment/Recommendations   Assessment:   1.CAD, with history of anterior MI 2015, PCI/stent to LAD. Subsequent out of hospital Vfib arrest POD6 treated with citizen CPR, hypothermia, with complete recovery.   A. S/p ICD implant  B. Normal Stress MPI 2019  C. No angina         Plan:   1.  Will decrease metoprolol to 12.5 mg/day to see if this improves symptoms of erectile dysfunction.  I prefer to keep him on at least a low-dose of beta-blocker given his history of previous V-fib arrest.  If there is an increase in ectopy on his device interrogation may need to increase the dose back to baseline 25 mg/day.  2.  Follow-up 1 year           History of Present Illness/Subjective    HPI:  Inocente Rios is a 67 year old male with a history of anterior MI in 2015 treated with primary PCI and stent implant to the LAD.  He was doing well in the 6 postoperative day after he was home he sustained a V-fib out-of-hospital arrest.  He was treated with Citizen CPR and hypothermia.  His stents were widely patent.  An ICD was implanted.  He is done well since then has had no recurrent problems.  His last stress MPI was in 2019 which showed normal function and no evidence of inducible ischemia.  He returns today for routine follow-up.  He feels well.  He denies any new symptoms.  Specifically he has no chest pain lightheadedness palpitations or decrease in exercise tolerance.  He has had some persisting problems with erectile dysfunction despite using both Cialis and Viagra.  He had a complete prostatectomy in the past.  Other stressors include recent separation from his wife of 39 years.  He actually feels that he has less stress now that he is left the marriage.         Physical Examination  Review of Systems   /76 (BP Location: Right arm, Patient Position: Sitting, Cuff Size: Adult Regular)   Pulse 80   Resp 14   Wt 72.6 kg (160 lb)    BMI 22.96 kg/m    Body mass index is 22.96 kg/m .  Wt Readings from Last 3 Encounters:   07/29/24 72.6 kg (160 lb)   03/13/23 74.4 kg (164 lb)   01/29/23 71.9 kg (158 lb 9.6 oz)       General Appearance:   Pleasant middle-age male appears stated age. no acute distress, normal, nonobese body habitus   ENT/Mouth: Oropharynx normal    EYES:  no scleral icterus, normal conjunctivae. No eyelid xanthelasma   Neck: No cervical lymphadenopathy  Thyroid not enlarged or nodular  No JVD   Respiratory:   lungs clear , no rales or wheezing, Normal chest wall expansion    Cardiovascular:   Regular rhythm, normal rate. Normal 1st and 2nd heart sounds.  No murmurs, no rubs or gallops;   radial pulses are full and equal   Jugular venous pressure is not elevated   Abdomen/GI:  No tenderness, no organomegaly, no pulsatile masses. NBS.   Extremities: No cyanosis or clubbing.  No peripheral edema   Skin: No rash or lesions   Heme/lymph/ Immunology No lymphadenopathy, petechiae   Neurologic: Alert and oriented. No focal motor weakness, gait appears normal.       Psychiatric: Pleasant, calm, appropriate affect.       Lab Test 04/17/24  0835   CHOL 142   HDL 68   LDL 63   TRIG 56      No known hepatic, renal, pulmonary, or CNS disorders. The remainder of the complete ROS is negative except as noted above.         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    Asthma     Cardiac arrest (H) 7/16/15    Cardiomyopathy (H)     Cardiomyopathy, ischemic     Congestive heart failure, unspecified     Coronary artery disease     Coronary artery disease     Depressive disorder     High cholesterol     Hyperlipidemia     Keratosis     MI (myocardial infarction) (H) 7/10/15    anterior    BECKI (obstructive sleep apnea)     no CPAP    Prostate cancer (H) 3/30/2022    Uncomplicated asthma      Past Surgical History:   Procedure Laterality Date    CARDIAC  CATHETERIZATION      CORONARY STENT PLACEMENT      EP ICD INSERT      HERNIA REPAIR      INSERT / REPLACE / REMOVE PACEMAKER      LAPAROSCOPIC APPENDECTOMY N/A 1/29/2023    Procedure: APPENDECTOMY, LAPAROSCOPIC;  Surgeon: Tung Rodas MD;  Location: [facility] OR    LAPAROSCOPIC HERNIORRHAPHY INGUINAL Bilateral 11/21/2016    Procedure: LAPAROSCOPIC BILATERAL INGUINAL HERNIA REPAIR;  Surgeon: Yugn Anderson MD;  [facility];  Service:     OTHER SURGICAL HISTORY  7/10/15    coronary stentsmid lad    OTHER SURGICAL HISTORY  7/22/15    icd dual, [facility]    PICC  7/18/2015          Family History   Problem Relation Age of Onset    Prostate Cancer Father     Diabetes No family hx of     Coronary Artery Disease No family hx of     Parkinsonism Mother     Cancer Father     No Known Problems Sister     No Known Problems Sister         Social History     Socioeconomic History    Marital status:      Spouse name: Not on file    Number of children: Not on file    Years of education: Not on file    Highest education level: Not on file   Occupational History    Not on file   Tobacco Use    Smoking status: Never    Smokeless tobacco: Never   Vaping Use    Vaping status: Never Used   Substance and Sexual Activity    Alcohol use: Yes     Alcohol/week: 7.0 standard drinks of alcohol    Drug use: No    Sexual activity: Yes     Partners: Female   Other Topics Concern    Not on file   Social History Narrative    Not on file     Social Determinants of Health     Financial Resource Strain: High Risk (1/1/2022)    [facility], [facility]    Financial Resource Strain     Difficulty of Paying Living Expenses: Not on file     Difficulty of Paying Living Expenses: Not on file   Food Insecurity: Not on file   Transportation Needs: Not on file   Physical Activity: Not on file   Stress: Not on file   Social Connections: Unknown  (1/1/2022)    Received from [facility], [employer] [facility]    Social Connections     Frequency of Communication with Friends and Family: Not on file   Interpersonal Safety: Not on file   Housing Stability: Not on file           Medications  Allergies   Current Outpatient Medications   Medication Sig Dispense Refill    albuterol (PROAIR HFA/PROVENTIL HFA/VENTOLIN HFA) 108 (90 Base) MCG/ACT inhaler Inhale 2 puffs into the lungs every 4 hours as needed for shortness of breath / dyspnea or wheezing 18 g 1    ASPIRIN PO Take 81 mg by mouth      atorvastatin (LIPITOR) 40 MG tablet TAKE 1 TABLET (40 MG TOTAL) BY MOUTH DAILY. 90 tablet 0    metoprolol succinate ER (TOPROL XL) 25 MG 24 hr tablet Take 1 tablet (25 mg) by mouth daily 90 tablet 0    order for DME Equipment being ordered: Home blood pressure monitor 1 Units 0    sildenafil (VIAGRA) 100 MG tablet Take one half to one tablet as needed. 20 tablet 11    tadalafil (CIALIS) 20 MG tablet Take 10 mg by mouth twice a week      oxyCODONE (ROXICODONE) 5 MG tablet Take 1-2 tablets (5-10 mg) by mouth every 4 hours as needed for pain (Patient not taking: Reported on 3/13/2023) 12 tablet 0    senna-docusate (SENOKOT-S/PERICOLACE) 8.6-50 MG tablet Take 2 tablets by mouth daily Take while taking oxycodone. Hold for loose stools. (Patient not taking: Reported on 3/13/2023) 20 tablet 0     No Known Allergies       Lab Results    Chemistry/lipid CBC Cardiac Enzymes/BNP/TSH/INR   Recent Labs   Lab Test 04/17/24  0835 11/08/17  0912 10/24/16  1100   CHOL 142   < > 168.4   HDL 68   < > 66.4   LDL 63   < > 87   TRIG 56   < > 73.6   CHOLHDLRATIO  --   --  2.5    < > = values in this interval not displayed.     Recent Labs   Lab Test 04/17/24  0835 11/10/22  0915 09/10/21  1057   LDL 63 59 52     Recent Labs   Lab Test 04/25/24  1323      POTASSIUM 4.1   CHLORIDE 104   CO2 24   GLC 82   BUN 11.8   CR 1.03   GFRESTIMATED 79  "  ALEENA 8.6*     Recent Labs   Lab Test 04/25/24  1323 04/17/24  0835 01/28/23  1807   CR 1.03 1.13 0.90     No results for input(s): \"A1C\" in the last 27407 hours.       Recent Labs   Lab Test 01/28/23  1807   WBC 10.8   HGB 14.0   HCT 42.7   MCV 94        Recent Labs   Lab Test 01/28/23  1807 09/06/19  1535   HGB 14.0 13.7    No results for input(s): \"TROPONINI\" in the last 41549 hours.  No results for input(s): \"BNP\", \"NTBNPI\", \"NTBNP\" in the last 75210 hours.  Recent Labs   Lab Test 04/17/24  0835   TSH 6.65*     No results for input(s): \"INR\" in the last 04372 hours.     Joseph Cronin MD                                    "

## 2024-07-29 NOTE — PATIENT INSTRUCTIONS
No new medications  OK to trial decrease of metoprolol to 12.5 mg/day  Follow up 1 year, sooner if new symptoms develop

## 2024-07-29 NOTE — LETTER
7/29/2024    Slade Parisi MD  [address] [place] [address]    RE: Inocente Rios       Dear Colleague,     I had the pleasure of seeing Inocente Rios in the [facility].    HEART CARE ENCOUNTER NOTE      [facility]  [phone]      Assessment/Recommendations   Assessment:   1.CAD, with history of anterior MI 2015, PCI/stent to LAD. Subsequent out of hospital Vfib arrest POD6 treated with citizen CPR, hypothermia, with complete recovery.   A. S/p ICD implant  B. Normal Stress MPI 2019  C. No angina         Plan:   1.  Will decrease metoprolol to 12.5 mg/day to see if this improves symptoms of erectile dysfunction.  I prefer to keep him on at least a low-dose of beta-blocker given his history of previous V-fib arrest.  If there is an increase in ectopy on his device interrogation may need to increase the dose back to baseline 25 mg/day.  2.  Follow-up 1 year           History of Present Illness/Subjective    HPI:  Inocente Rios is a 67 year old male with a history of anterior MI in 2015 treated with primary PCI and stent implant to the LAD.  He was doing well in the 6 postoperative day after he was home he sustained a V-fib out-of-hospital arrest.  He was treated with Citizen CPR and hypothermia.  His stents were widely patent.  An ICD was implanted.  He is done well since then has had no recurrent problems.  His last stress MPI was in 2019 which showed normal function and no evidence of inducible ischemia.  He returns today for routine follow-up.  He feels well.  He denies any new symptoms.  Specifically he has no chest pain lightheadedness palpitations or decrease in exercise tolerance.  He has had some persisting problems with erectile dysfunction despite using both Cialis and Viagra.  He had a complete prostatectomy in the past.  Other stressors include recent separation from his wife of 39 years.  He actually feels that he has less stress now  that he is left the marriage.         Physical Examination  Review of Systems   /76 (BP Location: Right arm, Patient Position: Sitting, Cuff Size: Adult Regular)   Pulse 80   Resp 14   Wt 72.6 kg (160 lb)   BMI 22.96 kg/m    Body mass index is 22.96 kg/m .  Wt Readings from Last 3 Encounters:   07/29/24 72.6 kg (160 lb)   03/13/23 74.4 kg (164 lb)   01/29/23 71.9 kg (158 lb 9.6 oz)       General Appearance:   Pleasant middle-age male appears stated age. no acute distress, normal, nonobese body habitus   ENT/Mouth: Oropharynx normal    EYES:  no scleral icterus, normal conjunctivae. No eyelid xanthelasma   Neck: No cervical lymphadenopathy  Thyroid not enlarged or nodular  No JVD   Respiratory:   lungs clear , no rales or wheezing, Normal chest wall expansion    Cardiovascular:   Regular rhythm, normal rate. Normal 1st and 2nd heart sounds.  No murmurs, no rubs or gallops;   radial pulses are full and equal   Jugular venous pressure is not elevated   Abdomen/GI:  No tenderness, no organomegaly, no pulsatile masses. NBS.   Extremities: No cyanosis or clubbing.  No peripheral edema   Skin: No rash or lesions   Heme/lymph/ Immunology No lymphadenopathy, petechiae   Neurologic: Alert and oriented. No focal motor weakness, gait appears normal.       Psychiatric: Pleasant, calm, appropriate affect.       Lab Test 04/17/24  0835   CHOL 142   HDL 68   LDL 63   TRIG 56      No known hepatic, renal, pulmonary, or CNS disorders. The remainder of the complete ROS is negative except as noted above.         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    Asthma     Cardiac arrest (H) 7/16/15    Cardiomyopathy (H)     Cardiomyopathy, ischemic     Congestive heart failure, unspecified     Coronary artery disease     Coronary artery disease     Depressive disorder     High cholesterol     Hyperlipidemia     Keratosis     MI  (myocardial infarction) (H) 7/10/15    anterior    BECKI (obstructive sleep apnea)     no CPAP    Prostate cancer (H) 3/30/2022    Uncomplicated asthma      Past Surgical History:   Procedure Laterality Date    CARDIAC CATHETERIZATION      CORONARY STENT PLACEMENT      EP ICD INSERT      HERNIA REPAIR      INSERT / REPLACE / REMOVE PACEMAKER      LAPAROSCOPIC APPENDECTOMY N/A 1/29/2023    Procedure: APPENDECTOMY, LAPAROSCOPIC;  Surgeon: Tung Rodas MD;  Location: [facility] OR    LAPAROSCOPIC HERNIORRHAPHY INGUINAL Bilateral 11/21/2016    Procedure: LAPAROSCOPIC BILATERAL INGUINAL HERNIA REPAIR;  Surgeon: Yung Anderson MD;  Location: [facility];  Service:     OTHER SURGICAL HISTORY  7/10/15    coronary stentsmid lad    OTHER SURGICAL HISTORY  7/22/15    icd dual, [facility]    PICC  7/18/2015          Family History   Problem Relation Age of Onset    Prostate Cancer Father     Diabetes No family hx of     Coronary Artery Disease No family hx of     Parkinsonism Mother     Cancer Father     No Known Problems Sister     No Known Problems Sister         Social History     Socioeconomic History    Marital status:      Spouse name: Not on file    Number of children: Not on file    Years of education: Not on file    Highest education level: Not on file   Occupational History    Not on file   Tobacco Use    Smoking status: Never    Smokeless tobacco: Never   Vaping Use    Vaping status: Never Used   Substance and Sexual Activity    Alcohol use: Yes     Alcohol/week: 7.0 standard drinks of alcohol    Drug use: No    Sexual activity: Yes     Partners: Female   Other Topics Concern    Not on file   Social History Narrative    Not on file     Social Determinants of Health     Financial Resource Strain: High Risk (1/1/2022)    [facility] from [facility], [facility]    Financial Resource Strain     Difficulty of Paying Living Expenses: Not on  file     Difficulty of Paying Living Expenses: Not on file   Food Insecurity: Not on file   Transportation Needs: Not on file   Physical Activity: Not on file   Stress: Not on file   Social Connections: Unknown (1/1/2022)    Received from [employer], [employer] [facility]    Social Connections     Frequency of Communication with Friends and Family: Not on file   Interpersonal Safety: Not on file   Housing Stability: Not on file           Medications  Allergies   Current Outpatient Medications   Medication Sig Dispense Refill    albuterol (PROAIR HFA/PROVENTIL HFA/VENTOLIN HFA) 108 (90 Base) MCG/ACT inhaler Inhale 2 puffs into the lungs every 4 hours as needed for shortness of breath / dyspnea or wheezing 18 g 1    ASPIRIN PO Take 81 mg by mouth      atorvastatin (LIPITOR) 40 MG tablet TAKE 1 TABLET (40 MG TOTAL) BY MOUTH DAILY. 90 tablet 0    metoprolol succinate ER (TOPROL XL) 25 MG 24 hr tablet Take 1 tablet (25 mg) by mouth daily 90 tablet 0    order for DME Equipment being ordered: Home blood pressure monitor 1 Units 0    sildenafil (VIAGRA) 100 MG tablet Take one half to one tablet as needed. 20 tablet 11    tadalafil (CIALIS) 20 MG tablet Take 10 mg by mouth twice a week      oxyCODONE (ROXICODONE) 5 MG tablet Take 1-2 tablets (5-10 mg) by mouth every 4 hours as needed for pain (Patient not taking: Reported on 3/13/2023) 12 tablet 0    senna-docusate (SENOKOT-S/PERICOLACE) 8.6-50 MG tablet Take 2 tablets by mouth daily Take while taking oxycodone. Hold for loose stools. (Patient not taking: Reported on 3/13/2023) 20 tablet 0     No Known Allergies       Lab Results    Chemistry/lipid CBC Cardiac Enzymes/BNP/TSH/INR   Recent Labs   Lab Test 04/17/24  0835 11/08/17  0912 10/24/16  1100   CHOL 142   < > 168.4   HDL 68   < > 66.4   LDL 63   < > 87   TRIG 56   < > 73.6   CHOLHDLRATIO  --   --  2.5    < > = values in this interval not displayed.     Recent  "Labs   Lab Test 04/17/24  0835 11/10/22  0915 09/10/21  1057   LDL 63 59 52     Recent Labs   Lab Test 04/25/24  1323      POTASSIUM 4.1   CHLORIDE 104   CO2 24   GLC 82   BUN 11.8   CR 1.03   GFRESTIMATED 79   ALEENA 8.6*     Recent Labs   Lab Test 04/25/24  1323 04/17/24  0835 01/28/23  1807   CR 1.03 1.13 0.90     No results for input(s): \"A1C\" in the last 25092 hours.       Recent Labs   Lab Test 01/28/23  1807   WBC 10.8   HGB 14.0   HCT 42.7   MCV 94        Recent Labs   Lab Test 01/28/23  1807 09/06/19  1535   HGB 14.0 13.7    No results for input(s): \"TROPONINI\" in the last 26822 hours.  No results for input(s): \"BNP\", \"NTBNPI\", \"NTBNP\" in the last 69285 hours.  Recent Labs   Lab Test 04/17/24  0835   TSH 6.65*     No results for input(s): \"INR\" in the last 97435 hours.     Joseph Cronin MD            Thank you for allowing me to participate in the care of your patient.      Sincerely,     Joseph Cronin MD     [facility]  cc:   Joseph Cronin MD  [address]  [place] [address]      "

## 2024-08-28 DIAGNOSIS — I25.10 CORONARY ARTERY DISEASE INVOLVING NATIVE CORONARY ARTERY OF NATIVE HEART WITHOUT ANGINA PECTORIS: ICD-10-CM

## 2024-08-28 DIAGNOSIS — I49.01 VENTRICULAR FIBRILLATION (H): ICD-10-CM

## 2024-08-28 RX ORDER — METOPROLOL SUCCINATE 25 MG/1
12.5 TABLET, EXTENDED RELEASE ORAL DAILY
Qty: 45 TABLET | Refills: 2 | Status: SHIPPED | OUTPATIENT
Start: 2024-08-28

## 2024-08-28 NOTE — TELEPHONE ENCOUNTER
"    Per last office visit 07/29:  \"Plan:   1.  Will decrease metoprolol to 12.5 mg/day to see if this improves symptoms of erectile dysfunction.  I prefer to keep him on at least a low-dose of beta-blocker given his history of previous V-fib arrest.  If there is an increase in ectopy on his device interrogation may need to increase the dose back to baseline 25 mg/day.  2.  Follow-up 1 year\"  "

## 2024-09-22 ENCOUNTER — ANCILLARY PROCEDURE (OUTPATIENT)
Dept: CARDIOLOGY | Facility: CLINIC | Age: 69
End: 2024-09-22
Attending: INTERNAL MEDICINE
Payer: COMMERCIAL

## 2024-09-22 DIAGNOSIS — Z95.810 ICD (IMPLANTABLE CARDIOVERTER-DEFIBRILLATOR) IN PLACE: ICD-10-CM

## 2024-09-22 DIAGNOSIS — I49.01 VF (VENTRICULAR FIBRILLATION) (H): ICD-10-CM

## 2024-10-14 LAB
MDC_IDC_EPISODE_DTM: NORMAL
MDC_IDC_EPISODE_DTM: NORMAL
MDC_IDC_EPISODE_ID: NORMAL
MDC_IDC_EPISODE_ID: NORMAL
MDC_IDC_EPISODE_TYPE: NORMAL
MDC_IDC_EPISODE_TYPE: NORMAL
MDC_IDC_LEAD_CONNECTION_STATUS: NORMAL
MDC_IDC_LEAD_IMPLANT_DT: NORMAL
MDC_IDC_LEAD_LOCATION: NORMAL
MDC_IDC_LEAD_LOCATION_DETAIL_1: NORMAL
MDC_IDC_LEAD_MFG: NORMAL
MDC_IDC_LEAD_MODEL: NORMAL
MDC_IDC_LEAD_POLARITY_TYPE: NORMAL
MDC_IDC_LEAD_SERIAL: NORMAL
MDC_IDC_MSMT_BATTERY_DTM: NORMAL
MDC_IDC_MSMT_BATTERY_REMAINING_LONGEVITY: 102 MO
MDC_IDC_MSMT_BATTERY_REMAINING_PERCENTAGE: 88 %
MDC_IDC_MSMT_BATTERY_STATUS: NORMAL
MDC_IDC_MSMT_CAP_CHARGE_DTM: NORMAL
MDC_IDC_MSMT_CAP_CHARGE_DTM: NORMAL
MDC_IDC_MSMT_CAP_CHARGE_ENERGY: 21 J
MDC_IDC_MSMT_CAP_CHARGE_TIME: 10.5 S
MDC_IDC_MSMT_CAP_CHARGE_TIME: 3.7 S
MDC_IDC_MSMT_CAP_CHARGE_TYPE: NORMAL
MDC_IDC_MSMT_CAP_CHARGE_TYPE: NORMAL
MDC_IDC_MSMT_LEADCHNL_RV_IMPEDANCE_VALUE: 806 OHM
MDC_IDC_PG_IMPLANT_DTM: NORMAL
MDC_IDC_PG_MFG: NORMAL
MDC_IDC_PG_MODEL: NORMAL
MDC_IDC_PG_SERIAL: NORMAL
MDC_IDC_PG_TYPE: NORMAL
MDC_IDC_SESS_CLINIC_NAME: NORMAL
MDC_IDC_SESS_DTM: NORMAL
MDC_IDC_SESS_TYPE: NORMAL
MDC_IDC_SET_BRADY_LOWRATE: 40 {BEATS}/MIN
MDC_IDC_SET_BRADY_MODE: NORMAL
MDC_IDC_SET_LEADCHNL_RV_PACING_AMPLITUDE: 2.3 V
MDC_IDC_SET_LEADCHNL_RV_PACING_POLARITY: NORMAL
MDC_IDC_SET_LEADCHNL_RV_PACING_PULSEWIDTH: 0.4 MS
MDC_IDC_SET_LEADCHNL_RV_SENSING_ADAPTATION_MODE: NORMAL
MDC_IDC_SET_LEADCHNL_RV_SENSING_POLARITY: NORMAL
MDC_IDC_SET_LEADCHNL_RV_SENSING_SENSITIVITY: 0.6 MV
MDC_IDC_SET_ZONE_DETECTION_INTERVAL: 250 MS
MDC_IDC_SET_ZONE_DETECTION_INTERVAL: 324 MS
MDC_IDC_SET_ZONE_STATUS: NORMAL
MDC_IDC_SET_ZONE_STATUS: NORMAL
MDC_IDC_SET_ZONE_TYPE: NORMAL
MDC_IDC_SET_ZONE_TYPE: NORMAL
MDC_IDC_SET_ZONE_VENDOR_TYPE: NORMAL
MDC_IDC_SET_ZONE_VENDOR_TYPE: NORMAL
MDC_IDC_STAT_BRADY_DTM_END: NORMAL
MDC_IDC_STAT_BRADY_DTM_START: NORMAL
MDC_IDC_STAT_BRADY_RV_PERCENT_PACED: 0 %
MDC_IDC_STAT_EPISODE_RECENT_COUNT: 0
MDC_IDC_STAT_EPISODE_RECENT_COUNT_DTM_END: NORMAL
MDC_IDC_STAT_EPISODE_RECENT_COUNT_DTM_START: NORMAL
MDC_IDC_STAT_EPISODE_TYPE: NORMAL
MDC_IDC_STAT_EPISODE_VENDOR_TYPE: NORMAL
MDC_IDC_STAT_TACHYTHERAPY_ATP_DELIVERED_RECENT: 0
MDC_IDC_STAT_TACHYTHERAPY_ATP_DELIVERED_TOTAL: 0
MDC_IDC_STAT_TACHYTHERAPY_RECENT_DTM_END: NORMAL
MDC_IDC_STAT_TACHYTHERAPY_RECENT_DTM_START: NORMAL
MDC_IDC_STAT_TACHYTHERAPY_SHOCKS_ABORTED_RECENT: 0
MDC_IDC_STAT_TACHYTHERAPY_SHOCKS_ABORTED_TOTAL: 0
MDC_IDC_STAT_TACHYTHERAPY_SHOCKS_DELIVERED_RECENT: 0
MDC_IDC_STAT_TACHYTHERAPY_SHOCKS_DELIVERED_TOTAL: 1
MDC_IDC_STAT_TACHYTHERAPY_TOTAL_DTM_END: NORMAL
MDC_IDC_STAT_TACHYTHERAPY_TOTAL_DTM_START: NORMAL

## 2024-10-14 PROCEDURE — 93296 REM INTERROG EVL PM/IDS: CPT | Performed by: INTERNAL MEDICINE

## 2024-10-14 PROCEDURE — 93295 DEV INTERROG REMOTE 1/2/MLT: CPT | Performed by: INTERNAL MEDICINE

## 2024-10-29 ENCOUNTER — TELEPHONE (OUTPATIENT)
Dept: CARDIOLOGY | Facility: CLINIC | Age: 69
End: 2024-10-29
Payer: MEDICARE

## 2024-10-29 DIAGNOSIS — I25.10 CORONARY ARTERY DISEASE INVOLVING NATIVE CORONARY ARTERY OF NATIVE HEART WITHOUT ANGINA PECTORIS: ICD-10-CM

## 2024-10-29 DIAGNOSIS — I49.01 VENTRICULAR FIBRILLATION (H): ICD-10-CM

## 2024-10-29 RX ORDER — METOPROLOL SUCCINATE 25 MG/1
12.5 TABLET, EXTENDED RELEASE ORAL DAILY
Qty: 45 TABLET | Refills: 2 | Status: SHIPPED | OUTPATIENT
Start: 2024-10-29 | End: 2024-11-01

## 2024-10-29 NOTE — TELEPHONE ENCOUNTER
[facility] Call Center    Phone Message    May a detailed message be left on voicemail: yes     Reason for Call: Medication Refill Request    Has the patient contacted the pharmacy for the refill? Yes   Name of medication being requested: metoprolol succinate ER (TOPROL XL) 25 MG 24 hr tablet   Provider who prescribed the medication: Dr. Cronin  Pharmacy: [facility] #[id number] - [place], [place] - [address]   Date medication is needed: ASAP, per patient, needing refills for the above script and has 1 day left. Patient requesting a full dose and not 0.5mg tablets. Please reach out to patient if any questions.        Action Taken: Other: Cardio    Travel Screening: Not Applicable     Date of Service:

## 2024-10-31 ENCOUNTER — TELEPHONE (OUTPATIENT)
Dept: CARDIOLOGY | Facility: CLINIC | Age: 69
End: 2024-10-31
Payer: MEDICARE

## 2024-10-31 DIAGNOSIS — I49.01 VENTRICULAR FIBRILLATION (H): ICD-10-CM

## 2024-10-31 DIAGNOSIS — I25.10 CORONARY ARTERY DISEASE INVOLVING NATIVE CORONARY ARTERY OF NATIVE HEART WITHOUT ANGINA PECTORIS: ICD-10-CM

## 2024-10-31 NOTE — TELEPHONE ENCOUNTER
Health Call Center    Phone Message    May a detailed message be left on voicemail: yes     Reason for Call: Medication Question or concern regarding medication   Prescription Clarification  Name of Medication:  metoprolol succinate ER (TOPROL XL) 25 MG 24 hr tablet   Prescribing Provider: Dr. Cronin   Pharmacy:    [facility]Y #[id number] - [place], [place] - [address]     What on the order needs clarification? Inocente states he went to his pharmacy to  his prescription but was given his half tab dose instead of the full tab that his Rx was changed too. Inocente states he is out of his prescription and did not accept the half tab dose that is available for him. Please reach out to Inocente with any questions or please send a full dose prescription to the pharmacy listed above. Thank you!      Action Taken: Other: Cardiology    Travel Screening: Not Applicable    Thank you!  [facility]       Date of Service:

## 2024-11-01 RX ORDER — METOPROLOL SUCCINATE 25 MG/1
25 TABLET, EXTENDED RELEASE ORAL DAILY
Qty: 90 TABLET | Refills: 3 | Status: SHIPPED | OUTPATIENT
Start: 2024-11-01

## 2024-11-01 NOTE — TELEPHONE ENCOUNTER
PC from patient with update that after Dr Cronin reduced his metoprolol at his recent OV, the patient did a trial of the lower dose and found no improvement of his ED. He states has self-resumed the higher recommended dose 25mg daily and is now out of his medication and is too early for insurance to approve refill. Per face to face conversation in clinic with Dr Brannon, in Dr Cronin's absence, received verbal order to increase this patient's metoprolol to 25mg daily. Order placed and call to patient with update on his VM.  -sea

## 2024-12-03 ENCOUNTER — TRANSFERRED RECORDS (OUTPATIENT)
Dept: HEALTH INFORMATION MANAGEMENT | Facility: CLINIC | Age: 69
End: 2024-12-03

## 2024-12-20 ENCOUNTER — TRANSFERRED RECORDS (OUTPATIENT)
Dept: HEALTH INFORMATION MANAGEMENT | Facility: CLINIC | Age: 69
End: 2024-12-20
Payer: MEDICARE

## 2025-01-02 ENCOUNTER — TRANSFERRED RECORDS (OUTPATIENT)
Dept: HEALTH INFORMATION MANAGEMENT | Facility: CLINIC | Age: 70
End: 2025-01-02
Payer: MEDICARE

## 2025-01-06 ENCOUNTER — PATIENT OUTREACH (OUTPATIENT)
Dept: ONCOLOGY | Facility: CLINIC | Age: 70
End: 2025-01-06

## 2025-01-06 ENCOUNTER — PRE VISIT (OUTPATIENT)
Dept: ONCOLOGY | Facility: CLINIC | Age: 70
End: 2025-01-06

## 2025-01-06 ENCOUNTER — TRANSCRIBE ORDERS (OUTPATIENT)
Dept: OTHER | Age: 70
End: 2025-01-06

## 2025-01-06 DIAGNOSIS — C61 PROSTATE CANCER (H): Primary | ICD-10-CM

## 2025-01-06 NOTE — PROGRESS NOTES
New Patient Oncology Nurse Navigator Note     Referring provider:   Call from patient     Referred to (specialty): Medical Oncology    Date Referral Received:   01/06/25     Evaluation for :   Prostate cancer     Clinical History (per Nurse review of records provided):    Await records    1/3/20  PSA - 8.6   Patient s/p RRP 5/20/20, with Dr. Angel, [facility], performed at [facility]. [place] 4+3= 7, tertiary carrie pattern 5 present, 0/2 lymph nodes.  (Pathology bookmarked in Media & lab section of chart)     Per discussion with patient:  He received radiation to the prostate bed sometime between 2020 & 2022.  He did not receive any ADT during this time or ever.  He had a 12/20/24 PSMA PET scan which patient indicates lesions on vertebrae were found.  He met with med onc, Dr. Salmon last weeks.  Patient indicates he recommended starting hormone therapy and radiation.   Inocente verbalizes he would like a second opinion at this point.    Clinical Assessment / Barriers to Care (Per Nurse):        Records Location (Care Everywhere, Media, etc.):   [facility]  -rad onc Dr Brennan & medical oncology - Dr. Salmon  [facility]  PSMA PET 12/20/25       Awaiting records     1/6/25 1248  I called in attempt to reach patient to discuss referral.  There was no answer so I LM for him to call me.    1/6/25 1250  Inocente returned my call.   I introduced my role and reviewed what this consult visit will entail/what to expect.  I reviewed the location and gave contact numbers including new patient scheduling and clinic phone numbers.  I reviewed history per above with Inocente I let Inocente know that once records are in process, I will send referral on to our new patient scheduling team and they will reach out to finalize plan.   Inocente has no other questions at this time.    Tentative plan:  HOLD: Dr. Chavez, 1/15/25, 3-4 PM, [facility], in person      Abril Parker, RN, BSN  Oncology New Patient Nurse Navigator   [facility]  [facility]  [phone]

## 2025-01-06 NOTE — TELEPHONE ENCOUNTER
Action January 9, 2025 1:25 PM ABT   Action Taken Records from [facility] received and sent to HIM for upload and NN email for review.     Imaging Received  January 7, 2025 7:33 AM ABT   Action: Images from [facility] received and resolved to PACS.     Action January 6, 2025 8:35 AM    Action Taken Warm Transfer from [facility].     Patient is seen at [facility] for radiation therapy with Dr. Brennan, he recommended patient see a med onc provider. Patient was also seen at [facility] with Dr. Virgil Hansen (Urologist).     All imaging was done with [facility].  Prostate bx is done at [facility] (MEDIA TAB), prostatectomy report is in [facility].   Patient has no hx of genetic testing or been seen by a med onc provider before.     - JOY is emailed to PT to complete for [facility]    12:59 PM:  - Receive JOY, faxed to [facility] for records.      RECORDS STATUS - ALL OTHER DIAGNOSIS      RECORDS RECEIVED FROM:    NOTES STATUS DETAILS   OFFICE NOTE from referring provider External Self-Referred   OFFICE NOTE from radiation oncologist Ext: [facility] Dr. Derrick Brennan   OFFICE NOTE from other specialist Ext: [facility] Dr. Virgil Hansen   DISCHARGE SUMMARY from hospital     DISCHARGE REPORT from the ER     OPERATIVE REPORT [facility]- [facility]:  5/20/20: PROSTATECTOMY     [facility]:  2/27/20: Prostate Bx   MEDICATION LIST Ext: [facility]    LABS     PATHOLOGY REPORTS Report in [facility]- [facility]:  5/20/20: [id number]     [facility]:  2/27/20: [id number]   ANYTHING RELATED TO DIAGNOSIS  Most recent    PATHOLOGY FEDEX TRACKING   Tracking #:   GENONOMIC TESTING     TYPE:     IMAGING (NEED IMAGES & REPORT)     CT SCANS PACS 1/28/23: CT Abd Pel    [facility]:  5/4/20: CT Abd Pel   MRI     XRAYS     ULTRASOUND     NM  PACS [facility]:  5/4/20: NM Whole Body   PET PACS 1/10/22: PET PSMA   IMAGE DISC FEDEX TRACKING   Tracking #:

## 2025-01-07 NOTE — PROGRESS NOTES
PRIMARY CARE PHYSICIAN: Slade Parisi MD  CARDIOLOGY: Joseph Cronin MD  MEDICAL ONCOLOGY: Jorge Salmon MD  RADIATION ONCOLOGY: Derrick Brennan MD  UROLOGY: Bing Angel MD    HISTORY OF PRESENT ILLNESS: Patient is a 69-year-old male with stage IVB adenocarcinoma prostate (pT3a, pN0, cM1b, PSA 8.6 ng/mL, grade group 3).  Patient was diagnosed with stage III adenocarcinoma prostate (pT3a, pN0, cM0, PSA 8.6 ng/mL, grade group 3), and presented with an elevated PSA of 8.6 ng/mL (01/03/2020). Transrectal ultrasound-guided prostate core needle biopsy 02/27/2020, revealed a [place] 4+3=7 adenocarcinoma involving 75% of the core needle biopsy sample from the right mid medial, 75% of the biopsy sample from the right mid lateral, 90% of the biopsy sample from the right medial base, 70% of the biopsy sample with perineural invasion from the right lateral base.  There is a [place] 3+4=7 adenocarcinoma involving 30% of the core needle biopsy sample from the left medial apex, 50% of the biopsy sample from the left lateral apex, 25% of the biopsy sample from the left mid medial, 55% of the biopsy sample from the left medial base.  There was a [place] 3+3=6 adenocarcinoma involving 20% of the core needle biopsy sample from the right medial apex, 5-10% of the biopsy sample from the right lateral apex, 30% of the biopsy sample from the left mid lateral.  There was high-grade prostatic intraepithelial neoplasia with rare adjacent small atypical glands in the core needle biopsy sample from the left lateral base (11/12 biopsy samples involved). CT abdomen, pelvis 05/04/2020, was negative for adenopathy or metastases.  There was heterogeneous enhancement of the prostate with 9 mm enhancing nodule along the right posterior aspect of the prostate.  Bone scan 05/04/2020 was negative for metastases.  There was mild uptake in the bilateral acromioclavicular joints and right knee. Patient underwent robotic-assisted laparoscopic  radical prostatectomy and bilateral pelvic lymph node dissection 05/20/2020, there revealed a [place] 4+3=7 acinar and ductal adenocarcinoma involving 35% of the prostate with the largest focus measuring 1.5 cm.  There was nonfocal extraprostatic extension involving the right posterior prostate.  There was perineural, but no lymphovascular invasion.  There was no bladder neck invasion or seminal vesicle involvement.  Surgical margins were tumor free. Two pelvic lymph nodes were negative for metastases (0/2 lymph nodes involved).     PSA was undetectable at <0.1 ng/mL (from 08/12/2020 to 05/27/2021) with subsequent biochemical pression with PSA 0.13 ng/mL (11/11/2021), PSA 0.15 ng/mL (12/03/2021), PSA 0.35 ng/mL (02/22/2022).  Decipher Genomic Risk 12/22/2021) revealed a decipher score of 0.32 (low risk).  18-F-Fluciclovine PET/CT scan 01/10/2022, revealed postsurgical changes related to prior prostatectomy and pelvic lymph node dissection.  There were no suspicious PSMA-avid lesions.  Patient received salvage radiation therapy to the prostate fossa (6800 cGy in 34 fractions) and bilateral pelvic lymph nodes (4500 cGy in 25 fractions) from 04/18/2022 through 06/06/2022, without androgen deprivation therapy (ADT).  There was a PSA alisa of 0.1 ng/mL (12/30/2022) followed by a rising PSA of 0.2 ng/mL (03/30/2023), PSA 0.3 ng/mL (10/24/2023).  PSMA PET/CT scan 10/30/2023, was negative for PSMA-avid lesions.    There was continued biochemical progression with PSA 0.6 ng/mL (01/21/2024), PSA 0.76 ng mL (03/21/2024), PSA 0.97 ng/mL (05/02/2024).  PSMA PET/CT scan 05/24/2024, showed postsurgical changes related to prior prostatectomy.  There were no PSMA-avid metastases.  Patient continued observation with PSA 1.1 ng mL (07/31/2024), PSA 1.92 ng/mL (10/31/2024), PSA 2.2 ng mL (12/03/2024).  PSMA PET/CT scan 12/20/2024, revealed PSMA-avid metastases in the right T6 transverse process and left L2 pedicle. Patient is referred  to the [facility] for evaluation and recommendations.  Patient has not started prostate cancer-directed therapy yet.  Patient has fatigue and occasional right low back discomfort.  Patient reports impotence since prostate cancer surgery and radiation therapy.  Patient denies fever, anorexia, weight loss, headache, cough, dyspnea, chest pain, abdominal pain, nausea, vomiting, constipation, diarrhea.     PAST HISTORY:   -Hyperlipidemia.  -Coronary artery disease status post anterior myocardial infarction and mid LAD stent placement, 07/10/2015.  -History of out-of-hospital ventricular fibrillation cardiac arrest with anoxic encephalopathy 07/16/2015, with full recovery.  -Ischemic cardiomyopathy status post internal cardiac defibrillator placement, 07/22/2015.  -Asthma.  -Obstructive sleep apnea.  -Stage IVB adenocarcinoma prostate (pT3a, pN0, cM1b, PSA 8.6 ng/mL, grade group 3).  -Erectile dysfunction.  -Varicose veins  -Colon polyp. A sessile serrated adenoma was removed from the ascending colon at the time of colonoscopy, 09/11/2019.  -Depression.  -Bilateral sensorineural hearing loss.  -Laparoscopic appendectomy, 01/29/2023.  -Laparoscopic bilateral inguinal hernia repair, 11/21/2016.  -Vasectomy, 1990s.  -Bilateral varicocelectomy, 1980s.    ALLERGIES: No known drug allergies.    MEDICATIONS:   Current Outpatient Medications   Medication Sig Dispense Refill    bicalutamide (CASODEX) 50 MG tablet Take 1 tablet (50 mg) by mouth daily for 21 days. Begin at the time of the first leuprolide injection. 21 tablet 0    calcium carbonate-vitamin D (OSCAL) 500-5 MG-MCG tablet Take 1 tablet by mouth 2 times daily. 120 tablet 5    albuterol (PROAIR HFA/PROVENTIL HFA/VENTOLIN HFA) 108 (90 Base) MCG/ACT inhaler Inhale 2 puffs into the lungs every 4 hours as needed for shortness of breath / dyspnea or wheezing 18 g 1    ASPIRIN PO Take 81 mg by mouth      atorvastatin (LIPITOR) 40 MG tablet TAKE 1 TABLET (40 MG  "TOTAL) BY MOUTH DAILY. 90 tablet 0    metoprolol succinate ER (TOPROL XL) 25 MG 24 hr tablet Take 1 tablet (25 mg) by mouth daily. 90 tablet 3    order for DME Equipment being ordered: Home blood pressure monitor 1 Units 0    sildenafil (VIAGRA) 100 MG tablet Take one half to one tablet as needed. 20 tablet 11    tadalafil (CIALIS) 20 MG tablet Take 10 mg by mouth twice a week         FAMILY HISTORY: Father  at age 95 after mechanical fall and had a history of prostate cancer treated with definitive radiation therapy.  Mother  age 83 of Parkinson's disease.  There are 2 sisters: Adia Bailey age 71 with osteoporosis; Jess Beard age 68 with Parkinson's disease.  There are 2 daughters: 1 daughter age 32 is alive and well; an adopted daughter age 30 is alive and well.  There is no family history of breast, ovarian, or pancreas cancer.    SOCIAL HISTORY: Patient was born in [place], [place] and raised in [place], [place].  Patient moved to [place] in .  He is  since 2024 and lives on his own in [place].  Patient is self-employed and his  service since .  Patient also worked as a  for 40 years up until .  He does not consume tobacco.  He drinks beer on occasion.  There is no  service.    Social History     Tobacco Use    Smoking status: Never    Smokeless tobacco: Never   Vaping Use    Vaping status: Never Used   Substance Use Topics    Alcohol use: Yes     Alcohol/week: 7.0 standard drinks of alcohol    Drug use: No       REVIEW OF SYSTEMS: Review of systems reviewed with the patient and otherwise negative except for those detailed above.    PHYSICAL EXAM: BP (!) 142/82 (BP Location: Right arm, Patient Position: Sitting, Cuff Size: Adult Regular)   Pulse 79   Temp 98.1  F (36.7  C) (Oral)   Resp 16   Ht 1.778 m (5' 10\")   Wt 72 kg (158 lb 12.8 oz)   SpO2 100%   BMI 22.79 kg/m  .  Body surface area is 1.89 meters squared. ECOG performance " status: 0.   Skin: No erythema or rash.  HEENT: Sclera nonicteric.  Conjunctiva normal.  Pupils equal round reactive.  Nose clear.  Tongue and uvula midline.  Oropharynx without lesions or ulceration, mucosa pink and moist.  Neck: Supple without masses.  Nodes: No cervical, supraclavicular, axillary, or inguinal adenopathy.  Lungs: No dullness to percussion.  No rales, wheezes, rhonchi.  Heart: Regular rate and rhythm.  Abdomen: Bowel sounds present.  Soft, nontender, no hepatosplenomegaly or mass.  Extremities: No edema.  Neurologic: Sensory, motor, cerebellar normal.     IMPRESSION/PLAN: Stage IVB adenocarcinoma prostate.  Prostate cancer is a [place] 4+3=7 adenocarcinoma with perineural invasion and extraprostatic extension, without locoregional or distant metastases.  Patient underwent definitive surgery (05/20/2020).  Postoperative PSA was undetectable (<0.1 ng/mL from 08/12/2020 to 05/27/2021) with subsequent biochemical pression with PSA 0.13 ng/mL (11/11/2021), PSA 0.35 ng/mL (02/22/2022).  There was no evidence of metastases on restaging 18-F-Fluciclovine PET/CT scan (01/10/2022).  Patient received salvage radiation therapy to the prostate fossa and bilateral pelvic lymph nodes (from 04/18/2022 through 06/06/2022) without ADT.  There was a PSA alisa of 0.1 ng/mL (12/30/2022) followed by biochemical progression with PSA of 0.2 ng/mL (03/30/2023), PSA 0.3 ng/mL (10/24/2023).  PSA 0.76 ng mL (03/21/2024), PSA 0.97 ng/mL (05/02/2024), PSA 1.1 ng mL (07/31/2024), PSA 1.92 ng/mL (10/31/2024), PSA 2.2 ng mL (12/03/2024).  There was no evidence of metastases on PSMA PET/CT scan (10/30/2023, 05/24/2024) with subsequent detection of skeletal metastases to the right T6 transverse process and left L2 pedicle detected on repeat PSMA PET/CT scan (12/20/2024).  There is a PSA doubling time of 3-4 months.  Frontline therapy for low-volume metastatic hormone sensitive prostate cancer would consist of ADT consisting of  leuprolide every 3 months plus an androgen receptor pathway inhibitor such as abiraterone (LATITUDE clinical trial, N Engl J Med 2017;377:352-360), enzalutamide (ENZAMET clinical trial, N Engl J Med 2019;381:121-131), or apalutamide (TITAN clinical trial, N Engl J Med 2019;381(1):13-24).  Stereotactic ablative radiation therapy (SABR) may be used to treat the oligometastatic skeletal lesions for a more high-quality remission.  Patient does not appear to be eligible for the phase 3 ARASTEP clinical trial consisting of darolutamide plus ADT versus placebo plus ADT and patient with high risk biochemical recurrence of prostate cancer due to the presence of ductal component in the prostate adenocarcinoma.  Patient might be eligible for the phase 2 ARACOG clinical trial evaluating ADT plus darolutamide for enzalutamide and the evaluation of therapy on cognitive function, however patient would need to be on ADT with adequate suppression of testosterone for eligibility.  Patient wishes to proceed with first-line ADT consisting of leuprolide 22.5 mg every 3 months and enzalutamide 160 mg daily.  Patient will also proceed with SABR to the oligometastatic skeletal lesions, and he is set up to begin radiation therapy shortly.  I reviewed the risks and side effects of enzalutamide with the patient, which include fatigue, weakness, flushing, anorexia, weight loss, headache, dizziness, change in taste sensation, breast tenderness and swelling, constipation, diarrhea, peripheral edema, myalgias, arthralgias, falls, forgetfulness, depression, hypertension, and seizures.  Leuprolide is associated with fatigue, hot flashes, weakness, loss of muscle mass, weight gain, forgetfulness, depression, irritability breast enlargement, loss of libido, impotence, joint stiffness and pain, coronary artery disease, osteoporosis, and bone fracture.  Patient understood the indication and risks and agreed to proceed. Bicalutamide 50 mg daily x21 days  will be initiated at the time of the first leuprolide injection.  Thereafter bicalutamide will be replaced by enzalutamide.  Bone density scan will be performed to evaluate for osteopenia/osteoporosis, and patient will begin calcium plus vitamin D supplementation.  Denosumab 60 mg every 6 months is also recommended while on ADT.  Patient has stage IV prostate cancer and germline cancer gene mutation panel ([employer]), and somatic next generation sequencing ([facility]) on the prostatectomy sample (05/20/2020) will be performed to evaluate for mutations and biomarkers that can direct effective therapies.  Patient will return to clinic in 6 weeks with CBC, metabolic panel, PSA, testosterone. The current and past history, clinical evaluation, reviewing diagnostic tests and imaging with the patient, and assessment, complex management of androgen deprivation therapy and androgen receptor pathway inhibitor toxicities, and planning occurred over 60 minutes.       Александр Chavez MD    cc: MD Joseph Ellington MD Robert Delaune, MD Ryan K Funk, MD Basir U Tareen, MD

## 2025-01-15 ENCOUNTER — TELEPHONE (OUTPATIENT)
Dept: CARDIOLOGY | Facility: CLINIC | Age: 70
End: 2025-01-15

## 2025-01-15 ENCOUNTER — ANCILLARY PROCEDURE (OUTPATIENT)
Dept: CARDIOLOGY | Facility: CLINIC | Age: 70
End: 2025-01-15
Attending: INTERNAL MEDICINE
Payer: MEDICARE

## 2025-01-15 ENCOUNTER — ONCOLOGY VISIT (OUTPATIENT)
Dept: ONCOLOGY | Facility: CLINIC | Age: 70
End: 2025-01-15
Attending: INTERNAL MEDICINE
Payer: MEDICARE

## 2025-01-15 VITALS
SYSTOLIC BLOOD PRESSURE: 142 MMHG | OXYGEN SATURATION: 100 % | HEIGHT: 70 IN | DIASTOLIC BLOOD PRESSURE: 82 MMHG | HEART RATE: 79 BPM | TEMPERATURE: 98.1 F | WEIGHT: 158.8 LBS | RESPIRATION RATE: 16 BRPM | BODY MASS INDEX: 22.73 KG/M2

## 2025-01-15 DIAGNOSIS — Z95.810 ICD (IMPLANTABLE CARDIOVERTER-DEFIBRILLATOR) IN PLACE: ICD-10-CM

## 2025-01-15 DIAGNOSIS — C79.51 METASTASIS TO BONE (H): ICD-10-CM

## 2025-01-15 DIAGNOSIS — I49.01 VENTRICULAR FIBRILLATION (H): ICD-10-CM

## 2025-01-15 DIAGNOSIS — C61 PROSTATE CANCER (H): Primary | ICD-10-CM

## 2025-01-15 LAB
MDC_IDC_EPISODE_DTM: NORMAL
MDC_IDC_EPISODE_ID: NORMAL
MDC_IDC_EPISODE_TYPE: NORMAL
MDC_IDC_LEAD_CONNECTION_STATUS: NORMAL
MDC_IDC_LEAD_IMPLANT_DT: NORMAL
MDC_IDC_LEAD_LOCATION: NORMAL
MDC_IDC_LEAD_LOCATION_DETAIL_1: NORMAL
MDC_IDC_LEAD_MFG: NORMAL
MDC_IDC_LEAD_MODEL: NORMAL
MDC_IDC_LEAD_POLARITY_TYPE: NORMAL
MDC_IDC_LEAD_SERIAL: NORMAL
MDC_IDC_MSMT_BATTERY_DTM: NORMAL
MDC_IDC_MSMT_BATTERY_REMAINING_LONGEVITY: 90 MO
MDC_IDC_MSMT_BATTERY_REMAINING_PERCENTAGE: 83 %
MDC_IDC_MSMT_BATTERY_STATUS: NORMAL
MDC_IDC_MSMT_CAP_CHARGE_DTM: NORMAL
MDC_IDC_MSMT_CAP_CHARGE_DTM: NORMAL
MDC_IDC_MSMT_CAP_CHARGE_ENERGY: 21 J
MDC_IDC_MSMT_CAP_CHARGE_TIME: 10.5 S
MDC_IDC_MSMT_CAP_CHARGE_TIME: 3.7 S
MDC_IDC_MSMT_CAP_CHARGE_TYPE: NORMAL
MDC_IDC_MSMT_CAP_CHARGE_TYPE: NORMAL
MDC_IDC_MSMT_LEADCHNL_RV_IMPEDANCE_VALUE: 767 OHM
MDC_IDC_PG_IMPLANT_DTM: NORMAL
MDC_IDC_PG_MFG: NORMAL
MDC_IDC_PG_MODEL: NORMAL
MDC_IDC_PG_SERIAL: NORMAL
MDC_IDC_PG_TYPE: NORMAL
MDC_IDC_SESS_CLINIC_NAME: NORMAL
MDC_IDC_SESS_DTM: NORMAL
MDC_IDC_SESS_TYPE: NORMAL
MDC_IDC_SET_BRADY_LOWRATE: 40 {BEATS}/MIN
MDC_IDC_SET_BRADY_MODE: NORMAL
MDC_IDC_SET_LEADCHNL_RV_PACING_AMPLITUDE: 2.3 V
MDC_IDC_SET_LEADCHNL_RV_PACING_POLARITY: NORMAL
MDC_IDC_SET_LEADCHNL_RV_PACING_PULSEWIDTH: 0.4 MS
MDC_IDC_SET_LEADCHNL_RV_SENSING_ADAPTATION_MODE: NORMAL
MDC_IDC_SET_LEADCHNL_RV_SENSING_POLARITY: NORMAL
MDC_IDC_SET_LEADCHNL_RV_SENSING_SENSITIVITY: 0.6 MV
MDC_IDC_SET_ZONE_DETECTION_INTERVAL: 250 MS
MDC_IDC_SET_ZONE_DETECTION_INTERVAL: 324 MS
MDC_IDC_SET_ZONE_STATUS: NORMAL
MDC_IDC_SET_ZONE_STATUS: NORMAL
MDC_IDC_SET_ZONE_TYPE: NORMAL
MDC_IDC_SET_ZONE_TYPE: NORMAL
MDC_IDC_SET_ZONE_VENDOR_TYPE: NORMAL
MDC_IDC_SET_ZONE_VENDOR_TYPE: NORMAL
MDC_IDC_STAT_BRADY_DTM_END: NORMAL
MDC_IDC_STAT_BRADY_DTM_START: NORMAL
MDC_IDC_STAT_BRADY_RV_PERCENT_PACED: 0 %
MDC_IDC_STAT_EPISODE_RECENT_COUNT: 0
MDC_IDC_STAT_EPISODE_RECENT_COUNT_DTM_END: NORMAL
MDC_IDC_STAT_EPISODE_RECENT_COUNT_DTM_START: NORMAL
MDC_IDC_STAT_EPISODE_TYPE: NORMAL
MDC_IDC_STAT_EPISODE_VENDOR_TYPE: NORMAL
MDC_IDC_STAT_TACHYTHERAPY_ATP_DELIVERED_RECENT: 0
MDC_IDC_STAT_TACHYTHERAPY_ATP_DELIVERED_TOTAL: 0
MDC_IDC_STAT_TACHYTHERAPY_RECENT_DTM_END: NORMAL
MDC_IDC_STAT_TACHYTHERAPY_RECENT_DTM_START: NORMAL
MDC_IDC_STAT_TACHYTHERAPY_SHOCKS_ABORTED_RECENT: 0
MDC_IDC_STAT_TACHYTHERAPY_SHOCKS_ABORTED_TOTAL: 0
MDC_IDC_STAT_TACHYTHERAPY_SHOCKS_DELIVERED_RECENT: 0
MDC_IDC_STAT_TACHYTHERAPY_SHOCKS_DELIVERED_TOTAL: 1
MDC_IDC_STAT_TACHYTHERAPY_TOTAL_DTM_END: NORMAL
MDC_IDC_STAT_TACHYTHERAPY_TOTAL_DTM_START: NORMAL

## 2025-01-15 PROCEDURE — G0463 HOSPITAL OUTPT CLINIC VISIT: HCPCS | Performed by: INTERNAL MEDICINE

## 2025-01-15 RX ORDER — BICALUTAMIDE 50 MG/1
50 TABLET, FILM COATED ORAL DAILY
Qty: 21 TABLET | Refills: 0 | Status: SHIPPED | OUTPATIENT
Start: 2025-01-15 | End: 2025-02-05

## 2025-01-15 ASSESSMENT — PAIN SCALES - GENERAL: PAINLEVEL_OUTOF10: NO PAIN (1)

## 2025-01-15 NOTE — TELEPHONE ENCOUNTER
Device alert reviewed.  Patient at [facility] with short time interval for device change out.  Okay to proceed with generator change out within the next 2 weeks.

## 2025-01-15 NOTE — LETTER
1/15/2025      Inocente Rios  [address]  [facility] [address]      Dear Colleague,    Thank you for referring your patient, Inocente Rios, to the [facility]. Please see a copy of my visit note below.      PRIMARY CARE PHYSICIAN: Slade Parisi MD  CARDIOLOGY: Joseph Cronin MD  MEDICAL ONCOLOGY: Jorge Salmon MD  RADIATION ONCOLOGY: Derrick Brennan MD  UROLOGY: Bing Angel MD    HISTORY OF PRESENT ILLNESS: Patient is a 69-year-old male with stage IVB adenocarcinoma prostate (pT3a, pN0, cM1b, PSA 8.6 ng/mL, grade group 3).  Patient was diagnosed with stage III adenocarcinoma prostate (pT3a, pN0, cM0, PSA 8.6 ng/mL, grade group 3), and presented with an elevated PSA of 8.6 ng/mL (01/03/2020). Transrectal ultrasound-guided prostate core needle biopsy 02/27/2020, revealed a Higdon 4+3=7 adenocarcinoma involving 75% of the core needle biopsy sample from the right mid medial, 75% of the biopsy sample from the right mid lateral, 90% of the biopsy sample from the right medial base, 70% of the biopsy sample with perineural invasion from the right lateral base.  There is a Higdon 3+4=7 adenocarcinoma involving 30% of the core needle biopsy sample from the left medial apex, 50% of the biopsy sample from the left lateral apex, 25% of the biopsy sample from the left mid medial, 55% of the biopsy sample from the left medial base.  There was a Hernandez 3+3=6 adenocarcinoma involving 20% of the core needle biopsy sample from the right medial apex, 5-10% of the biopsy sample from the right lateral apex, 30% of the biopsy sample from the left mid lateral.  There was high-grade prostatic intraepithelial neoplasia with rare adjacent small atypical glands in the core needle biopsy sample from the left lateral base (11/12 biopsy samples involved). CT abdomen, pelvis 05/04/2020, was negative for adenopathy or metastases.  There was heterogeneous enhancement of the prostate with 9 mm  enhancing nodule along the right posterior aspect of the prostate.  Bone scan 05/04/2020 was negative for metastases.  There was mild uptake in the bilateral acromioclavicular joints and right knee. Patient underwent robotic-assisted laparoscopic radical prostatectomy and bilateral pelvic lymph node dissection 05/20/2020, there revealed a Tonasket 4+3=7 acinar and ductal adenocarcinoma involving 35% of the prostate with the largest focus measuring 1.5 cm.  There was nonfocal extraprostatic extension involving the right posterior prostate.  There was perineural, but no lymphovascular invasion.  There was no bladder neck invasion or seminal vesicle involvement.  Surgical margins were tumor free. Two pelvic lymph nodes were negative for metastases (0/2 lymph nodes involved).     PSA was undetectable at <0.1 ng/mL (from 08/12/2020 to 05/27/2021) with subsequent biochemical pression with PSA 0.13 ng/mL (11/11/2021), PSA 0.15 ng/mL (12/03/2021), PSA 0.35 ng/mL (02/22/2022).  Decipher Genomic Risk 12/22/2021) revealed a decipher score of 0.32 (low risk).  18-F-Fluciclovine PET/CT scan 01/10/2022, revealed postsurgical changes related to prior prostatectomy and pelvic lymph node dissection.  There were no suspicious PSMA-avid lesions.  Patient received salvage radiation therapy to the prostate fossa (6800 cGy in 34 fractions) and bilateral pelvic lymph nodes (4500 cGy in 25 fractions) from 04/18/2022 through 06/06/2022, without androgen deprivation therapy (ADT).  There was a PSA alisa of 0.1 ng/mL (12/30/2022) followed by a rising PSA of 0.2 ng/mL (03/30/2023), PSA 0.3 ng/mL (10/24/2023).  PSMA PET/CT scan 10/30/2023, was negative for PSMA-avid lesions.    There was continued biochemical progression with PSA 0.6 ng/mL (01/21/2024), PSA 0.76 ng mL (03/21/2024), PSA 0.97 ng/mL (05/02/2024).  PSMA PET/CT scan 05/24/2024, showed postsurgical changes related to prior prostatectomy.  There were no PSMA-avid metastases.  Patient  continued observation with PSA 1.1 ng mL (07/31/2024), PSA 1.92 ng/mL (10/31/2024), PSA 2.2 ng mL (12/03/2024).  PSMA PET/CT scan 12/20/2024, revealed PSMA-avid metastases in the right T6 transverse process and left L2 pedicle. Patient is referred to the [facility] for evaluation and recommendations.  Patient has not started prostate cancer-directed therapy yet.  Patient has fatigue and occasional right low back discomfort.  Patient reports impotence since prostate cancer surgery and radiation therapy.  Patient denies fever, anorexia, weight loss, headache, cough, dyspnea, chest pain, abdominal pain, nausea, vomiting, constipation, diarrhea.     PAST HISTORY:   -Hyperlipidemia.  -Coronary artery disease status post anterior myocardial infarction and mid LAD stent placement, 07/10/2015.  -History of out-of-hospital ventricular fibrillation cardiac arrest with anoxic encephalopathy 07/16/2015, with full recovery.  -Ischemic cardiomyopathy status post internal cardiac defibrillator placement, 07/22/2015.  -Asthma.  -Obstructive sleep apnea.  -Stage IVB adenocarcinoma prostate (pT3a, pN0, cM1b, PSA 8.6 ng/mL, grade group 3).  -Erectile dysfunction.  -Varicose veins  -Colon polyp. A sessile serrated adenoma was removed from the ascending colon at the time of colonoscopy, 09/11/2019.  -Depression.  -Bilateral sensorineural hearing loss.  -Laparoscopic appendectomy, 01/29/2023.  -Laparoscopic bilateral inguinal hernia repair, 11/21/2016.  -Vasectomy, 1990s.  -Bilateral varicocelectomy, 1980s.    ALLERGIES: No known drug allergies.    MEDICATIONS:   Current Outpatient Medications   Medication Sig Dispense Refill     bicalutamide (CASODEX) 50 MG tablet Take 1 tablet (50 mg) by mouth daily for 21 days. Begin at the time of the first leuprolide injection. 21 tablet 0     calcium carbonate-vitamin D (OSCAL) 500-5 MG-MCG tablet Take 1 tablet by mouth 2 times daily. 120 tablet 5     albuterol (PROAIR HFA/PROVENTIL  HFA/VENTOLIN HFA) 108 (90 Base) MCG/ACT inhaler Inhale 2 puffs into the lungs every 4 hours as needed for shortness of breath / dyspnea or wheezing 18 g 1     ASPIRIN PO Take 81 mg by mouth       atorvastatin (LIPITOR) 40 MG tablet TAKE 1 TABLET (40 MG TOTAL) BY MOUTH DAILY. 90 tablet 0     metoprolol succinate ER (TOPROL XL) 25 MG 24 hr tablet Take 1 tablet (25 mg) by mouth daily. 90 tablet 3     order for DME Equipment being ordered: Home blood pressure monitor 1 Units 0     sildenafil (VIAGRA) 100 MG tablet Take one half to one tablet as needed. 20 tablet 11     tadalafil (CIALIS) 20 MG tablet Take 10 mg by mouth twice a week         FAMILY HISTORY: Father  at age 95 after mechanical fall and had a history of prostate cancer treated with definitive radiation therapy.  Mother  age 83 of Parkinson's disease.  There are 2 sisters: Adia Bailey age 71 with osteoporosis; Jess Beard age 68 with Parkinson's disease.  There are 2 daughters: 1 daughter age 32 is alive and well; an adopted daughter age 30 is alive and well.  There is no family history of breast, ovarian, prostate, or pancreas cancer.    SOCIAL HISTORY: Patient was born in [place], [place] and raised in [place], [place].  Patient moved to [place] in .  He is  since 2024 and lives on his own in [place].  Patient is self-employed and his  service since .  Patient also worked as a  for 40 years up until .  He does not consume tobacco.  He drinks beer on occasion.  There is no  service.    Social History     Tobacco Use     Smoking status: Never     Smokeless tobacco: Never   Vaping Use     Vaping status: Never Used   Substance Use Topics     Alcohol use: Yes     Alcohol/week: 7.0 standard drinks of alcohol     Drug use: No       REVIEW OF SYSTEMS: Review of systems reviewed with the patient and otherwise negative except for those detailed above.    PHYSICAL EXAM: BP (!) 142/82 (BP  "Location: Right arm, Patient Position: Sitting, Cuff Size: Adult Regular)   Pulse 79   Temp 98.1  F (36.7  C) (Oral)   Resp 16   Ht 1.778 m (5' 10\")   Wt 72 kg (158 lb 12.8 oz)   SpO2 100%   BMI 22.79 kg/m  .  Body surface area is 1.89 meters squared. ECOG performance status: 0.   Skin: No erythema or rash.  HEENT: Sclera nonicteric.  Conjunctiva normal.  Pupils equal round reactive.  Nose clear.  Tongue and uvula midline.  Oropharynx without lesions or ulceration, mucosa pink and moist.  Neck: Supple without masses.  Nodes: No cervical, supraclavicular, axillary, or inguinal adenopathy.  Lungs: No dullness to percussion.  No rales, wheezes, rhonchi.  Heart: Regular rate and rhythm.  Abdomen: Bowel sounds present.  Soft, nontender, no hepatosplenomegaly or mass.  Extremities: No edema.  Neurologic: Sensory, motor, cerebellar normal.     IMPRESSION/PLAN: Stage IVB adenocarcinoma prostate.  Prostate cancer is a Garland 4+3=7 adenocarcinoma with perineural invasion and extraprostatic extension, without locoregional or distant metastases.  Patient underwent definitive surgery (05/20/2020).  Postoperative PSA was undetectable (<0.1 ng/mL from 08/12/2020 to 05/27/2021) with subsequent biochemical pression with PSA 0.13 ng/mL (11/11/2021), PSA 0.35 ng/mL (02/22/2022).  There was no evidence of metastases on restaging 18-F-Fluciclovine PET/CT scan (01/10/2022).  Patient received salvage radiation therapy to the prostate fossa and bilateral pelvic lymph nodes (from 04/18/2022 through 06/06/2022) without ADT.  There was a PSA alisa of 0.1 ng/mL (12/30/2022) followed by biochemical progression with PSA of 0.2 ng/mL (03/30/2023), PSA 0.3 ng/mL (10/24/2023).  PSA 0.76 ng mL (03/21/2024), PSA 0.97 ng/mL (05/02/2024), PSA 1.1 ng mL (07/31/2024), PSA 1.92 ng/mL (10/31/2024), PSA 2.2 ng mL (12/03/2024).  There was no evidence of metastases on PSMA PET/CT scan (10/30/2023, 05/24/2024) with subsequent detection of skeletal " metastases to the right T6 transverse process and left L2 pedicle detected on repeat PSMA PET/CT scan (12/20/2024).  There is a PSA doubling time of 3-4 months.  Frontline therapy for low-volume metastatic hormone sensitive prostate cancer would consist of ADT consisting of leuprolide every 3 months plus an androgen receptor pathway inhibitor such as abiraterone (LATITUDE clinical trial, N Engl J Med 2017;377:352-360), enzalutamide (ENZAMET clinical trial, N Engl J Med 2019;381:121-131), or apalutamide (TITAN clinical trial, N Engl J Med 2019;381(1):13-24).  Stereotactic ablative radiation therapy (SABR) may be used to treat the oligometastatic skeletal lesions for a more high-quality remission.  Patient does not appear to be eligible for the phase 3 ARASTEP clinical trial consisting of darolutamide plus ADT versus placebo plus ADT and patient with high risk biochemical recurrence of prostate cancer due to the presence of ductal component in the prostate adenocarcinoma.  Patient might be eligible for the phase 2 ARACOG clinical trial evaluating ADT plus darolutamide for enzalutamide and the evaluation of therapy on cognitive function, however patient would need to be on ADT with adequate suppression of testosterone for eligibility.  Patient wishes to proceed with first-line ADT consisting of leuprolide 22.5 mg every 3 months and enzalutamide 160 mg daily.  Patient will also proceed with SABR to the oligometastatic skeletal lesions, and he is set up to begin radiation therapy shortly.  I reviewed the risks and side effects of enzalutamide with the patient, which include fatigue, weakness, flushing, anorexia, weight loss, headache, dizziness, change in taste sensation, breast tenderness and swelling, constipation, diarrhea, peripheral edema, myalgias, arthralgias, falls, forgetfulness, depression, hypertension, and seizures.  Leuprolide is associated with fatigue, hot flashes, weakness, loss of muscle mass, weight  gain, forgetfulness, depression, irritability breast enlargement, loss of libido, impotence, joint stiffness and pain, coronary artery disease, osteoporosis, and bone fracture.  Patient understood the indication and risks and agreed to proceed. Bicalutamide 50 mg daily x21 days will be initiated at the time of the first leuprolide injection.  Thereafter bicalutamide will be replaced by enzalutamide.  Bone density scan will be performed to evaluate for osteopenia/osteoporosis, and patient will begin calcium plus vitamin D supplementation.  Denosumab 60 mg every 6 months is also recommended while on ADT.  Patient has stage IV prostate cancer and germline cancer gene mutation panel ([employer]ae), and somatic next generation sequencing ([facility]) on the prostatectomy sample (05/20/2020) will be performed to evaluate for mutations and biomarkers that can direct effective therapies.  Patient will return to clinic in 6 weeks with CBC, metabolic panel, PSA, testosterone. The current and past history, clinical evaluation, reviewing diagnostic tests and imaging with the patient, and assessment, complex management of androgen deprivation therapy and androgen receptor pathway inhibitor toxicities, and planning occurred over 60 minutes.       Александр Chavez MD    cc: MD Joseph Ellington MD Robert Delaune, MD Ryan K Funk, MD Basir U Tareen, MD      Again, thank you for allowing me to participate in the care of your patient.        Sincerely,        Александр Chavez MD    Electronically signed

## 2025-01-15 NOTE — TELEPHONE ENCOUNTER
----- Message from Jamaica Licea sent at 1/15/2025  6:38 AM CST -----  Regarding: Device RN Review  Encounter Type: Alert remote ICD transmission for low voltage, courtesy check  Device: [employer]ogen (S)   Pacing %/Programmed:  0% @ VVI 40 bpm   Lead(s): Stable measurements and trends.   Battery longevity: 7 years, 6 months estimated-Estimate is inaccurate due to voltage alert.  Presenting: VS 75 bpm   Atrial high rates: N/A No RA lead.  Anticoagulant: None. Takes ASA.  Ventricular High rates: Since 1/10/2025 none detected.  Comments: Normal device function. Voltage too low for projected remaining capacity.   Plan: Routed to device RN for review. Greg, Device Specialist    Alert for low voltage noted today.  Call placed to [employer]-support and spoke with Agusto.  States device will most likely need to be changed out due to low voltage warning as something is draining the battery more than usual.  The team will have the [employer] engineering team analyzed the battery and will get back to us with a timeframe for change out.     Device will be sounding Alert tones 16 beeps, every 6 hours.  The device can be interrogated in clinic which will reset the beeping temporarily for approximately 3 days, or you can shut off the beeper until change out but need to keep in mind this will shut off all beeping and warnings for the patient.     Call placed to patient and reviewed all of the above.  Patient verbalized understanding and is aware that I will call him back as soon as I have the timeframe for change out once the engineering team has analyzed the data.      Patient states he has been recently diagnosed with prostate cancer that has spread to his spine and they are looking to do radiation treatments soon.  He has a consult for second opinion this afternoon with the [facility]-advised to mention the need for device change out at appointment today to see how this will interact with  possible radiation plan.     Patient verbalized understanding.  Denies any other questions or concerns at this time.     Kylee Sloan RN

## 2025-01-15 NOTE — TELEPHONE ENCOUNTER
Email received regarding patient's Low Voltage alert back from [employer] support. (Model/Serial number verified/highlighted).    Call placed to patient to review findings. No Answer LVM for callback to discuss change out. Patient may not be back from Oncology consults yet. Per Onc OV notes appears patient will be started Hormone therapy for prostate CA treatment and not Radiation at this time, will confirm with patient when he calls back.     In meantime will forward to EP team for urgent review and change out planning for within next 14 days.     Kylee Sloan RN      Heart Care Device Change-Out Checklist (MAHAD Checklist)    Device Data  ICD and Single    :  [employer]  Model:  D140  Serial Number:  [id number]  Implant location: Left Chest    Implant Date: 7/22/2015  MAHAD/ALERT Date:  1/15/2025   Device Diagnosis:  Ventricular Fibrillation; Secondary prevention; CAD     Device Alert(s):  No    Not currently under advisory but did receive an alert for low voltage capacity. Reviewed with [employer] [facility], device should be changed out within 14 days    Lead Data  (Attach Device History)      Last Interrogation Date: 4/1/2024        Right Ventricle: [employer] [address] [place] 4-Site SG   Lead Imp:  767Ohms, Pacing Threshold:  1.5V@ 0.4ms, and Sensing Threshold:  10.6 mV   Shock Imp: 70 Ohms     Old Leads Present/Abandoned: No    Lead Alert(s):  No    Lead Issues/Concerns: None    Diagnostic Information  Intrinsic Rhythm:  Sinus Rhythm HR 60     Atrial Fibrillation:  No  Takes Anticoagulant or LAAO? No    Pacing Percentages  Ventricle Pacing 0%  Pacemaker Dependent? No    History of VT/VF therapy    ATP: No  Appropriate Shocks:  N/a     Ejection Fraction  Last EF Date:  11/19/2019    By Stress Test  Last EF Measurement:  62%      Special Instructions/Timeframe for change-out:  Needs change out in 14 days or less due to Low Voltage Alert (confirmed with [facility]).     Routed to EP:   Dr. Lundberg (Device reader)     Device RN: Kylee Sloan RN

## 2025-01-15 NOTE — NURSING NOTE
"Oncology Rooming Note    January 15, 2025 2:59 PM   Inocente Rios is a 69 year old male who presents for:    Chief Complaint   Patient presents with    Oncology Clinic Visit     Prostate cancer     Initial Vitals: There were no vitals taken for this visit. Estimated body mass index is 22.96 kg/m  as calculated from the following:    Height as of 1/28/23: 1.778 m (5' 10\").    Weight as of 7/29/24: 72.6 kg (160 lb). There is no height or weight on file to calculate BSA.  No Pain (1) Comment: Data Unavailable   No LMP for male patient.  Allergies reviewed: Yes  Medications reviewed: Yes    Medications: Medication refills not needed today.  Pharmacy name entered into [facility]:    [facility] - [place], [place] - [address]  [id number] - [place], [place] - [address]    Frailty Screening:   Is the patient here for a new oncology consult visit in cancer care? 1. Yes. Over the past month, have you experienced difficulty or required a caregiver to assist with:   1. Balance, walking or general mobility (including any falls)? NO  2. Completion of self-care tasks such as bathing, dressing, toileting, grooming/hygiene?  NO  3. Concentration or memory that affects your daily life?  NO       Clinical concerns: none      Mahi Huizar              "

## 2025-01-16 ENCOUNTER — TELEPHONE (OUTPATIENT)
Dept: ONCOLOGY | Facility: CLINIC | Age: 70
End: 2025-01-16
Payer: MEDICARE

## 2025-01-16 ENCOUNTER — DOCUMENTATION ONLY (OUTPATIENT)
Dept: CARDIOLOGY | Facility: CLINIC | Age: 70
End: 2025-01-16
Payer: MEDICARE

## 2025-01-16 ENCOUNTER — PREP FOR PROCEDURE (OUTPATIENT)
Dept: CARDIOLOGY | Facility: CLINIC | Age: 70
End: 2025-01-16
Payer: MEDICARE

## 2025-01-16 DIAGNOSIS — Z95.810 ICD (IMPLANTABLE CARDIOVERTER-DEFIBRILLATOR) IN PLACE: Primary | ICD-10-CM

## 2025-01-16 DIAGNOSIS — Z45.02 ICD (IMPLANTABLE CARDIOVERTER-DEFIBRILLATOR) BATTERY DEPLETION: ICD-10-CM

## 2025-01-16 RX ORDER — DEXMEDETOMIDINE HYDROCHLORIDE 4 UG/ML
.1-1.5 INJECTION, SOLUTION INTRAVENOUS CONTINUOUS
OUTPATIENT
Start: 2025-01-16

## 2025-01-16 RX ORDER — SODIUM CHLORIDE 9 MG/ML
100 INJECTION, SOLUTION INTRAVENOUS CONTINUOUS
OUTPATIENT
Start: 2025-01-16

## 2025-01-16 RX ORDER — VANCOMYCIN HYDROCHLORIDE 1 G/200ML
1000 INJECTION, SOLUTION INTRAVENOUS
OUTPATIENT
Start: 2025-01-16

## 2025-01-16 RX ORDER — LIDOCAINE 40 MG/G
CREAM TOPICAL
OUTPATIENT
Start: 2025-01-16

## 2025-01-16 RX ORDER — FENTANYL CITRATE 50 UG/ML
25 INJECTION, SOLUTION INTRAMUSCULAR; INTRAVENOUS
OUTPATIENT
Start: 2025-01-16

## 2025-01-16 NOTE — TELEPHONE ENCOUNTER
Prior Authorization Approval    Medication: XTANDI 40 MG PO TABS  Authorization Effective Date: 1/6/2025  Authorization Expiration Date:  UFN  Approved Dose/Quantity: 60/30  Reference #: [id number]   Insurance Company: [employer] - Phone [phone] Fax [phone]  Expected CoPay: $ 2,000  CoPay Card Available:      Financial Assistance Needed:  Yes SHERYL Alves  Which Pharmacy is filling the prescription: [facility] - [place] [address]   Pharmacy Notified: yes  Patient Notified: yes            Oleksandr Cuadra CPhT  [facility] [place] [facility]  Oncology Pharmacy Liaison II  [email or web address]  [phone] (phone  [phone] (fax

## 2025-01-16 NOTE — PROGRESS NOTES
AC: None- NA Diuretics: None  DM Meds: None  GLP-1:None     Inocente Rios 1955 [id number]  Home:[phone] (home) Cell:[phone] (mobile)  Emergency Contact: Jess Rios [phone]  PCP: Slade Parisi, [phone]      Important patient information for CSC/Cath Lab staff : None    [facility] EP Cath Lab Procedure Order     Device Implant/Revision:  Procedure: Generator Change Out  Current Device/Device Co Needed for Procedure: [employer] Single ICD   Ordering Provider: Dr Lundberg (Generator Changes can be scheduled with any provider)  Date Ordered and Prepped: 1/16/2025 aJmaica Cartrwight RN  Diagnosis:  Device Advisory/Malfunction  Scheduling Timeframe:   Within 2 weeks  Scheduling Restrictions: None  Scheduling Contact: Please contact pt to schedule, if you are unable to schedule date within the next 24 hours please contact pt to update on scheduling process  Cardiology Follow Up Apt s/p:  Standard Device follow up General Card @ 6mo (does not include New CRT/CSP/HIS)  Pre-Procedural Testing needed: None  Anesthesia:  Conscious Sedation- CV RN to administer    [facility] EP Cath Lab Prep   H&P:  Pt to schedule with PMD to complete  Pre-Procedure Labs/T&S: For SICD & MICRA Devices only schedule lab visit at [facility] lab within 3 days prior to procedure for T&S, BMP, CBC, HcG is appropriate, and INR if on warfarin. All other Devices pre-procedure lab work will be done the morning of the procedure.  Medical Records Pertinent for Procedure: None  Iodinated Contrast Dye Allergies (Does not include Shellfish, Egg, and/or Iodine Allergy)- excludes ILR/SICD:None  Renal Protocol (GFR < 40ml/min, IV contrast past 2 days, EF < or = 25%)- excludes ILR/SICD: No  GLP-1 Protocol: If on Dulaglutide (Trulicity) (weekly)- Injection hold 7 days prior to procedure  , Exenatide extended release (Bydureon bcise) (weekly)- Injection hold 7 days prior to procedure, Exenatide (Byetta) (twice daily)- Oral Tablet hold day prior and  morning of procedure and for Injection hold 7 days prior to procedure, Semaglutide (Ozempic) (weekly)- Injection and Oral hold 7 days prior to procedure, Liraglutide (Victoza, Saxenda) (daily)- Injection hold day prior and morning of procedure    No Known Allergies    Current Outpatient Medications:     albuterol (PROAIR HFA/PROVENTIL HFA/VENTOLIN HFA) 108 (90 Base) MCG/ACT inhaler, Inhale 2 puffs into the lungs every 4 hours as needed for shortness of breath / dyspnea or wheezing, Disp: 18 g, Rfl: 1    ASPIRIN PO, Take 81 mg by mouth, Disp: , Rfl:     atorvastatin (LIPITOR) 40 MG tablet, TAKE 1 TABLET (40 MG TOTAL) BY MOUTH DAILY., Disp: 90 tablet, Rfl: 0    bicalutamide (CASODEX) 50 MG tablet, Take 1 tablet (50 mg) by mouth daily for 21 days. Begin at the time of the first leuprolide injection., Disp: 21 tablet, Rfl: 0    calcium carbonate-vitamin D (OSCAL) 500-5 MG-MCG tablet, Take 1 tablet by mouth 2 times daily., Disp: 120 tablet, Rfl: 5    metoprolol succinate ER (TOPROL XL) 25 MG 24 hr tablet, Take 1 tablet (25 mg) by mouth daily., Disp: 90 tablet, Rfl: 3    order for DME, Equipment being ordered: Home blood pressure monitor, Disp: 1 Units, Rfl: 0    sildenafil (VIAGRA) 100 MG tablet, Take one half to one tablet as needed., Disp: 20 tablet, Rfl: 11    tadalafil (CIALIS) 20 MG tablet, Take 10 mg by mouth twice a week, Disp: , Rfl:     Documentation Date:1/16/2025 8:49 AM  Jamaica Cartwright RN

## 2025-01-16 NOTE — TELEPHONE ENCOUNTER
Patient called back, we discussed the recommendations for change out within 14 days per [employer] guidelines. 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 Reviewed incision care and that there really aren't any arm restrictions with a generator change out since no new leads are being placed. Discussed precautions with incision/pacer site and possible swelling/soreness etc.     Reviewed that the procedure schedule team will be contacting him to set up date/time. Advised to call with any other questions/concerns.     Kylee Sloan RN

## 2025-01-16 NOTE — TELEPHONE ENCOUNTER
PA Initiation    Medication: XTANDI 40 MG PO TABS  Insurance Company: [employer] - Phone [phone] Fax [phone]    Start Date: 1/16/2025    Oleksandr Cuadra CPhT  [facility]  Oncology Pharmacy Liaison II  [email or web address]  [phone] (phone  [phone] (fax

## 2025-01-16 NOTE — TELEPHONE ENCOUNTER
EP MD/EP NC MAHAD Review  Dr Lundberg reviewed MAHAD checklist  Reviewed by [place] Cartwright, RN 1/16/2025 8:53 AM    Pt aware of above orders and Order for procedure placed in EPIC and  aware

## 2025-01-21 ENCOUNTER — TELEPHONE (OUTPATIENT)
Dept: CARDIOLOGY | Facility: CLINIC | Age: 70
End: 2025-01-21
Payer: MEDICARE

## 2025-01-21 NOTE — TELEPHONE ENCOUNTER
Pre-Procedure Education    Procedure: ICD Generator Change with Dr Lundberg on 1/23 with arrival time 12:00 pm    Orders: Orderset for procedure verified signed/held    COVID: COVID policy- if pt develops COVID like symptoms prior to procedure, he/she would need to complete an at home with a rapid antigen COVID test 1-2 days prior to your procedure date. If COVID + pt is aware the procedure will need to be rescheduled, and to contact CV scheduling as soon as possible    Pre-Op H&P: Will need to verify that pt has pre-op scheduled with PMD, as apt/record of pre-op not listed in EPIC- Request will be/was placed to obtain records, and scan into Media upon completion    Education:   Contact: Attempted to contact pt via phone, VM was left with request that pt return call to review above/below information  Pre-Procedure Instruction: NPO after midnight pre procedure, Defined NPO with pt, Remove all jewelry and leave all valuables at home, Shower prior to arrival, Sedation plan/orders, Transportation requirements and arrangements post procedure, Post-procedure follow up process, Post-procedure restrictions/expectations, and Pre-procedure letter sent- letter tab  Risks:  Internal Cardiac Defibrillator (ICD) Risks  1% Pneumothorax  1-2% Infection, Pocket erosion  1-2% Major bleeding, Hematoma (increased with anticoagulation therapy); 5-10% Minor bleeding  1-2% Acute subclavian vein thrombosis, 10% Chronic subclavian vein thrombosis  < 1% Cardiac perforation, Cardiac tamponade, Arrythmias, Diaphragmatic stimulation  < 2% Lead dislodgment, < 1% Lead fracture or Generator malfunction  < 0.1% Death  < 2% Tricuspid valve dysfunction  If external defibrillation is needed, 25% risk for superficial skin irritation  < 5% Need for ICD system revision  5% Inappropriate shock  Risks associated with general anesthesia will be addressed by the Anesthesiology Department.  Risk for electromagnetic interference (MICHAEL), psychosocial aspects of  having an ICD, and limitations to operating a motor vehicle as directed by the electrophysiologist dependent on specific patient criteria.      Medication:   Instructions regarding anticoagulants: Pt not on AC- N/A  Instructions regarding antiarrhythmic medication: N/A for this type of procedure; N/A  Instructions given to pt regarding diuretics medication: None  Instructions given to pt regarding DM/GLP-1 medication:   DM- None  GLP-1- None  Instructions for medication, other than anticoagulants and antiarrhythmics listed above, given to pt: Take all medication AM of procedure with small sips of water     Important patient information for staff: None    1/21/2025 9:08 AM  Jamaica Monique RN

## 2025-01-22 ENCOUNTER — ANESTHESIA EVENT (OUTPATIENT)
Dept: CARDIOLOGY | Facility: HOSPITAL | Age: 70
End: 2025-01-22
Payer: MEDICARE

## 2025-01-22 NOTE — TELEPHONE ENCOUNTER
2nd Attempt to contact pt, voicemail message was left with contact information and instructing pt to call back.  1/22/2025 11:55 AM  J[place] Cartwright RN

## 2025-01-22 NOTE — TELEPHONE ENCOUNTER
Pt returned phone call and reviewed instructions. Pt verbalized understanding.     Pt has Pre-op completed at [facility] off [address]. Called [facility] and they confirmed H&P completed on 1/20 and will fax over now.     Radha Rodrigues RN

## 2025-01-23 ENCOUNTER — HOSPITAL ENCOUNTER (OUTPATIENT)
Facility: HOSPITAL | Age: 70
Discharge: HOME | End: 2025-01-23
Attending: INTERNAL MEDICINE | Admitting: INTERNAL MEDICINE
Payer: MEDICARE

## 2025-01-23 ENCOUNTER — ANESTHESIA (OUTPATIENT)
Dept: CARDIOLOGY | Facility: HOSPITAL | Age: 70
End: 2025-01-23
Payer: MEDICARE

## 2025-01-23 VITALS
BODY MASS INDEX: 24.25 KG/M2 | OXYGEN SATURATION: 100 % | RESPIRATION RATE: 19 BRPM | HEART RATE: 65 BPM | TEMPERATURE: 97.9 F | HEIGHT: 68 IN | WEIGHT: 160 LBS | SYSTOLIC BLOOD PRESSURE: 131 MMHG | DIASTOLIC BLOOD PRESSURE: 80 MMHG

## 2025-01-23 DIAGNOSIS — Z45.02 ICD (IMPLANTABLE CARDIOVERTER-DEFIBRILLATOR) BATTERY DEPLETION: ICD-10-CM

## 2025-01-23 DIAGNOSIS — Z95.810 ICD (IMPLANTABLE CARDIOVERTER-DEFIBRILLATOR) IN PLACE: ICD-10-CM

## 2025-01-23 LAB
ANION GAP SERPL CALCULATED.3IONS-SCNC: 8 MMOL/L (ref 7–15)
BUN SERPL-MCNC: 17.1 MG/DL (ref 8–23)
CALCIUM SERPL-MCNC: 9 MG/DL (ref 8.8–10.4)
CHLORIDE SERPL-SCNC: 108 MMOL/L (ref 98–107)
CREAT SERPL-MCNC: 1.1 MG/DL (ref 0.67–1.17)
EGFRCR SERPLBLD CKD-EPI 2021: 73 ML/MIN/1.73M2
ERYTHROCYTE [DISTWIDTH] IN BLOOD BY AUTOMATED COUNT: 12.8 % (ref 10–15)
GLUCOSE SERPL-MCNC: 94 MG/DL (ref 70–99)
HCO3 SERPL-SCNC: 25 MMOL/L (ref 22–29)
HCT VFR BLD AUTO: 39.2 % (ref 40–53)
HGB BLD-MCNC: 13.3 G/DL (ref 13.3–17.7)
MCH RBC QN AUTO: 30.7 PG (ref 26.5–33)
MCHC RBC AUTO-ENTMCNC: 33.9 G/DL (ref 31.5–36.5)
MCV RBC AUTO: 91 FL (ref 78–100)
PLATELET # BLD AUTO: 187 10E3/UL (ref 150–450)
POTASSIUM SERPL-SCNC: 4.3 MMOL/L (ref 3.4–5.3)
RBC # BLD AUTO: 4.33 10E6/UL (ref 4.4–5.9)
SODIUM SERPL-SCNC: 141 MMOL/L (ref 135–145)
WBC # BLD AUTO: 3.9 10E3/UL (ref 4–11)

## 2025-01-23 PROCEDURE — 33240 INSRT PULSE GEN W/SINGL LEAD: CPT | Performed by: INTERNAL MEDICINE

## 2025-01-23 PROCEDURE — C1721 AICD, DUAL CHAMBER: HCPCS | Performed by: INTERNAL MEDICINE

## 2025-01-23 PROCEDURE — 85041 AUTOMATED RBC COUNT: CPT | Performed by: INTERNAL MEDICINE

## 2025-01-23 PROCEDURE — 250N000013 HC RX MED GY IP 250 OP 250 PS 637: Performed by: INTERNAL MEDICINE

## 2025-01-23 PROCEDURE — 80048 BASIC METABOLIC PNL TOTAL CA: CPT | Performed by: INTERNAL MEDICINE

## 2025-01-23 PROCEDURE — 258N000003 HC RX IP 258 OP 636: Performed by: NURSE ANESTHETIST, CERTIFIED REGISTERED

## 2025-01-23 PROCEDURE — 250N000009 HC RX 250: Performed by: NURSE ANESTHETIST, CERTIFIED REGISTERED

## 2025-01-23 PROCEDURE — 258N000003 HC RX IP 258 OP 636: Performed by: INTERNAL MEDICINE

## 2025-01-23 PROCEDURE — 272N000001 HC OR GENERAL SUPPLY STERILE: Performed by: INTERNAL MEDICINE

## 2025-01-23 PROCEDURE — 33262 RMVL& REPLC PULSE GEN 1 LEAD: CPT | Performed by: INTERNAL MEDICINE

## 2025-01-23 PROCEDURE — 250N000009 HC RX 250: Performed by: INTERNAL MEDICINE

## 2025-01-23 PROCEDURE — 85018 HEMOGLOBIN: CPT | Performed by: INTERNAL MEDICINE

## 2025-01-23 PROCEDURE — 250N000011 HC RX IP 250 OP 636: Performed by: NURSE ANESTHETIST, CERTIFIED REGISTERED

## 2025-01-23 PROCEDURE — 36415 COLL VENOUS BLD VENIPUNCTURE: CPT | Performed by: INTERNAL MEDICINE

## 2025-01-23 PROCEDURE — 250N000011 HC RX IP 250 OP 636: Performed by: INTERNAL MEDICINE

## 2025-01-23 PROCEDURE — 370N000017 HC ANESTHESIA TECHNICAL FEE, PER MIN: Performed by: INTERNAL MEDICINE

## 2025-01-23 DEVICE — ICD RESONATE HF DR DF4 D532: Type: IMPLANTABLE DEVICE | Site: CHEST  WALL | Status: FUNCTIONAL

## 2025-01-23 RX ORDER — PROPOFOL 10 MG/ML
INJECTION, EMULSION INTRAVENOUS CONTINUOUS PRN
Status: DISCONTINUED | OUTPATIENT
Start: 2025-01-23 | End: 2025-01-23

## 2025-01-23 RX ORDER — LIDOCAINE 40 MG/G
CREAM TOPICAL
Status: DISCONTINUED | OUTPATIENT
Start: 2025-01-23 | End: 2025-01-23 | Stop reason: HOSPADM

## 2025-01-23 RX ORDER — HYDROMORPHONE HCL IN WATER/PF 6 MG/30 ML
0.2 PATIENT CONTROLLED ANALGESIA SYRINGE INTRAVENOUS EVERY 5 MIN PRN
Status: DISCONTINUED | OUTPATIENT
Start: 2025-01-23 | End: 2025-01-23 | Stop reason: HOSPADM

## 2025-01-23 RX ORDER — FENTANYL CITRATE 50 UG/ML
50 INJECTION, SOLUTION INTRAMUSCULAR; INTRAVENOUS EVERY 5 MIN PRN
Status: DISCONTINUED | OUTPATIENT
Start: 2025-01-23 | End: 2025-01-23 | Stop reason: HOSPADM

## 2025-01-23 RX ORDER — ACETAMINOPHEN 325 MG/1
650 TABLET ORAL EVERY 4 HOURS PRN
Status: DISCONTINUED | OUTPATIENT
Start: 2025-01-23 | End: 2025-01-23 | Stop reason: HOSPADM

## 2025-01-23 RX ORDER — VANCOMYCIN HYDROCHLORIDE 1 G/200ML
1000 INJECTION, SOLUTION INTRAVENOUS
Status: COMPLETED | OUTPATIENT
Start: 2025-01-23 | End: 2025-01-23

## 2025-01-23 RX ORDER — OXYCODONE HYDROCHLORIDE 5 MG/1
10 TABLET ORAL
Status: DISCONTINUED | OUTPATIENT
Start: 2025-01-23 | End: 2025-01-23 | Stop reason: HOSPADM

## 2025-01-23 RX ORDER — ONDANSETRON 8 MG/1
8 TABLET, FILM COATED ORAL EVERY 8 HOURS PRN
Status: DISCONTINUED | OUTPATIENT
Start: 2025-01-23 | End: 2025-01-23 | Stop reason: HOSPADM

## 2025-01-23 RX ORDER — ONDANSETRON 2 MG/ML
4 INJECTION INTRAMUSCULAR; INTRAVENOUS EVERY 30 MIN PRN
Status: DISCONTINUED | OUTPATIENT
Start: 2025-01-23 | End: 2025-01-23 | Stop reason: HOSPADM

## 2025-01-23 RX ORDER — SODIUM CHLORIDE 9 MG/ML
100 INJECTION, SOLUTION INTRAVENOUS CONTINUOUS
Status: DISCONTINUED | OUTPATIENT
Start: 2025-01-23 | End: 2025-01-23 | Stop reason: HOSPADM

## 2025-01-23 RX ORDER — PROPOFOL 10 MG/ML
INJECTION, EMULSION INTRAVENOUS PRN
Status: DISCONTINUED | OUTPATIENT
Start: 2025-01-23 | End: 2025-01-23

## 2025-01-23 RX ORDER — OXYCODONE HYDROCHLORIDE 5 MG/1
10 TABLET ORAL EVERY 4 HOURS PRN
Status: DISCONTINUED | OUTPATIENT
Start: 2025-01-23 | End: 2025-01-23 | Stop reason: HOSPADM

## 2025-01-23 RX ORDER — ONDANSETRON 2 MG/ML
INJECTION INTRAMUSCULAR; INTRAVENOUS PRN
Status: DISCONTINUED | OUTPATIENT
Start: 2025-01-23 | End: 2025-01-23

## 2025-01-23 RX ORDER — NALOXONE HYDROCHLORIDE 0.4 MG/ML
0.1 INJECTION, SOLUTION INTRAMUSCULAR; INTRAVENOUS; SUBCUTANEOUS
Status: DISCONTINUED | OUTPATIENT
Start: 2025-01-23 | End: 2025-01-23 | Stop reason: HOSPADM

## 2025-01-23 RX ORDER — ONDANSETRON 4 MG/1
4 TABLET, ORALLY DISINTEGRATING ORAL EVERY 30 MIN PRN
Status: DISCONTINUED | OUTPATIENT
Start: 2025-01-23 | End: 2025-01-23 | Stop reason: HOSPADM

## 2025-01-23 RX ORDER — ONDANSETRON 2 MG/ML
4 INJECTION INTRAMUSCULAR; INTRAVENOUS EVERY 6 HOURS PRN
Status: DISCONTINUED | OUTPATIENT
Start: 2025-01-23 | End: 2025-01-23 | Stop reason: HOSPADM

## 2025-01-23 RX ORDER — SODIUM CHLORIDE 9 MG/ML
INJECTION, SOLUTION INTRAVENOUS CONTINUOUS PRN
Status: DISCONTINUED | OUTPATIENT
Start: 2025-01-23 | End: 2025-01-23

## 2025-01-23 RX ORDER — FENTANYL CITRATE 50 UG/ML
25 INJECTION, SOLUTION INTRAMUSCULAR; INTRAVENOUS EVERY 5 MIN PRN
Status: DISCONTINUED | OUTPATIENT
Start: 2025-01-23 | End: 2025-01-23 | Stop reason: HOSPADM

## 2025-01-23 RX ORDER — SODIUM CHLORIDE, SODIUM LACTATE, POTASSIUM CHLORIDE, CALCIUM CHLORIDE 600; 310; 30; 20 MG/100ML; MG/100ML; MG/100ML; MG/100ML
INJECTION, SOLUTION INTRAVENOUS CONTINUOUS
Status: DISCONTINUED | OUTPATIENT
Start: 2025-01-23 | End: 2025-01-23 | Stop reason: HOSPADM

## 2025-01-23 RX ORDER — HYDROMORPHONE HCL IN WATER/PF 6 MG/30 ML
0.4 PATIENT CONTROLLED ANALGESIA SYRINGE INTRAVENOUS EVERY 5 MIN PRN
Status: DISCONTINUED | OUTPATIENT
Start: 2025-01-23 | End: 2025-01-23 | Stop reason: HOSPADM

## 2025-01-23 RX ORDER — OXYCODONE HYDROCHLORIDE 5 MG/1
5 TABLET ORAL EVERY 4 HOURS PRN
Status: DISCONTINUED | OUTPATIENT
Start: 2025-01-23 | End: 2025-01-23 | Stop reason: HOSPADM

## 2025-01-23 RX ORDER — DEXAMETHASONE SODIUM PHOSPHATE 4 MG/ML
4 INJECTION, SOLUTION INTRA-ARTICULAR; INTRALESIONAL; INTRAMUSCULAR; INTRAVENOUS; SOFT TISSUE
Status: DISCONTINUED | OUTPATIENT
Start: 2025-01-23 | End: 2025-01-23 | Stop reason: HOSPADM

## 2025-01-23 RX ORDER — OXYCODONE HYDROCHLORIDE 5 MG/1
5 TABLET ORAL
Status: DISCONTINUED | OUTPATIENT
Start: 2025-01-23 | End: 2025-01-23 | Stop reason: HOSPADM

## 2025-01-23 RX ADMIN — ONDANSETRON 4 MG: 2 INJECTION INTRAMUSCULAR; INTRAVENOUS at 14:54

## 2025-01-23 RX ADMIN — SODIUM CHLORIDE 100 ML/HR: 9 INJECTION, SOLUTION INTRAVENOUS at 12:37

## 2025-01-23 RX ADMIN — DEXMEDETOMIDINE HYDROCHLORIDE 4 MCG: 100 INJECTION, SOLUTION INTRAVENOUS at 14:06

## 2025-01-23 RX ADMIN — SODIUM CHLORIDE: 9 INJECTION, SOLUTION INTRAVENOUS at 13:45

## 2025-01-23 RX ADMIN — PROPOFOL 40 MG: 10 INJECTION, EMULSION INTRAVENOUS at 13:50

## 2025-01-23 RX ADMIN — PROPOFOL 30 MG: 10 INJECTION, EMULSION INTRAVENOUS at 14:06

## 2025-01-23 RX ADMIN — ACETAMINOPHEN 650 MG: 325 TABLET ORAL at 16:46

## 2025-01-23 RX ADMIN — PROPOFOL 75 MCG/KG/MIN: 10 INJECTION, EMULSION INTRAVENOUS at 13:50

## 2025-01-23 RX ADMIN — PHENYLEPHRINE HYDROCHLORIDE 100 MCG: 10 INJECTION INTRAVENOUS at 15:03

## 2025-01-23 RX ADMIN — SODIUM CHLORIDE: 9 INJECTION, SOLUTION INTRAVENOUS at 13:50

## 2025-01-23 RX ADMIN — PHENYLEPHRINE HYDROCHLORIDE 100 MCG: 10 INJECTION INTRAVENOUS at 14:32

## 2025-01-23 RX ADMIN — VANCOMYCIN HYDROCHLORIDE 1000 MG: 1 INJECTION, POWDER, LYOPHILIZED, FOR SOLUTION INTRAVENOUS at 14:47

## 2025-01-23 RX ADMIN — VANCOMYCIN HYDROCHLORIDE 1000 MG: 1 INJECTION, SOLUTION INTRAVENOUS at 12:38

## 2025-01-23 RX ADMIN — PHENYLEPHRINE HYDROCHLORIDE 100 MCG: 10 INJECTION INTRAVENOUS at 14:39

## 2025-01-23 ASSESSMENT — ACTIVITIES OF DAILY LIVING (ADL)
ADLS_ACUITY_SCORE: 47

## 2025-01-23 NOTE — ANESTHESIA CARE TRANSFER NOTE
Patient: Inocente Rios    Procedure: Procedure(s):  Implantable Cardioverter Defibrillator Generator Replacement Single       Diagnosis: MAHAD  Diagnosis Additional Information: No value filed.    Anesthesia Type:   MAC     Note:    Oropharynx: oropharynx clear of all foreign objects  Level of Consciousness: drowsy  Oxygen Supplementation: room air    Independent Airway: airway patency satisfactory and stable  Dentition: dentition unchanged  Vital Signs Stable: post-procedure vital signs reviewed and stable  Report to RN Given: handoff report given  Patient transferred to: Cardiac Special Care          Vitals:  Vitals Value Taken Time   /65    Temp 97.6    Pulse 63    Resp 15    SpO2 99        Electronically Signed By: SAYRA Mccullough CRNA  January 23, 2025  3:13 PM

## 2025-01-23 NOTE — INTERVAL H&P NOTE
I have reviewed the surgical (or preoperative) H&P that is linked to this encounter, and examined the patient. There are no significant changes    Clinical Conditions Present on Arrival:  Clinically Significant Risk Factors Present on Admission          # Hyperchloremia: Highest Cl = 108 mmol/L in last 2 days, will monitor as appropriate             # Drug Induced Platelet Defect: home medication list includes an antiplatelet medication

## 2025-01-23 NOTE — ANESTHESIA POSTPROCEDURE EVALUATION
Patient: Inocente Rios    Procedure: Procedure(s):  Implantable Cardioverter Defibrillator Generator Replacement Single       Anesthesia Type:  MAC    Note:  Disposition: Inpatient   Postop Pain Control: Uneventful            Sign Out: Well controlled pain   PONV:    Neuro/Psych: Uneventful            Sign Out: Acceptable/Baseline neuro status   Airway/Respiratory: Uneventful            Sign Out: Acceptable/Baseline resp. status   CV/Hemodynamics: Uneventful            Sign Out: Acceptable CV status; No obvious hypovolemia; No obvious fluid overload   Other NRE: NONE   DID A NON-ROUTINE EVENT OCCUR?            Last vitals:  Vitals Value Taken Time   /69 01/23/25 1530   Temp     Pulse 53 01/23/25 1540   Resp 13 01/23/25 1540   SpO2 99 % 01/23/25 1540   Vitals shown include unfiled device data.    Electronically Signed By: Franny Arndt MD  January 23, 2025  3:41 PM

## 2025-01-23 NOTE — DISCHARGE INSTRUCTIONS
Electrophysiology  Discharge Instructions for Defibrillator Generator Change Out     You may shower in 3 days.     Contact the [facility] at [phone] should you develop redness, swelling or drainage of the incision area.    You may drive in 24 hours.    You may remove the dressing in 3 days.    If you have steri strips in place, do not attempt to remove, they are glued on.  The nurse will remove them at your follow up visit.    To reduce risk for infection, avoid having any elective medical or dental procedure within 6 weeks of your device implant (this excludes routine dental cleaning). If you are needing to have a procedure that is urgent or emergent within this 6 weeks please contact your device clinic for further instructions. Once your device has been in for 6 weeks you do NOT need to be pretreated with antibiotics for any medical procedure or dental procedure.      Procedural Physicians: Dr. Lundberg    To reach the Device Registered Nurses regarding questions about your device incision or device function please call [phone] Option #3    [facility]:  [phone]  If you are calling after hours, please listen to the entire voice mail, a live  will answer at the end of the message.

## 2025-01-23 NOTE — ANESTHESIA PREPROCEDURE EVALUATION
Anesthesia Pre-Procedure Evaluation    Patient: Inocente Rios   MRN: [id number] : 1955        Procedure : Procedure(s):  Implantable Cardioverter Defibrillator Generator Replacement Single          Past Medical History:   Diagnosis Date    Anoxic encephalopathy (H) 7/16/15    Asthma     Cardiac arrest (H) 7/16/15    Cardiomyopathy (H)     Cardiomyopathy, ischemic     Congestive heart failure, unspecified     Coronary artery disease     Coronary artery disease     Depressive disorder     High cholesterol     Hyperlipidemia     Keratosis     MI (myocardial infarction) (H) 7/10/15    anterior    BECKI (obstructive sleep apnea)     no CPAP    Prostate cancer (H) 3/30/2022    Uncomplicated asthma       Past Surgical History:   Procedure Laterality Date    CARDIAC CATHETERIZATION      CORONARY STENT PLACEMENT      EP ICD INSERT      HERNIA REPAIR      INSERT / REPLACE / REMOVE PACEMAKER      LAPAROSCOPIC APPENDECTOMY N/A 2023    Procedure: APPENDECTOMY, LAPAROSCOPIC;  Surgeon: Tung Rodas MD;  Location: [facility]    LAPAROSCOPIC HERNIORRHAPHY INGUINAL Bilateral 2016    Procedure: LAPAROSCOPIC BILATERAL INGUINAL HERNIA REPAIR;  Surgeon: Yung Anderson MD;  Location: [facility];  Service:     OTHER SURGICAL HISTORY  7/10/15    coronary stentsmid lad    OTHER SURGICAL HISTORY  7/22/15    icd dual, [facility]    PICC  2015           No Known Allergies   Social History     Tobacco Use    Smoking status: Never    Smokeless tobacco: Never   Substance Use Topics    Alcohol use: Yes     Alcohol/week: 7.0 standard drinks of alcohol      Wt Readings from Last 1 Encounters:   01/15/25 72 kg (158 lb 12.8 oz)        Anesthesia Evaluation        History of anesthetic complications       ROS/MED HX  ENT/Pulmonary:     (+) sleep apnea, mild, doesn't use CPAP,                    asthma                  Neurologic:       Cardiovascular:     (+) Dyslipidemia - -  CAD -  - -      CHF         "pacemaker,                        METS/Exercise Tolerance:     Hematologic:       Musculoskeletal:       GI/Hepatic:       Renal/Genitourinary:       Endo:       Psychiatric/Substance Use:       Infectious Disease:       Malignancy:       Other:            Physical Exam    Airway        Mallampati: II   TM distance: > 3 FB   Neck ROM: full   Mouth opening: > 3 cm    Respiratory Devices and Support         Dental       (+) Minor Abnormalities - some fillings, tiny chips      Cardiovascular          Rhythm and rate: regular and normal     Pulmonary           breath sounds clear to auscultation           OUTSIDE LABS:  CBC:   Lab Results   Component Value Date    WBC 10.8 01/28/2023    HGB 14.0 01/28/2023    HGB 13.7 09/06/2019    HCT 42.7 01/28/2023    HCT 44.7 09/06/2019     01/28/2023     BMP:   Lab Results   Component Value Date     04/25/2024     04/17/2024    POTASSIUM 4.1 04/25/2024    POTASSIUM 4.0 04/17/2024    CHLORIDE 104 04/25/2024    CHLORIDE 102 04/17/2024    CO2 24 04/25/2024    CO2 25 04/17/2024    BUN 11.8 04/25/2024    BUN 12.8 04/17/2024    CR 1.03 04/25/2024    CR 1.13 04/17/2024    GLC 82 04/25/2024     (H) 04/17/2024     COAGS: No results found for: \"PTT\", \"INR\", \"FIBR\"  POC: No results found for: \"BGM\", \"HCG\", \"HCGS\"  HEPATIC:   Lab Results   Component Value Date    ALBUMIN 4.0 04/25/2024    PROTTOTAL 6.1 (L) 04/25/2024    ALT 23 04/25/2024    AST 30 04/25/2024    ALKPHOS 65 04/25/2024    BILITOTAL 0.2 04/25/2024     OTHER:   Lab Results   Component Value Date    ALEENA 8.6 (L) 04/25/2024    LIPASE 43 04/25/2024    TSH 6.65 (H) 04/17/2024    T4 1.17 04/17/2024       Anesthesia Plan    ASA Status:  3    NPO Status:  NPO Appropriate    Anesthesia Type: MAC.      Maintenance: TIVA.        Consents    Anesthesia Plan(s) and associated risks, benefits, and realistic alternatives discussed. Questions answered and patient/representative(s) expressed understanding.     - Discussed: " Risks, Benefits and Alternatives for BOTH SEDATION and the PROCEDURE were discussed     - Discussed with:  Patient            Postoperative Care    Pain management: Multi-modal analgesia.   PONV prophylaxis: Ondansetron (or other 5HT-3)     Comments:               Franny Arndt MD    I have reviewed the pertinent notes and labs in the chart from the past 30 days and (re)examined the patient.  Any updates or changes from those notes are reflected in this note.    Clinically Significant Risk Factors Present on Admission                                  # ICD device present

## 2025-01-23 NOTE — H&P
H/P was performed at [facility] and is available in [facility] for review, as well as via the following hyperlink:    Scan, Provider  Physician     H&P  Signed     Creation Time: 1/22/2025  2:32 PM   Related encounter: Transferred Records from 1/22/2025 in [facility]     Associated Documents  Scan on 1/22/2025 2:32 PM by Flakita Sousa: SHAGGY PRE OP H/P 01/20/25           Routing History

## 2025-01-23 NOTE — PROGRESS NOTES
Pt ready for CV lab. Vanco infusing. Pt has ride home. Per Dr. Lundberg, ok for pt to have caregiver 2 hours at home, then ok for self care.

## 2025-01-24 NOTE — PROGRESS NOTES
Patient is kept comfortable during post-procedure stay. VSS. C/o chest incisional pain treated with Tylenol and ice pack. Left chest incision site remains dry & free from signs of bleeding. Tolerated food and fluids. Ambulated without issues. Appointments already made & included in AVS. Dr. Lundberg was able to speak with patient post procedure. Post-op instructions reviewed and packet given to patient. Able to ask questions. Verbalized no concerns. Belongings returned. Discharged in stable condition.

## 2025-01-28 DIAGNOSIS — C61 PROSTATE CANCER (H): ICD-10-CM

## 2025-01-28 DIAGNOSIS — C79.51 METASTASIS TO BONE (H): Primary | ICD-10-CM

## 2025-01-29 ENCOUNTER — TELEPHONE (OUTPATIENT)
Dept: CARDIOLOGY | Facility: CLINIC | Age: 70
End: 2025-01-29
Payer: MEDICARE

## 2025-01-29 ENCOUNTER — TRANSFERRED RECORDS (OUTPATIENT)
Dept: HEALTH INFORMATION MANAGEMENT | Facility: CLINIC | Age: 70
End: 2025-01-29
Payer: MEDICARE

## 2025-01-29 NOTE — TELEPHONE ENCOUNTER
Call received from [facility] requesting a device check prior to starting radiation treatment and post-radiation treatment.     Per chart review patient has upcoming postop visit tomorrow on January 30.  Will use that appointment as preradiation check, his last radiation treatment is on 2/7/2025-we will schedule an automatic remote transmission postradiation for 2/10/25.     1/29/2025 11:12 AM  Kylee Sloan RN  [facility] ([facility])

## 2025-01-30 ENCOUNTER — TELEPHONE (OUTPATIENT)
Dept: CARDIOLOGY | Facility: CLINIC | Age: 70
End: 2025-01-30

## 2025-01-30 ENCOUNTER — ANCILLARY PROCEDURE (OUTPATIENT)
Dept: CARDIOLOGY | Facility: CLINIC | Age: 70
End: 2025-01-30
Attending: INTERNAL MEDICINE
Payer: MEDICARE

## 2025-01-30 DIAGNOSIS — I49.01 VF (VENTRICULAR FIBRILLATION) (H): ICD-10-CM

## 2025-01-30 DIAGNOSIS — Z95.810 ICD (IMPLANTABLE CARDIOVERTER-DEFIBRILLATOR) IN PLACE: ICD-10-CM

## 2025-01-30 LAB
MDC_IDC_LEAD_CONNECTION_STATUS: NORMAL
MDC_IDC_LEAD_IMPLANT_DT: NORMAL
MDC_IDC_LEAD_LOCATION: NORMAL
MDC_IDC_LEAD_LOCATION_DETAIL_1: NORMAL
MDC_IDC_LEAD_MFG: NORMAL
MDC_IDC_LEAD_MODEL: NORMAL
MDC_IDC_LEAD_POLARITY_TYPE: NORMAL
MDC_IDC_LEAD_SERIAL: NORMAL
MDC_IDC_MSMT_BATTERY_DTM: NORMAL
MDC_IDC_MSMT_BATTERY_REMAINING_LONGEVITY: 174 MO
MDC_IDC_MSMT_BATTERY_REMAINING_PERCENTAGE: 100 %
MDC_IDC_MSMT_BATTERY_STATUS: NORMAL
MDC_IDC_MSMT_CAP_CHARGE_DTM: NORMAL
MDC_IDC_MSMT_CAP_CHARGE_TIME: 9.8 S
MDC_IDC_MSMT_CAP_CHARGE_TYPE: NORMAL
MDC_IDC_MSMT_LEADCHNL_RV_IMPEDANCE_VALUE: 751 OHM
MDC_IDC_MSMT_LEADCHNL_RV_PACING_THRESHOLD_AMPLITUDE: 1.3 V
MDC_IDC_MSMT_LEADCHNL_RV_PACING_THRESHOLD_PULSEWIDTH: 1 MS
MDC_IDC_PG_IMPLANT_DTM: NORMAL
MDC_IDC_PG_MFG: NORMAL
MDC_IDC_PG_MODEL: NORMAL
MDC_IDC_PG_SERIAL: NORMAL
MDC_IDC_PG_TYPE: NORMAL
MDC_IDC_SESS_CLINIC_NAME: NORMAL
MDC_IDC_SESS_DTM: NORMAL
MDC_IDC_SESS_TYPE: NORMAL
MDC_IDC_SET_BRADY_LOWRATE: 40 {BEATS}/MIN
MDC_IDC_SET_BRADY_MODE: NORMAL
MDC_IDC_SET_LEADCHNL_RV_PACING_AMPLITUDE: 2.8 V
MDC_IDC_SET_LEADCHNL_RV_PACING_CAPTURE_MODE: NORMAL
MDC_IDC_SET_LEADCHNL_RV_PACING_POLARITY: NORMAL
MDC_IDC_SET_LEADCHNL_RV_PACING_PULSEWIDTH: 1 MS
MDC_IDC_SET_LEADCHNL_RV_SENSING_ADAPTATION_MODE: NORMAL
MDC_IDC_SET_LEADCHNL_RV_SENSING_POLARITY: NORMAL
MDC_IDC_SET_LEADCHNL_RV_SENSING_SENSITIVITY: 0.6 MV
MDC_IDC_SET_ZONE_DETECTION_INTERVAL: 250 MS
MDC_IDC_SET_ZONE_DETECTION_INTERVAL: 324 MS
MDC_IDC_SET_ZONE_STATUS: NORMAL
MDC_IDC_SET_ZONE_STATUS: NORMAL
MDC_IDC_SET_ZONE_TYPE: NORMAL
MDC_IDC_SET_ZONE_TYPE: NORMAL
MDC_IDC_SET_ZONE_VENDOR_TYPE: NORMAL
MDC_IDC_SET_ZONE_VENDOR_TYPE: NORMAL
MDC_IDC_STAT_BRADY_DTM_END: NORMAL
MDC_IDC_STAT_BRADY_DTM_START: NORMAL
MDC_IDC_STAT_BRADY_RV_PERCENT_PACED: 0 %
MDC_IDC_STAT_EPISODE_RECENT_COUNT: 0
MDC_IDC_STAT_EPISODE_RECENT_COUNT_DTM_END: NORMAL
MDC_IDC_STAT_EPISODE_RECENT_COUNT_DTM_START: NORMAL
MDC_IDC_STAT_EPISODE_TYPE: NORMAL
MDC_IDC_STAT_EPISODE_VENDOR_TYPE: NORMAL
MDC_IDC_STAT_TACHYTHERAPY_ATP_DELIVERED_RECENT: 0
MDC_IDC_STAT_TACHYTHERAPY_ATP_DELIVERED_TOTAL: 0
MDC_IDC_STAT_TACHYTHERAPY_RECENT_DTM_END: NORMAL
MDC_IDC_STAT_TACHYTHERAPY_RECENT_DTM_START: NORMAL
MDC_IDC_STAT_TACHYTHERAPY_SHOCKS_ABORTED_RECENT: 0
MDC_IDC_STAT_TACHYTHERAPY_SHOCKS_ABORTED_TOTAL: 0
MDC_IDC_STAT_TACHYTHERAPY_SHOCKS_DELIVERED_RECENT: 0
MDC_IDC_STAT_TACHYTHERAPY_SHOCKS_DELIVERED_TOTAL: 0
MDC_IDC_STAT_TACHYTHERAPY_TOTAL_DTM_END: NORMAL
MDC_IDC_STAT_TACHYTHERAPY_TOTAL_DTM_START: NORMAL

## 2025-01-30 NOTE — TELEPHONE ENCOUNTER
Call received from Jamaica at [facility] requesting a device check prior to starting radiation treatment and post-radiation treatment.  Jamaica stated that the patient moved his radiation treatment dates.       Per chart review- patient does have a post op visit today, 1/30/2025.  Scheduled automatic remote transmission on 2/10/2025 for pre-radiation check.  First radiation treatment on 2/17/2025 & last radiation treatment is now on 2/21/2025.  Scheduled automatic remote transmission on 2/24/2025 for post radiation check.       Jessy Martin, Device RN  [facility] ([facility])  1/30/2025   (11:50 AM)

## 2025-01-31 PROCEDURE — 93282 PRGRMG EVAL IMPLANTABLE DFB: CPT | Performed by: INTERNAL MEDICINE

## 2025-02-09 ENCOUNTER — DOCUMENTATION ONLY (OUTPATIENT)
Dept: ONCOLOGY | Facility: CLINIC | Age: 70
End: 2025-02-09
Payer: MEDICARE

## 2025-02-09 NOTE — PROGRESS NOTES
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  There was a CDKN1B exon 1, p.D63V, c.188A>T variant of uncertain significance.  Microsatellite instability status was MS-stable.  Tumor mutational burden was 5 Muts/Mb. gLOH was low at 6%.

## 2025-02-21 ENCOUNTER — TRANSFERRED RECORDS (OUTPATIENT)
Dept: HEALTH INFORMATION MANAGEMENT | Facility: CLINIC | Age: 70
End: 2025-02-21
Payer: MEDICARE

## 2025-02-21 NOTE — PROGRESS NOTES
PRIMARY CARE PHYSICIAN: Slade Parisi MD  CARDIOLOGY: Joseph Cronin MD  MEDICAL ONCOLOGY: Jorge Salmon MD  RADIATION ONCOLOGY: Derrick Brennan MD  UROLOGY: Bing Angel MD    HISTORY OF PRESENT ILLNESS: Patient is a 69-year-old male with stage IVB adenocarcinoma prostate (pT3a, pN0, cM1b, PSA 8.6 ng/mL, grade group 3).  Patient was diagnosed with stage III adenocarcinoma prostate (pT3a, pN0, cM0, PSA 8.6 ng/mL, grade group 3), and presented with an elevated PSA of 8.6 ng/mL (01/03/2020). Transrectal ultrasound-guided prostate core needle biopsy 02/27/2020, revealed a Hernandez 4+3=7 adenocarcinoma involving 75% of the core needle biopsy sample from the right mid medial, 75% of the biopsy sample from the right mid lateral, 90% of the biopsy sample from the right medial base, 70% of the biopsy sample with perineural invasion from the right lateral base.  There is a Hernandez 3+4=7 adenocarcinoma involving 30% of the core needle biopsy sample from the left medial apex, 50% of the biopsy sample from the left lateral apex, 25% of the biopsy sample from the left mid medial, 55% of the biopsy sample from the left medial base.  There was a [place] 3+3=6 adenocarcinoma involving 20% of the core needle biopsy sample from the right medial apex, 5-10% of the biopsy sample from the right lateral apex, 30% of the biopsy sample from the left mid lateral.  There was high-grade prostatic intraepithelial neoplasia with rare adjacent small atypical glands in the core needle biopsy sample from the left lateral base (11/12 biopsy samples involved). CT abdomen, pelvis 05/04/2020, was negative for adenopathy or metastases.  There was heterogeneous enhancement of the prostate with 9 mm enhancing nodule along the right posterior aspect of the prostate.  Bone scan 05/04/2020 was negative for metastases.  There was mild uptake in the bilateral acromioclavicular joints and right knee. Patient underwent robotic-assisted laparoscopic  radical prostatectomy and bilateral pelvic lymph node dissection 05/20/2020, there revealed a [place] 4+3=7 acinar and ductal adenocarcinoma involving 35% of the prostate with the largest focus measuring 1.5 cm.  There was nonfocal extraprostatic extension involving the right posterior prostate.  There was perineural, but no lymphovascular invasion.  There was no bladder neck invasion or seminal vesicle involvement.  Surgical margins were tumor free. Two pelvic lymph nodes were negative for metastases (0/2 lymph nodes involved). NGS testing ([facility]) on the prostatectomy sample (05/20/2020), revealed a CDKN1B exon 1, p.Q65*, c.193C>T, and CREBBP exon 3, p.T291fs, c.870delC pathogenic variants.  There was a CDKN1B exon 1, p.D63V, c.188A>T variant of uncertain significance.  Microsatellite instability status was MS-stable.  Tumor mutational burden was 5 Muts/Mb. gLOH was low at 6%.     PSA was undetectable at <0.1 ng/mL (from 08/12/2020 to 05/27/2021) with subsequent biochemical pression with PSA 0.13 ng/mL (11/11/2021), PSA 0.15 ng/mL (12/03/2021), PSA 0.35 ng/mL (02/22/2022).  Decipher Genomic Risk 12/22/2021) revealed a decipher score of 0.32 (low risk).  18-F-Fluciclovine PET/CT scan 01/10/2022, revealed postsurgical changes related to prior prostatectomy and pelvic lymph node dissection.  There were no suspicious PSMA-avid lesions.  Patient received salvage radiation therapy to the prostate fossa (6800 cGy in 34 fractions) and bilateral pelvic lymph nodes (4500 cGy in 25 fractions) from 04/18/2022 through 06/06/2022, without androgen deprivation therapy (ADT).  There was a PSA alisa of 0.1 ng/mL (12/30/2022) followed by a rising PSA of 0.2 ng/mL (03/30/2023), PSA 0.3 ng/mL (10/24/2023).  PSMA PET/CT scan 10/30/2023, was negative for PSMA-avid lesions.    There was continued biochemical progression with PSA 0.6 ng/mL (01/21/2024), PSA 0.76 ng mL (03/21/2024), PSA 0.97 ng/mL (05/02/2024).  PSMA PET/CT scan  05/24/2024, showed postsurgical changes related to prior prostatectomy.  There were no PSMA-avid metastases.  Patient continued observation with PSA 1.1 ng mL (07/31/2024), PSA 1.92 ng/mL (10/31/2024), PSA 2.2 ng mL (12/03/2024).  PSMA PET/CT scan 12/20/2024, revealed PSMA-avid metastases in the right T6 transverse process and left L2 pedicle. Patient received stereotactic ablative radiation therapy (SABR) to the T6 and L2 metastasis completed 02/24/2025. Patient initially opted to proceed with first-line ADT consisting of leuprolide 22.5 mg every 3 months and enzalutamide 160 mg daily, but wishes to withhold treatment for the time being in order to avoid treatment-associated adverse effects that would have a negative impact on quality of life.  Patient has fatigue and mild decrease in exercise tolerance.  Patient has impotence since prostate cancer surgery and radiation therapy. There are no other new symptoms or events since the prior clinic visit (01/15/2025).  Patient denies fever, anorexia, weight loss, headache, cough, dyspnea, chest pain, abdominal pain, nausea, vomiting, constipation, diarrhea.     PAST HISTORY: Past history was reviewed and unchanged from the clinic note 01/15/2025.     MEDICATIONS:   Current Outpatient Medications   Medication Sig Dispense Refill    albuterol (PROAIR HFA/PROVENTIL HFA/VENTOLIN HFA) 108 (90 Base) MCG/ACT inhaler Inhale 2 puffs into the lungs every 4 hours as needed for shortness of breath / dyspnea or wheezing 18 g 1    ASPIRIN PO Take 81 mg by mouth      atorvastatin (LIPITOR) 40 MG tablet TAKE 1 TABLET (40 MG TOTAL) BY MOUTH DAILY. 90 tablet 0    bicalutamide (CASODEX) 50 MG tablet Take 1 tablet (50 mg) by mouth daily for 21 days. Begin at the time of the first leuprolide injection. 21 tablet 0    calcium carbonate-vitamin D (OSCAL) 500-5 MG-MCG tablet Take 1 tablet by mouth 2 times daily. 120 tablet 5    metoprolol succinate ER (TOPROL XL) 25 MG 24 hr tablet Take 1  tablet (25 mg) by mouth daily. 90 tablet 3    order for DME Equipment being ordered: Home blood pressure monitor 1 Units 0    sildenafil (VIAGRA) 100 MG tablet Take one half to one tablet as needed. 20 tablet 11    tadalafil (CIALIS) 20 MG tablet Take 10 mg by mouth twice a week         REVIEW OF SYSTEMS: Review of systems reviewed with the patient and otherwise negative except for those detailed above.    PHYSICAL EXAM: /82 (BP Location: Right arm, Patient Position: Sitting, Cuff Size: Adult Regular)   Pulse 67   Temp 97.7  F (36.5  C) (Tympanic)   Resp 16   Wt 72.7 kg (160 lb 3.2 oz)   SpO2 98%   BMI 24.36 kg/m  . ECOG performance status: 0. Physical exam was unchanged from prior clinic visit 01/15/2025.  Skin: No erythema or rash.  HEENT: Sclera nonicteric. Oropharynx without lesions or ulceration, mucosa pink and moist.  Nodes: No cervical, supraclavicular, axillary, or inguinal adenopathy.  Lungs: No dullness to percussion.  No rales, wheezes, rhonchi.  Heart: Regular rate and rhythm.  Abdomen: Bowel sounds present.  Soft, nontender, no hepatosplenomegaly or mass.  Extremities: No edema.     LABORATORY REVIEWED:  Component      Latest Ref Rng 2/27/2025  9:27 AM   WBC      4.0 - 11.0 10e3/uL 3.1 (L)    RBC Count      4.40 - 5.90 10e6/uL 4.35 (L)    Hemoglobin      13.3 - 17.7 g/dL 13.0 (L)    Hematocrit      40.0 - 53.0 % 39.6 (L)    MCV      78 - 100 fL 91    MCH      26.5 - 33.0 pg 29.9    MCHC      31.5 - 36.5 g/dL 32.8    RDW      10.0 - 15.0 % 13.0    Platelet Count      150 - 450 10e3/uL 176    % Neutrophils      % 56    % Lymphocytes      % 21    % Monocytes      % 11    % Eosinophils      % 11    % Basophils      % 1    % Immature Granulocytes      % 1    NRBCs per 100 WBC      <1 /100 0    Absolute Neutrophils      1.6 - 8.3 10e3/uL 1.7    Absolute Lymphocytes      0.8 - 5.3 10e3/uL 0.6 (L)    Absolute Monocytes      0.0 - 1.3 10e3/uL 0.3    Absolute Eosinophils      0.0 - 0.7 10e3/uL 0.3     Absolute Basophils      0.0 - 0.2 10e3/uL 0.0    Absolute Immature Granulocytes      <=0.4 10e3/uL 0.0    Absolute NRBCs      10e3/uL 0.0    Sodium      135 - 145 mmol/L 140    Potassium      3.4 - 5.3 mmol/L 3.8    Carbon Dioxide (CO2)      22 - 29 mmol/L 26    Anion Gap      7 - 15 mmol/L 8    Urea Nitrogen      8.0 - 23.0 mg/dL 12.2    Creatinine      0.67 - 1.17 mg/dL 0.98    GFR Estimate      >60 mL/min/1.73m2 83    Calcium      8.8 - 10.4 mg/dL 8.8    Chloride      98 - 107 mmol/L 106    Glucose      70 - 99 mg/dL 78    Alkaline Phosphatase      40 - 150 U/L 67    AST      0 - 45 U/L 25    ALT      0 - 70 U/L 20    Protein Total      6.4 - 8.3 g/dL 6.2 (L)    Albumin      3.5 - 5.2 g/dL 4.0    Bilirubin Total      <=1.2 mg/dL 0.4    PSA Tumor Marker      0.00 - 4.50 ng/mL 3.18        IMPRESSION/PLAN: Stage IVB adenocarcinoma prostate.  Prostate cancer is a [place] 4+3=7 adenocarcinoma with perineural invasion and extraprostatic extension, without locoregional or distant metastases.  Patient underwent definitive surgery (05/20/2020).  Postoperative PSA was undetectable (<0.1 ng/mL from 08/12/2020 to 05/27/2021) with subsequent biochemical pression with PSA 0.13 ng/mL (11/11/2021), PSA 0.35 ng/mL (02/22/2022).  There was no evidence of metastases on restaging 18-F-Fluciclovine PET/CT scan (01/10/2022).  Patient received salvage radiation therapy to the prostate fossa and bilateral pelvic lymph nodes (from 04/18/2022 through 06/06/2022) without ADT.  There was a PSA alisa of 0.1 ng/mL (12/30/2022) followed by biochemical progression with PSA of 0.2 ng/mL (03/30/2023), PSA 0.3 ng/mL (10/24/2023).  PSA 0.76 ng mL (03/21/2024), PSA 0.97 ng/mL (05/02/2024), PSA 1.1 ng mL (07/31/2024), PSA 1.92 ng/mL (10/31/2024), PSA 2.2 ng mL (12/03/2024), PSA 3.18 ng/mL (02/27/2025).  There was no evidence of metastases on PSMA PET/CT scan (10/30/2023, 05/24/2024) with subsequent detection of skeletal metastases to the right T6  transverse process and left L2 pedicle detected on repeat PSMA PET/CT scan (12/20/2024).  There is a PSA doubling time of 3-4 months.  Frontline therapy for low-volume metastatic hormone sensitive prostate cancer would consist of ADT consisting of leuprolide every 3 months plus an androgen receptor pathway inhibitor such as abiraterone (LATITUDE clinical trial, N Engl J Med 2017;377:352-360), enzalutamide (ENZAMET clinical trial, N Engl J Med 2019;381:121-131), or apalutamide (TITAN clinical trial, N Engl J Med 2019;381(1):13-24).  Patient received SABR to the T6 and L2 metastasis (completed 02/24/2025). Patient was prescribed first-line ADT consisting of leuprolide 22.5 mg every 3 months and enzalutamide 160 mg daily, but opted to postpone the initiation of therapy due to the potential adverse effects that would impact his quality of life.  I reviewed the risks and side effects of enzalutamide with the patient, which include fatigue, weakness, flushing, anorexia, weight loss, headache, dizziness, change in taste sensation, breast tenderness and swelling, constipation, diarrhea, peripheral edema, myalgias, arthralgias, falls, forgetfulness, depression, hypertension, and seizures.  Leuprolide is associated with fatigue, hot flashes, weakness, loss of muscle mass, weight gain, forgetfulness, depression, irritability breast enlargement, loss of libido, impotence, joint stiffness and pain, coronary artery disease, osteoporosis, and bone fracture.  Patient wishes to remain off therapy for the time being. Bone density scan will be performed to evaluate for osteopenia/osteoporosis.  Patient will return to clinic in 3 months with PSA and to review his treatment options. The current and past history, clinical evaluation, reviewing diagnostic tests and imaging with the patient, and assessment, complex management of androgen deprivation therapy and androgen receptor pathway inhibitor toxicities, and planning occurred over 30  minutes.       Александр Chavez MD    cc: MD Joseph Ellington MD Robert Delaune, MD Ryan K Funk, MD Basir U Tareen, MD

## 2025-02-26 ENCOUNTER — PATIENT OUTREACH (OUTPATIENT)
Dept: ONCOLOGY | Facility: CLINIC | Age: 70
End: 2025-02-26
Payer: MEDICARE

## 2025-02-26 NOTE — PROGRESS NOTES
[facility]: Cancer Care                                                                                          Patient called with questions regarding upcoming lab appt.  Answered all questions to patient's satisfaction.    Simona LOUIS RN  Cancer Care Coordinator  [facility]

## 2025-02-27 ENCOUNTER — APPOINTMENT (OUTPATIENT)
Dept: LAB | Facility: CLINIC | Age: 70
End: 2025-02-27
Attending: INTERNAL MEDICINE
Payer: MEDICARE

## 2025-02-27 ENCOUNTER — ONCOLOGY VISIT (OUTPATIENT)
Dept: ONCOLOGY | Facility: CLINIC | Age: 70
End: 2025-02-27
Attending: INTERNAL MEDICINE
Payer: MEDICARE

## 2025-02-27 VITALS
WEIGHT: 160.2 LBS | HEIGHT: 68 IN | RESPIRATION RATE: 16 BRPM | DIASTOLIC BLOOD PRESSURE: 82 MMHG | HEART RATE: 67 BPM | OXYGEN SATURATION: 98 % | SYSTOLIC BLOOD PRESSURE: 135 MMHG | BODY MASS INDEX: 24.28 KG/M2 | TEMPERATURE: 97.7 F

## 2025-02-27 DIAGNOSIS — C79.51 METASTASIS TO BONE (H): ICD-10-CM

## 2025-02-27 DIAGNOSIS — Z79.818 ANDROGEN DEPRIVATION THERAPY: ICD-10-CM

## 2025-02-27 DIAGNOSIS — C61 PROSTATE CANCER (H): Primary | ICD-10-CM

## 2025-02-27 LAB
ALBUMIN SERPL BCG-MCNC: 4 G/DL (ref 3.5–5.2)
ALP SERPL-CCNC: 67 U/L (ref 40–150)
ALT SERPL W P-5'-P-CCNC: 20 U/L (ref 0–70)
ANION GAP SERPL CALCULATED.3IONS-SCNC: 8 MMOL/L (ref 7–15)
AST SERPL W P-5'-P-CCNC: 25 U/L (ref 0–45)
BASOPHILS # BLD AUTO: 0 10E3/UL (ref 0–0.2)
BASOPHILS NFR BLD AUTO: 1 %
BILIRUB SERPL-MCNC: 0.4 MG/DL
BUN SERPL-MCNC: 12.2 MG/DL (ref 8–23)
CALCIUM SERPL-MCNC: 8.8 MG/DL (ref 8.8–10.4)
CHLORIDE SERPL-SCNC: 106 MMOL/L (ref 98–107)
CREAT SERPL-MCNC: 0.98 MG/DL (ref 0.67–1.17)
EGFRCR SERPLBLD CKD-EPI 2021: 83 ML/MIN/1.73M2
EOSINOPHIL # BLD AUTO: 0.3 10E3/UL (ref 0–0.7)
EOSINOPHIL NFR BLD AUTO: 11 %
ERYTHROCYTE [DISTWIDTH] IN BLOOD BY AUTOMATED COUNT: 13 % (ref 10–15)
GLUCOSE SERPL-MCNC: 78 MG/DL (ref 70–99)
HCO3 SERPL-SCNC: 26 MMOL/L (ref 22–29)
HCT VFR BLD AUTO: 39.6 % (ref 40–53)
HGB BLD-MCNC: 13 G/DL (ref 13.3–17.7)
IMM GRANULOCYTES # BLD: 0 10E3/UL
IMM GRANULOCYTES NFR BLD: 1 %
LYMPHOCYTES # BLD AUTO: 0.6 10E3/UL (ref 0.8–5.3)
LYMPHOCYTES NFR BLD AUTO: 21 %
MCH RBC QN AUTO: 29.9 PG (ref 26.5–33)
MCHC RBC AUTO-ENTMCNC: 32.8 G/DL (ref 31.5–36.5)
MCV RBC AUTO: 91 FL (ref 78–100)
MONOCYTES # BLD AUTO: 0.3 10E3/UL (ref 0–1.3)
MONOCYTES NFR BLD AUTO: 11 %
NEUTROPHILS # BLD AUTO: 1.7 10E3/UL (ref 1.6–8.3)
NEUTROPHILS NFR BLD AUTO: 56 %
NRBC # BLD AUTO: 0 10E3/UL
NRBC BLD AUTO-RTO: 0 /100
PLATELET # BLD AUTO: 176 10E3/UL (ref 150–450)
POTASSIUM SERPL-SCNC: 3.8 MMOL/L (ref 3.4–5.3)
PROT SERPL-MCNC: 6.2 G/DL (ref 6.4–8.3)
PSA SERPL DL<=0.01 NG/ML-MCNC: 3.18 NG/ML (ref 0–4.5)
RBC # BLD AUTO: 4.35 10E6/UL (ref 4.4–5.9)
SODIUM SERPL-SCNC: 140 MMOL/L (ref 135–145)
WBC # BLD AUTO: 3.1 10E3/UL (ref 4–11)

## 2025-02-27 PROCEDURE — 85014 HEMATOCRIT: CPT | Performed by: INTERNAL MEDICINE

## 2025-02-27 PROCEDURE — 80053 COMPREHEN METABOLIC PANEL: CPT | Performed by: INTERNAL MEDICINE

## 2025-02-27 PROCEDURE — 36415 COLL VENOUS BLD VENIPUNCTURE: CPT | Performed by: INTERNAL MEDICINE

## 2025-02-27 PROCEDURE — G0463 HOSPITAL OUTPT CLINIC VISIT: HCPCS | Performed by: INTERNAL MEDICINE

## 2025-02-27 PROCEDURE — 84153 ASSAY OF PSA TOTAL: CPT | Performed by: INTERNAL MEDICINE

## 2025-02-27 PROCEDURE — 85004 AUTOMATED DIFF WBC COUNT: CPT | Performed by: INTERNAL MEDICINE

## 2025-02-27 ASSESSMENT — PAIN SCALES - GENERAL: PAINLEVEL_OUTOF10: NO PAIN (0)

## 2025-02-27 NOTE — LETTER
2/27/2025      Inocente Rios  [address]  [facility] [address]      Dear Colleague,    Thank you for referring your patient, Inocente Rios, to the [facility]. Please see a copy of my visit note below.      PRIMARY CARE PHYSICIAN: Slade Parisi MD  CARDIOLOGY: Joseph Cronin MD  MEDICAL ONCOLOGY: Jorge Salmon MD  RADIATION ONCOLOGY: Derrick Brennan MD  UROLOGY: Bing Angel MD    HISTORY OF PRESENT ILLNESS: Patient is a 69-year-old male with stage IVB adenocarcinoma prostate (pT3a, pN0, cM1b, PSA 8.6 ng/mL, grade group 3).  Patient was diagnosed with stage III adenocarcinoma prostate (pT3a, pN0, cM0, PSA 8.6 ng/mL, grade group 3), and presented with an elevated PSA of 8.6 ng/mL (01/03/2020). Transrectal ultrasound-guided prostate core needle biopsy 02/27/2020, revealed a [place] 4+3=7 adenocarcinoma involving 75% of the core needle biopsy sample from the right mid medial, 75% of the biopsy sample from the right mid lateral, 90% of the biopsy sample from the right medial base, 70% of the biopsy sample with perineural invasion from the right lateral base.  There is a [place] 3+4=7 adenocarcinoma involving 30% of the core needle biopsy sample from the left medial apex, 50% of the biopsy sample from the left lateral apex, 25% of the biopsy sample from the left mid medial, 55% of the biopsy sample from the left medial base.  There was a Hernandez 3+3=6 adenocarcinoma involving 20% of the core needle biopsy sample from the right medial apex, 5-10% of the biopsy sample from the right lateral apex, 30% of the biopsy sample from the left mid lateral.  There was high-grade prostatic intraepithelial neoplasia with rare adjacent small atypical glands in the core needle biopsy sample from the left lateral base (11/12 biopsy samples involved). CT abdomen, pelvis 05/04/2020, was negative for adenopathy or metastases.  There was heterogeneous enhancement of the prostate with 9 mm  enhancing nodule along the right posterior aspect of the prostate.  Bone scan 05/04/2020 was negative for metastases.  There was mild uptake in the bilateral acromioclavicular joints and right knee. Patient underwent robotic-assisted laparoscopic radical prostatectomy and bilateral pelvic lymph node dissection 05/20/2020, there revealed a [place] 4+3=7 acinar and ductal adenocarcinoma involving 35% of the prostate with the largest focus measuring 1.5 cm.  There was nonfocal extraprostatic extension involving the right posterior prostate.  There was perineural, but no lymphovascular invasion.  There was no bladder neck invasion or seminal vesicle involvement.  Surgical margins were tumor free. Two pelvic lymph nodes were negative for metastases (0/2 lymph nodes involved). NGS testing ([facility]) on the prostatectomy sample (05/20/2020), revealed a CDKN1B exon 1, p.Q65*, c.193C>T, and CREBBP exon 3, p.T291fs, c.870delC pathogenic variants.  There was a CDKN1B exon 1, p.D63V, c.188A>T variant of uncertain significance.  Microsatellite instability status was MS-stable.  Tumor mutational burden was 5 Muts/Mb. gLOH was low at 6%.     PSA was undetectable at <0.1 ng/mL (from 08/12/2020 to 05/27/2021) with subsequent biochemical pression with PSA 0.13 ng/mL (11/11/2021), PSA 0.15 ng/mL (12/03/2021), PSA 0.35 ng/mL (02/22/2022).  Decipher Genomic Risk 12/22/2021) revealed a decipher score of 0.32 (low risk).  18-F-Fluciclovine PET/CT scan 01/10/2022, revealed postsurgical changes related to prior prostatectomy and pelvic lymph node dissection.  There were no suspicious PSMA-avid lesions.  Patient received salvage radiation therapy to the prostate fossa (6800 cGy in 34 fractions) and bilateral pelvic lymph nodes (4500 cGy in 25 fractions) from 04/18/2022 through 06/06/2022, without androgen deprivation therapy (ADT).  There was a PSA alisa of 0.1 ng/mL (12/30/2022) followed by a rising PSA of 0.2 ng/mL (03/30/2023), PSA 0.3 ng/mL  (10/24/2023).  PSMA PET/CT scan 10/30/2023, was negative for PSMA-avid lesions.    There was continued biochemical progression with PSA 0.6 ng/mL (01/21/2024), PSA 0.76 ng mL (03/21/2024), PSA 0.97 ng/mL (05/02/2024).  PSMA PET/CT scan 05/24/2024, showed postsurgical changes related to prior prostatectomy.  There were no PSMA-avid metastases.  Patient continued observation with PSA 1.1 ng mL (07/31/2024), PSA 1.92 ng/mL (10/31/2024), PSA 2.2 ng mL (12/03/2024).  PSMA PET/CT scan 12/20/2024, revealed PSMA-avid metastases in the right T6 transverse process and left L2 pedicle. Patient received stereotactic ablative radiation therapy (SABR) to the T6 and L2 metastasis completed 02/24/2025. Patient initially opted to proceed with first-line ADT consisting of leuprolide 22.5 mg every 3 months and enzalutamide 160 mg daily, but wishes to withhold treatment for the time being in order to avoid treatment-associated adverse effects that would have a negative impact on quality of life.  Patient has fatigue and mild decrease in exercise tolerance.  Patient has impotence since prostate cancer surgery and radiation therapy. There are no other new symptoms or events since the prior clinic visit (01/15/2025).  Patient denies fever, anorexia, weight loss, headache, cough, dyspnea, chest pain, abdominal pain, nausea, vomiting, constipation, diarrhea.     PAST HISTORY: Past history was reviewed and unchanged from the clinic note 01/15/2025.     MEDICATIONS:   Current Outpatient Medications   Medication Sig Dispense Refill     albuterol (PROAIR HFA/PROVENTIL HFA/VENTOLIN HFA) 108 (90 Base) MCG/ACT inhaler Inhale 2 puffs into the lungs every 4 hours as needed for shortness of breath / dyspnea or wheezing 18 g 1     ASPIRIN PO Take 81 mg by mouth       atorvastatin (LIPITOR) 40 MG tablet TAKE 1 TABLET (40 MG TOTAL) BY MOUTH DAILY. 90 tablet 0     bicalutamide (CASODEX) 50 MG tablet Take 1 tablet (50 mg) by mouth daily for 21 days. Begin  at the time of the first leuprolide injection. 21 tablet 0     calcium carbonate-vitamin D (OSCAL) 500-5 MG-MCG tablet Take 1 tablet by mouth 2 times daily. 120 tablet 5     metoprolol succinate ER (TOPROL XL) 25 MG 24 hr tablet Take 1 tablet (25 mg) by mouth daily. 90 tablet 3     order for DME Equipment being ordered: Home blood pressure monitor 1 Units 0     sildenafil (VIAGRA) 100 MG tablet Take one half to one tablet as needed. 20 tablet 11     tadalafil (CIALIS) 20 MG tablet Take 10 mg by mouth twice a week         REVIEW OF SYSTEMS: Review of systems reviewed with the patient and otherwise negative except for those detailed above.    PHYSICAL EXAM: /82 (BP Location: Right arm, Patient Position: Sitting, Cuff Size: Adult Regular)   Pulse 67   Temp 97.7  F (36.5  C) (Tympanic)   Resp 16   Wt 72.7 kg (160 lb 3.2 oz)   SpO2 98%   BMI 24.36 kg/m  . ECOG performance status: 0. Physical exam was unchanged from prior clinic visit 01/15/2025.  Skin: No erythema or rash.  HEENT: Sclera nonicteric. Oropharynx without lesions or ulceration, mucosa pink and moist.  Nodes: No cervical, supraclavicular, axillary, or inguinal adenopathy.  Lungs: No dullness to percussion.  No rales, wheezes, rhonchi.  Heart: Regular rate and rhythm.  Abdomen: Bowel sounds present.  Soft, nontender, no hepatosplenomegaly or mass.  Extremities: No edema.     LABORATORY REVIEWED:  Component      Latest Ref Rn 2/27/2025  9:27 AM   WBC      4.0 - 11.0 10e3/uL 3.1 (L)    RBC Count      4.40 - 5.90 10e6/uL 4.35 (L)    Hemoglobin      13.3 - 17.7 g/dL 13.0 (L)    Hematocrit      40.0 - 53.0 % 39.6 (L)    MCV      78 - 100 fL 91    MCH      26.5 - 33.0 pg 29.9    MCHC      31.5 - 36.5 g/dL 32.8    RDW      10.0 - 15.0 % 13.0    Platelet Count      150 - 450 10e3/uL 176    % Neutrophils      % 56    % Lymphocytes      % 21    % Monocytes      % 11    % Eosinophils      % 11    % Basophils      % 1    % Immature Granulocytes      % 1     NRBCs per 100 WBC      <1 /100 0    Absolute Neutrophils      1.6 - 8.3 10e3/uL 1.7    Absolute Lymphocytes      0.8 - 5.3 10e3/uL 0.6 (L)    Absolute Monocytes      0.0 - 1.3 10e3/uL 0.3    Absolute Eosinophils      0.0 - 0.7 10e3/uL 0.3    Absolute Basophils      0.0 - 0.2 10e3/uL 0.0    Absolute Immature Granulocytes      <=0.4 10e3/uL 0.0    Absolute NRBCs      10e3/uL 0.0    Sodium      135 - 145 mmol/L 140    Potassium      3.4 - 5.3 mmol/L 3.8    Carbon Dioxide (CO2)      22 - 29 mmol/L 26    Anion Gap      7 - 15 mmol/L 8    Urea Nitrogen      8.0 - 23.0 mg/dL 12.2    Creatinine      0.67 - 1.17 mg/dL 0.98    GFR Estimate      >60 mL/min/1.73m2 83    Calcium      8.8 - 10.4 mg/dL 8.8    Chloride      98 - 107 mmol/L 106    Glucose      70 - 99 mg/dL 78    Alkaline Phosphatase      40 - 150 U/L 67    AST      0 - 45 U/L 25    ALT      0 - 70 U/L 20    Protein Total      6.4 - 8.3 g/dL 6.2 (L)    Albumin      3.5 - 5.2 g/dL 4.0    Bilirubin Total      <=1.2 mg/dL 0.4    PSA Tumor Marker      0.00 - 4.50 ng/mL 3.18        IMPRESSION/PLAN: Stage IVB adenocarcinoma prostate.  Prostate cancer is a Hernandez 4+3=7 adenocarcinoma with perineural invasion and extraprostatic extension, without locoregional or distant metastases.  Patient underwent definitive surgery (05/20/2020).  Postoperative PSA was undetectable (<0.1 ng/mL from 08/12/2020 to 05/27/2021) with subsequent biochemical pression with PSA 0.13 ng/mL (11/11/2021), PSA 0.35 ng/mL (02/22/2022).  There was no evidence of metastases on restaging 18-F-Fluciclovine PET/CT scan (01/10/2022).  Patient received salvage radiation therapy to the prostate fossa and bilateral pelvic lymph nodes (from 04/18/2022 through 06/06/2022) without ADT.  There was a PSA alisa of 0.1 ng/mL (12/30/2022) followed by biochemical progression with PSA of 0.2 ng/mL (03/30/2023), PSA 0.3 ng/mL (10/24/2023).  PSA 0.76 ng mL (03/21/2024), PSA 0.97 ng/mL (05/02/2024), PSA 1.1 ng mL  (07/31/2024), PSA 1.92 ng/mL (10/31/2024), PSA 2.2 ng mL (12/03/2024), PSA 3.18 ng/mL (02/27/2025).  There was no evidence of metastases on PSMA PET/CT scan (10/30/2023, 05/24/2024) with subsequent detection of skeletal metastases to the right T6 transverse process and left L2 pedicle detected on repeat PSMA PET/CT scan (12/20/2024).  There is a PSA doubling time of 3-4 months.  Frontline therapy for low-volume metastatic hormone sensitive prostate cancer would consist of ADT consisting of leuprolide every 3 months plus an androgen receptor pathway inhibitor such as abiraterone (LATITUDE clinical trial, N Engl J Med 2017;377:352-360), enzalutamide (ENZAMET clinical trial, N Engl J Med 2019;381:121-131), or apalutamide (TITAN clinical trial, N Engl J Med 2019;381(1):13-24).  Patient received SABR to the T6 and L2 metastasis (completed 02/24/2025). Patient was prescribed first-line ADT consisting of leuprolide 22.5 mg every 3 months and enzalutamide 160 mg daily, but opted to postpone the initiation of therapy due to the potential adverse effects that would impact his quality of life.  I reviewed the risks and side effects of enzalutamide with the patient, which include fatigue, weakness, flushing, anorexia, weight loss, headache, dizziness, change in taste sensation, breast tenderness and swelling, constipation, diarrhea, peripheral edema, myalgias, arthralgias, falls, forgetfulness, depression, hypertension, and seizures.  Leuprolide is associated with fatigue, hot flashes, weakness, loss of muscle mass, weight gain, forgetfulness, depression, irritability breast enlargement, loss of libido, impotence, joint stiffness and pain, coronary artery disease, osteoporosis, and bone fracture.  Patient wishes to remain off therapy for the time being. Bone density scan will be performed to evaluate for osteopenia/osteoporosis.  Patient will return to clinic in 3 months with PSA and to review his treatment options. The current  and past history, clinical evaluation, reviewing diagnostic tests and imaging with the patient, and assessment, complex management of androgen deprivation therapy and androgen receptor pathway inhibitor toxicities, and planning occurred over 30 minutes.       Александр Chavez MD    cc: MD Joseph Ellington MD Robert Delaune, MD Ryan K Funk, MD Basir U Tareen, MD      Again, thank you for allowing me to participate in the care of your patient.        Sincerely,        Александр Chavez MD    Electronically signed

## 2025-02-27 NOTE — NURSING NOTE
"Oncology Rooming Note    February 27, 2025 9:36 AM   Inocente Rios is a 69 year old male who presents for:    Chief Complaint   Patient presents with    Blood Draw     Labs drawn via  by RN. VS taken.    Oncology Clinic Visit     Prostate Cancer     Initial Vitals: /82 (BP Location: Right arm, Patient Position: Sitting, Cuff Size: Adult Regular)   Pulse 67   Temp 97.7  F (36.5  C) (Tympanic)   Resp 16   Wt 72.7 kg (160 lb 3.2 oz)   SpO2 98%   BMI 24.36 kg/m   Estimated body mass index is 24.36 kg/m  as calculated from the following:    Height as of 1/23/25: 1.727 m (5' 8\").    Weight as of this encounter: 72.7 kg (160 lb 3.2 oz). Body surface area is 1.87 meters squared.  No Pain (0) Comment: Data Unavailable   No LMP for male patient.  Allergies reviewed: Yes  Medications reviewed: Yes    Medications: Medication refills not needed today.  Pharmacy name entered into [employer]:    [facility] - [place], [place] - [address]  [id number] - [place], [place] - [address]    Frailty Screening:   Is the patient here for a new oncology consult visit in cancer care? 2. No    PHQ9:  Did this patient require a PHQ9?: No      Clinical concerns: none      Shabbir Seaman LPN            "

## 2025-02-27 NOTE — NURSING NOTE
Chief Complaint   Patient presents with    Blood Draw     Labs drawn via  by RN. VS taken.     Labs collected from venipuncture by RN. Vitals taken. Checked in for appointment(s).     Jayda Leonard RN

## 2025-03-01 LAB — TESTOST SERPL-MCNC: 275 NG/DL (ref 240–950)

## 2025-03-04 ENCOUNTER — PATIENT OUTREACH (OUTPATIENT)
Dept: ONCOLOGY | Facility: CLINIC | Age: 70
End: 2025-03-04
Payer: MEDICARE

## 2025-03-04 NOTE — PROGRESS NOTES
[facility]: Cancer Care                                                                                          Called [facility] to retrieve radiation records.  Provided fax number.    Derrick Brennan   [facility]  [phone] (Work)  [address]  [address] [address]    Addendum:  Received fax reporting no radiation treatment summary is available.  Per fax, called [facility] team at [phone].  Spoke with RN who sent a message to medical records team to fax all radiation records.    Simona LOUIS RN  Cancer Care Coordinator  [facility]

## 2025-03-06 ENCOUNTER — ANCILLARY PROCEDURE (OUTPATIENT)
Dept: BONE DENSITY | Facility: CLINIC | Age: 70
End: 2025-03-06
Attending: INTERNAL MEDICINE
Payer: MEDICARE

## 2025-03-06 DIAGNOSIS — Z79.818 ANDROGEN DEPRIVATION THERAPY: ICD-10-CM

## 2025-03-06 DIAGNOSIS — C61 PROSTATE CANCER (H): ICD-10-CM

## 2025-03-06 DIAGNOSIS — C79.51 METASTASIS TO BONE (H): ICD-10-CM

## 2025-03-25 ENCOUNTER — PATIENT OUTREACH (OUTPATIENT)
Dept: ONCOLOGY | Facility: CLINIC | Age: 70
End: 2025-03-25
Payer: MEDICARE

## 2025-03-25 DIAGNOSIS — C61 PROSTATE CANCER (H): ICD-10-CM

## 2025-03-25 DIAGNOSIS — C79.51 METASTASIS TO BONE (H): ICD-10-CM

## 2025-03-25 DIAGNOSIS — C61 PROSTATE CANCER (H): Primary | ICD-10-CM

## 2025-03-25 RX ORDER — BICALUTAMIDE 50 MG/1
50 TABLET, FILM COATED ORAL DAILY
Qty: 21 TABLET | Refills: 0 | Status: SHIPPED | OUTPATIENT
Start: 2025-03-25

## 2025-03-25 NOTE — PROGRESS NOTES
[facility]: Cancer Care                                                                                          Patient called reporting he is ready to start treatment.  Message sent to MD to advise.  Notified patient via [employer] plan is pending.    Addendum:  Patient to begin bicalutamide x21 days on the day of the first leuprolide injection.  After 21 day course is complete, switch to enzalutamide.  Sent patient [employer]wl[employer] message with info.    Simona LOUIS RN  Cancer Care Coordinator  [facility]

## 2025-04-01 ENCOUNTER — INFUSION THERAPY VISIT (OUTPATIENT)
Dept: ONCOLOGY | Facility: CLINIC | Age: 70
End: 2025-04-01
Attending: INTERNAL MEDICINE
Payer: MEDICARE

## 2025-04-01 VITALS
BODY MASS INDEX: 24.97 KG/M2 | RESPIRATION RATE: 16 BRPM | TEMPERATURE: 97.3 F | OXYGEN SATURATION: 96 % | SYSTOLIC BLOOD PRESSURE: 150 MMHG | HEART RATE: 67 BPM | WEIGHT: 164.2 LBS | DIASTOLIC BLOOD PRESSURE: 79 MMHG

## 2025-04-01 DIAGNOSIS — C79.51 METASTASIS TO BONE (H): ICD-10-CM

## 2025-04-01 DIAGNOSIS — C61 PROSTATE CANCER (H): Primary | ICD-10-CM

## 2025-04-01 LAB
ALBUMIN SERPL BCG-MCNC: 4 G/DL (ref 3.5–5.2)
ALP SERPL-CCNC: 65 U/L (ref 40–150)
ALT SERPL W P-5'-P-CCNC: 24 U/L (ref 0–70)
ANION GAP SERPL CALCULATED.3IONS-SCNC: 8 MMOL/L (ref 7–15)
AST SERPL W P-5'-P-CCNC: 27 U/L (ref 0–45)
BASOPHILS # BLD AUTO: 0.1 10E3/UL (ref 0–0.2)
BASOPHILS NFR BLD AUTO: 1 %
BILIRUB SERPL-MCNC: 0.6 MG/DL
BUN SERPL-MCNC: 13.1 MG/DL (ref 8–23)
CALCIUM SERPL-MCNC: 8.9 MG/DL (ref 8.8–10.4)
CHLORIDE SERPL-SCNC: 106 MMOL/L (ref 98–107)
CREAT SERPL-MCNC: 1.05 MG/DL (ref 0.67–1.17)
EGFRCR SERPLBLD CKD-EPI 2021: 77 ML/MIN/1.73M2
EOSINOPHIL # BLD AUTO: 0.7 10E3/UL (ref 0–0.7)
EOSINOPHIL NFR BLD AUTO: 16 %
ERYTHROCYTE [DISTWIDTH] IN BLOOD BY AUTOMATED COUNT: 13.4 % (ref 10–15)
GLUCOSE SERPL-MCNC: 108 MG/DL (ref 70–99)
HCO3 SERPL-SCNC: 25 MMOL/L (ref 22–29)
HCT VFR BLD AUTO: 38.3 % (ref 40–53)
HGB BLD-MCNC: 12.8 G/DL (ref 13.3–17.7)
IMM GRANULOCYTES # BLD: 0 10E3/UL
IMM GRANULOCYTES NFR BLD: 0 %
LYMPHOCYTES # BLD AUTO: 0.9 10E3/UL (ref 0.8–5.3)
LYMPHOCYTES NFR BLD AUTO: 20 %
MCH RBC QN AUTO: 30.6 PG (ref 26.5–33)
MCHC RBC AUTO-ENTMCNC: 33.4 G/DL (ref 31.5–36.5)
MCV RBC AUTO: 92 FL (ref 78–100)
MONOCYTES # BLD AUTO: 0.4 10E3/UL (ref 0–1.3)
MONOCYTES NFR BLD AUTO: 9 %
NEUTROPHILS # BLD AUTO: 2.4 10E3/UL (ref 1.6–8.3)
NEUTROPHILS NFR BLD AUTO: 54 %
NRBC # BLD AUTO: 0 10E3/UL
NRBC BLD AUTO-RTO: 0 /100
PLATELET # BLD AUTO: 179 10E3/UL (ref 150–450)
POTASSIUM SERPL-SCNC: 4 MMOL/L (ref 3.4–5.3)
PROT SERPL-MCNC: 6.1 G/DL (ref 6.4–8.3)
PSA SERPL DL<=0.01 NG/ML-MCNC: 3.42 NG/ML (ref 0–4.5)
RBC # BLD AUTO: 4.18 10E6/UL (ref 4.4–5.9)
SODIUM SERPL-SCNC: 139 MMOL/L (ref 135–145)
WBC # BLD AUTO: 4.4 10E3/UL (ref 4–11)

## 2025-04-01 PROCEDURE — 36415 COLL VENOUS BLD VENIPUNCTURE: CPT

## 2025-04-01 PROCEDURE — 82310 ASSAY OF CALCIUM: CPT

## 2025-04-01 PROCEDURE — 84155 ASSAY OF PROTEIN SERUM: CPT

## 2025-04-01 PROCEDURE — 96402 CHEMO HORMON ANTINEOPL SQ/IM: CPT

## 2025-04-01 PROCEDURE — 84153 ASSAY OF PSA TOTAL: CPT

## 2025-04-01 PROCEDURE — 85004 AUTOMATED DIFF WBC COUNT: CPT

## 2025-04-01 PROCEDURE — 84403 ASSAY OF TOTAL TESTOSTERONE: CPT

## 2025-04-01 PROCEDURE — 250N000011 HC RX IP 250 OP 636: Mod: JZ | Performed by: INTERNAL MEDICINE

## 2025-04-01 PROCEDURE — 82247 BILIRUBIN TOTAL: CPT

## 2025-04-01 RX ADMIN — LEUPROLIDE ACETATE 22.5 MG: 22.5 INJECTION, SUSPENSION, EXTENDED RELEASE SUBCUTANEOUS at 08:46

## 2025-04-01 ASSESSMENT — PAIN SCALES - GENERAL: PAINLEVEL_OUTOF10: NO PAIN (0)

## 2025-04-01 NOTE — PROGRESS NOTES
Infusion Injection Note:  Inocente Rios presents today for Eligard Injection.    Patient seen by provider today: No   present during visit today: Not Applicable.    Patient declined to speak with an RN today.     Treatment Conditions:  Not Applicable.    Pre and Post Injection:  Eligard injection given to Right Arm  without incident.   Patient tolerated procedure well..    Discharge Plan:  Patient discharged in stable condition accompanied by: self.  Patient will return 5/5 for next appointment.

## 2025-04-01 NOTE — NURSING NOTE
Chief Complaint   Patient presents with    Blood Draw     Labs drawn via  by RN in lab.  VS taken       Labs collected from venipuncture by RN. Vitals taken. Checked in for appointment(s).    Carey Carmona RN

## 2025-04-02 ENCOUNTER — NURSE TRIAGE (OUTPATIENT)
Dept: ONCOLOGY | Facility: CLINIC | Age: 70
End: 2025-04-02
Payer: MEDICARE

## 2025-04-02 ENCOUNTER — TELEPHONE (OUTPATIENT)
Dept: CARDIOLOGY | Facility: CLINIC | Age: 70
End: 2025-04-02

## 2025-04-02 ENCOUNTER — ANCILLARY PROCEDURE (OUTPATIENT)
Dept: CARDIOLOGY | Facility: CLINIC | Age: 70
End: 2025-04-02
Attending: INTERNAL MEDICINE
Payer: MEDICARE

## 2025-04-02 DIAGNOSIS — I49.01 VF (VENTRICULAR FIBRILLATION) (H): ICD-10-CM

## 2025-04-02 DIAGNOSIS — Z95.810 ICD (IMPLANTABLE CARDIOVERTER-DEFIBRILLATOR) IN PLACE: ICD-10-CM

## 2025-04-02 LAB
MDC_IDC_EPISODE_DTM: NORMAL
MDC_IDC_EPISODE_ID: NORMAL
MDC_IDC_EPISODE_TYPE: NORMAL
MDC_IDC_LEAD_CONNECTION_STATUS: NORMAL
MDC_IDC_LEAD_IMPLANT_DT: NORMAL
MDC_IDC_LEAD_LOCATION: NORMAL
MDC_IDC_LEAD_LOCATION_DETAIL_1: NORMAL
MDC_IDC_LEAD_MFG: NORMAL
MDC_IDC_LEAD_MODEL: NORMAL
MDC_IDC_LEAD_POLARITY_TYPE: NORMAL
MDC_IDC_LEAD_SERIAL: NORMAL
MDC_IDC_MSMT_BATTERY_DTM: NORMAL
MDC_IDC_MSMT_BATTERY_REMAINING_LONGEVITY: 162 MO
MDC_IDC_MSMT_BATTERY_REMAINING_PERCENTAGE: 100 %
MDC_IDC_MSMT_BATTERY_STATUS: NORMAL
MDC_IDC_MSMT_CAP_CHARGE_DTM: NORMAL
MDC_IDC_MSMT_CAP_CHARGE_TIME: 9.8 S
MDC_IDC_MSMT_CAP_CHARGE_TYPE: NORMAL
MDC_IDC_MSMT_LEADCHNL_RV_IMPEDANCE_VALUE: 733 OHM
MDC_IDC_PG_IMPLANT_DTM: NORMAL
MDC_IDC_PG_MFG: NORMAL
MDC_IDC_PG_MODEL: NORMAL
MDC_IDC_PG_SERIAL: NORMAL
MDC_IDC_PG_TYPE: NORMAL
MDC_IDC_SESS_CLINIC_NAME: NORMAL
MDC_IDC_SESS_DTM: NORMAL
MDC_IDC_SESS_TYPE: NORMAL
MDC_IDC_SET_BRADY_LOWRATE: 40 {BEATS}/MIN
MDC_IDC_SET_BRADY_MODE: NORMAL
MDC_IDC_SET_LEADCHNL_RV_PACING_AMPLITUDE: 2.8 V
MDC_IDC_SET_LEADCHNL_RV_PACING_CAPTURE_MODE: NORMAL
MDC_IDC_SET_LEADCHNL_RV_PACING_POLARITY: NORMAL
MDC_IDC_SET_LEADCHNL_RV_PACING_PULSEWIDTH: 1 MS
MDC_IDC_SET_LEADCHNL_RV_SENSING_ADAPTATION_MODE: NORMAL
MDC_IDC_SET_LEADCHNL_RV_SENSING_POLARITY: NORMAL
MDC_IDC_SET_LEADCHNL_RV_SENSING_SENSITIVITY: 0.6 MV
MDC_IDC_SET_ZONE_DETECTION_INTERVAL: 250 MS
MDC_IDC_SET_ZONE_DETECTION_INTERVAL: 324 MS
MDC_IDC_SET_ZONE_STATUS: NORMAL
MDC_IDC_SET_ZONE_STATUS: NORMAL
MDC_IDC_SET_ZONE_TYPE: NORMAL
MDC_IDC_SET_ZONE_TYPE: NORMAL
MDC_IDC_SET_ZONE_VENDOR_TYPE: NORMAL
MDC_IDC_SET_ZONE_VENDOR_TYPE: NORMAL
MDC_IDC_STAT_BRADY_DTM_END: NORMAL
MDC_IDC_STAT_BRADY_DTM_START: NORMAL
MDC_IDC_STAT_BRADY_RV_PERCENT_PACED: 0 %
MDC_IDC_STAT_EPISODE_RECENT_COUNT: 0
MDC_IDC_STAT_EPISODE_RECENT_COUNT_DTM_END: NORMAL
MDC_IDC_STAT_EPISODE_RECENT_COUNT_DTM_START: NORMAL
MDC_IDC_STAT_EPISODE_TYPE: NORMAL
MDC_IDC_STAT_EPISODE_VENDOR_TYPE: NORMAL
MDC_IDC_STAT_TACHYTHERAPY_ATP_DELIVERED_RECENT: 0
MDC_IDC_STAT_TACHYTHERAPY_ATP_DELIVERED_TOTAL: 0
MDC_IDC_STAT_TACHYTHERAPY_RECENT_DTM_END: NORMAL
MDC_IDC_STAT_TACHYTHERAPY_RECENT_DTM_START: NORMAL
MDC_IDC_STAT_TACHYTHERAPY_SHOCKS_ABORTED_RECENT: 0
MDC_IDC_STAT_TACHYTHERAPY_SHOCKS_ABORTED_TOTAL: 0
MDC_IDC_STAT_TACHYTHERAPY_SHOCKS_DELIVERED_RECENT: 0
MDC_IDC_STAT_TACHYTHERAPY_SHOCKS_DELIVERED_TOTAL: 0
MDC_IDC_STAT_TACHYTHERAPY_TOTAL_DTM_END: NORMAL
MDC_IDC_STAT_TACHYTHERAPY_TOTAL_DTM_START: NORMAL

## 2025-04-02 NOTE — TELEPHONE ENCOUNTER
Oncology Nurse Triage    Situation:   Inocente reporting the following symptoms:  -twitching feeling in lower rib cage level on right side, Internal feeling not on surface.   (Like a vibration of a cell phone in pocket)     Background:   Treating Provider:   Dr. Chavez    Date of last office visit: 2/27/2025 Dr. Chavez    Recent Treatments:4/1/2025 leuprolide    Assessment:     Onset: Started yesterday afternoon, intermittent random occurrence, lasting about 20seconds.     Location: Twitching/cell phone vibration feeling behind right side, lower rib cage. Does not feel in stomach/abdomen. (Feels inside/internal) maybe in rhythm with heart but unsure.     Pt has history of cardiac arrest and an ICD/Defibrillator.     Denies swelling in BLE. Wears compression stocks for varicose veins.     Denies fever, chest pain, SOB, headaches, dizziness.     Pt wondering if could be side effect or could be cardiology related?     Recommendations:   This writer discussed given patient extensive cardiac history, it would still be worth calling cardiac care team to follow up on pt's concern for cardiac related event.   0900 Paged provider Dr. Chavez.     0984 Per Dr. Chavez, symptoms pt describing unlikely related to leuprolide and agrees with this writer that pt pursue following up with cardiac provider/care team.     4771 This writer spoke to Inocente who is waiting a call back from cardiology.

## 2025-04-02 NOTE — TELEPHONE ENCOUNTER
"Encounter Type: Courtesy remote for patient report's of fluttering.   Device: BOSCI, Suzyate (S) ICD  Presenting: VS @ 85 - 95 bpm   Findings: since 1/30/2025: no arrhythmia noted, stable lead measurements, and a good distribution of heart rates (primarily  bpm)   Plan: will route to Dr. Cronin's team and EP-RN with today's findings.   Device follow up for the entirety of having the Device, based on best practices determined by Heart Rhythm Society and in Compliance with Medicare guidelines. Continue remote device monitoring per patient plan.  WILLAM Walsh RN    Patient called [facility] with concerns of a vibrating feeling in his rib cage, at random times during the day (see previous EPIC note from today).    Per chart review: Patient has Single RV lead, ICD w/recent gen change January, 2025 and chronic RV lead from 2015. Patient follows with Roseanne Mackenzie, last seen in office 3/13/2023.     Device clinic called patient, who stated, \"Its actually a fluttering feeling that randomly happens through out the day.\" Patient then sent remote transmission.    Remote transmission shows normal device function, stable lead measurements, and no arhythmia noted.     Updated patient with remote findings. Patient asked, :well what are the next steps then because I would like to per james this further.\" Told patient I would route today's conversation to his general cardiologist/nursing team to follow up. Understanding expressed.     Shiva Walsh RN on 4/2/2025 at 11:50 AM    "

## 2025-04-02 NOTE — PROGRESS NOTES
Spoke with Pt regarding call back request, he did see his pcp yesterday and had EKG done. Normal results per Pt. Completed at [facility]. He feels the symptoms may be more related to his prostate cancer and his current oncology medication regimen. He is wanting to see Dr. Cronin sooner than July. Transferred to scheduling. records from [facility] are available.-dangelo    Pt due for annual visit. Message sent to scheduling-    From: Robyn Rao RN  Sent: 4/2/2025  12:13 PM CDT  To: [facility] Cv Rn Team Y    No arrhythmia noted on device, will defer to general cardiologist.    Robyn  ----- Message -----  From: Shiva Walsh, KATHERIN  Sent: 4/2/2025  11:55 AM CDT  To: Robyn Rao RN; [facility] Cv Rn Team Y    ----- Message from Shiva Walsh RN sent at 4/2/2025 11:55 AM CDT -----  Normal findings on remote device check. Patient requesting input from Dr. Cronin.

## 2025-04-03 LAB — TESTOST SERPL-MCNC: 193 NG/DL (ref 240–950)

## 2025-04-10 ENCOUNTER — TELEPHONE (OUTPATIENT)
Dept: ONCOLOGY | Facility: CLINIC | Age: 70
End: 2025-04-10
Payer: MEDICARE

## 2025-04-10 DIAGNOSIS — C79.51 METASTASIS TO BONE (H): Primary | ICD-10-CM

## 2025-04-10 DIAGNOSIS — C61 PROSTATE CANCER (H): ICD-10-CM

## 2025-04-10 RX ORDER — PROCHLORPERAZINE MALEATE 5 MG/1
5 TABLET ORAL EVERY 6 HOURS PRN
Qty: 30 TABLET | Refills: 2 | Status: SHIPPED | OUTPATIENT
Start: 2025-04-10

## 2025-04-10 NOTE — ORAL ONC MGMT
Oral Chemotherapy Monitoring Program    Lab Monitoring Plan    Labs drawn outside of [place]: n/a  Subjective/Objective:  Inocente Rios is a 69 year old male contacted by phone for an initial visit for oral chemotherapy education. Patient received Lupron injection on 4/1, and started bicalutamide the same day. He will finish 21-day course of bicalutamide 4/21 and begin enzalutamide on 4/22. Reviewed dosing, frequency, storage, side effects. 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 Requested patient send me a So1 message with names of supplements and patient is happy to do so. Reassured patient that many of our patients take natural supplements in addition to their treatment and we are accepting of these products. Patient relieved. All questions answered.    Assessment:  Patient is appropriate to start therapy once bicalutamide complete.    Plan:  Basic chemotherapy teaching was reviewed with the patient including indication, start date of therapy, dose, administration, adverse effects, missed doses, food and drug interactions, monitoring, side effect management, office contact information, and safe handling. Written materials were provided and all questions answered.    Follow-Up:  4/29 for 1-week initial assessment     Laura Santiago PharmD  Oral Chemotherapy Monitoring Program  [facility]  [phone]         1/17/2025    11:00 AM 4/10/2025    11:00 AM   ORAL CHEMOTHERAPY   Assessment Type Initial Work up New Teach;Refill   Diagnosis Code Prostate Cancer Prostate Cancer   Providers [facility] Name/Location [facility]   Is this patient followed by the [facility] OC team? No No   Drug Name Xtandi (enzalutamide) Xtandi (enzalutamide)   Dose 160 mg 160 mg   Current Schedule Daily Daily   Cycle Details Continuous Continuous   Planned next cycle start date  4/22/2025   Any new drug interactions?  No   Is the dose as ordered  "appropriate for the patient?  Yes   Was a medication prescribed as part of a CPA?  No       Last PHQ-2 Score on record:       2/27/2025     9:33 AM 1/3/2020     8:07 AM   PHQ-2 ( 1999 [employer])   Q1: Little interest or pleasure in doing things 1 0   Q2: Feeling down, depressed or hopeless 1 0   PHQ-2 Score 2 0   PHQ-2 Total Score (12-17 Years)- Positive if 3 or more points; Administer PHQ-A if positive  0       Vitals:  BP:   BP Readings from Last 1 Encounters:   04/01/25 (!) 150/79     Wt Readings from Last 1 Encounters:   04/01/25 74.5 kg (164 lb 3.2 oz)     Estimated body surface area is 1.89 meters squared as calculated from the following:    Height as of 2/27/25: 1.727 m (5' 7.99\").    Weight as of 4/1/25: 74.5 kg (164 lb 3.2 oz).    Labs:  _  Result Component Current Result Ref Range   Sodium 139 (4/1/2025) 135 - 145 mmol/L     _  Result Component Current Result Ref Range   Potassium 4.0 (4/1/2025) 3.4 - 5.3 mmol/L     _  Result Component Current Result Ref Range   Calcium 8.9 (4/1/2025) 8.8 - 10.4 mg/dL     No results found for Mag within last 30 days.     No results found for Phos within last 30 days.     _  Result Component Current Result Ref Range   Albumin 4.0 (4/1/2025) 3.5 - 5.2 g/dL     _  Result Component Current Result Ref Range   Urea Nitrogen 13.1 (4/1/2025) 8.0 - 23.0 mg/dL     _  Result Component Current Result Ref Range   Creatinine 1.05 (4/1/2025) 0.67 - 1.17 mg/dL     _  Result Component Current Result Ref Range   AST 27 (4/1/2025) 0 - 45 U/L     _  Result Component Current Result Ref Range   ALT 24 (4/1/2025) 0 - 70 U/L     _  Result Component Current Result Ref Range   Bilirubin Total 0.6 (4/1/2025) <=1.2 mg/dL     _  Result Component Current Result Ref Range   WBC Count 4.4 (4/1/2025) 4.0 - 11.0 10e3/uL     _  Result Component Current Result Ref Range   Hemoglobin 12.8 (L) (4/1/2025) 13.3 - 17.7 g/dL     _  Result Component Current Result Ref Range   Platelet Count 179 (4/1/2025) 150 - 450 " 10e3/uL     No results found for ANC within last 30 days.     _  Result Component Current Result Ref Range   Absolute Neutrophils 2.4 (4/1/2025) 1.6 - 8.3 10e3/uL

## 2025-04-16 ENCOUNTER — PATIENT OUTREACH (OUTPATIENT)
Dept: ONCOLOGY | Facility: CLINIC | Age: 70
End: 2025-04-16

## 2025-04-16 ENCOUNTER — OFFICE VISIT (OUTPATIENT)
Dept: CARDIOLOGY | Facility: CLINIC | Age: 70
End: 2025-04-16
Attending: INTERNAL MEDICINE
Payer: MEDICARE

## 2025-04-16 VITALS
HEIGHT: 68 IN | WEIGHT: 159.2 LBS | OXYGEN SATURATION: 97 % | DIASTOLIC BLOOD PRESSURE: 68 MMHG | HEART RATE: 72 BPM | BODY MASS INDEX: 24.13 KG/M2 | RESPIRATION RATE: 16 BRPM | SYSTOLIC BLOOD PRESSURE: 114 MMHG

## 2025-04-16 DIAGNOSIS — I25.10 CORONARY ARTERY DISEASE INVOLVING NATIVE CORONARY ARTERY OF NATIVE HEART WITHOUT ANGINA PECTORIS: ICD-10-CM

## 2025-04-16 DIAGNOSIS — Z95.810 ICD (IMPLANTABLE CARDIOVERTER-DEFIBRILLATOR) IN PLACE: ICD-10-CM

## 2025-04-16 PROCEDURE — 3074F SYST BP LT 130 MM HG: CPT | Performed by: INTERNAL MEDICINE

## 2025-04-16 PROCEDURE — 99214 OFFICE O/P EST MOD 30 MIN: CPT | Performed by: INTERNAL MEDICINE

## 2025-04-16 PROCEDURE — G2211 COMPLEX E/M VISIT ADD ON: HCPCS | Performed by: INTERNAL MEDICINE

## 2025-04-16 PROCEDURE — 3078F DIAST BP <80 MM HG: CPT | Performed by: INTERNAL MEDICINE

## 2025-04-16 RX ORDER — LEUPROLIDE ACETATE 22.5 MG
KIT INTRAMUSCULAR
COMMUNITY

## 2025-04-16 RX ORDER — KETOCONAZOLE 20 MG/G
CREAM TOPICAL
COMMUNITY

## 2025-04-16 NOTE — PATIENT INSTRUCTIONS
1.Stop taking the atorvastatin for two months. Resume after that and call in with  follow up.  2.No change to other meds  3. Follow up 1 year

## 2025-04-16 NOTE — PROGRESS NOTES
HEART CARE ENCOUNTER NOTE      [facility]  [phone]      Assessment/Recommendation   Assessment:   1.CAD, with history of anterior MI 2015, PCI/stent to LAD. Subsequent out of hospital Vfib arrest POD6 treated with citizen CPR, hypothermia, with complete recovery.   A. S/p ICD implant; replace generator 1/20/2025  B. Normal Stress MPI 2019  C. No angina  2.  Right-sided chest abdominal fluttering sensation etiology undetermined, currently resolved            Plan:   1.  No change to current medical regimen.  2.  Continue to hold the atorvastatin for a full 2 months and then resume.  If symptoms recur off atorvastatin then I would conclude the atorvastatin was not the etiology of these no symptoms and he should go back on at his previous dose.  If symptoms do not recur then resume the atorvastatin and observe to see for recurrence.  If symptoms then recur then we will discontinue the atorvastatin permanently and consider substituting alternate antihyperlipidemic therapy.  3.  Routine follow-up in 1 year    The longitudinal plan of care for the diagnosis(es)/condition(s) as documented were addressed during this visit. Due to the added complexity in care, I will continue to support Inocente in the subsequent management and with ongoing continuity of care.         History of Present Illness/Subjective    HPI: Inocente Rios is a 69 year old male with a history of anterior MI in 2015 treated with primary PCI and stent implant to the LAD.  He was doing well in the 6 postoperative day after he was home he sustained a V-fib out-of-hospital arrest.  He was treated with Citizen CPR and hypothermia.  His stents were widely patent.  An ICD was implanted.  He is done well since then has had no recurrent problems.  His last stress MPI was in 2019 which showed normal function and no evidence of inducible ischemia.   His ICD reached end-of-life and was replaced by Dr. Lundberg in January 2025.  He had no  "postoperative problems.  He recently learned that he has had progression of prostate carcinoma and recently started additional therapy including Lupron injections.  Shortly after his first Lupron injection he began to develop fluttering sensation of the right side of his lower chest and upper abdomen.  He saw Dr. Holly a few days ago regarding this problem.  His EKG was unremarkable and physical exam was normal.  There is no suggestion of a cardiac or GI etiology.  After consulting with his osteopathic physician in [place] he decided to stop taking his statin medicine thinking that this may be contributing to hepatic stress on top of his chemotherapy.  He has been off his atorvastatin for about a week now.  He has not had recurrence of the right lower chest fluttering sensation since the initial episode 2 weeks ago.  He returns today for routine follow-up.  He feels well.  He denies any new symptoms.  Specifically he has no chest pain lightheadedness palpitations or decrease in exercise tolerance.      Studies reviewed:  EC2025 from entire personally reviewed normal sinus rhythm, appears normal           Physical Examination  Review of Systems   /68 (BP Location: Right arm, Patient Position: Sitting, Cuff Size: Adult Regular)   Pulse 72   Resp 16   Ht 1.727 m (5' 8\")   Wt 72.2 kg (159 lb 3.2 oz)   SpO2 97%   BMI 24.21 kg/m    Body mass index is 24.21 kg/m .  Wt Readings from Last 3 Encounters:   25 72.2 kg (159 lb 3.2 oz)   25 74.5 kg (164 lb 3.2 oz)   25 72.7 kg (160 lb 3.2 oz)       General Appearance:   Pleasant gentleman appears stated age. no acute distress, normal body habitus   ENT/Mouth: Oropharynx normal    EYES:  no scleral icterus, normal conjunctivae. No eyelid xanthelasma   Neck: No cervical lymphadenopathy  Thyroid not enlarged or nodular  No JVD   Respiratory:   lungs clear , no rales or wheezing, Normal chest wall expansion    Cardiovascular:   Normal rhythm, " regular rate. Normal 1st and 2nd heart sounds.  No murmurs, no rubs or gallops;   radial pulses are full and equal   Jugular venous pressure is not elevated   Abdomen/GI:  No tenderness, no organomegaly, no pulsatile masses. NBS.   Extremities: No cyanosis or clubbing.  No peripheral edema   Skin: No rash or lesions   Heme/lymph/ Immunology No lymphadenopathy, petechiae   Neurologic: Alert and oriented. No focal motor weakness, gait appears normal.       Psychiatric: Pleasant, calm, appropriate affect.          No known hepatic, renal, pulmonary, or CNS disorders. The remainder of the complete ROS is negative except as noted above.         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    Asthma     Cardiac arrest (H) 7/16/15    Cardiomyopathy (H)     Cardiomyopathy, ischemic     Congestive heart failure, unspecified     Coronary artery disease     Coronary artery disease     Depressive disorder     High cholesterol     Hyperlipidemia     Keratosis     MI (myocardial infarction) (H) 7/10/15    anterior    BECKI (obstructive sleep apnea)     no CPAP    Prostate cancer (H) 3/30/2022    Uncomplicated asthma      Past Surgical History:   Procedure Laterality Date    CARDIAC CATHETERIZATION      CORONARY STENT PLACEMENT      EP ICD GENERATOR REPLACEMENT SINGLE N/A 1/23/2025    Procedure: Implantable Cardioverter Defibrillator Generator Replacement Single;  Surgeon: Amrit Lundberg MD;  Location: [facility] LAB CV    EP ICD INSERT      HERNIA REPAIR      INSERT / REPLACE / REMOVE PACEMAKER      LAPAROSCOPIC APPENDECTOMY N/A 1/29/2023    Procedure: APPENDECTOMY, LAPAROSCOPIC;  Surgeon: Tung Rodas MD;  Location:  OR    LAPAROSCOPIC HERNIORRHAPHY INGUINAL Bilateral 11/21/2016    Procedure: LAPAROSCOPIC BILATERAL INGUINAL HERNIA REPAIR;  Surgeon: Yung Anderson MD;  Location: [facility];  Service:      OTHER SURGICAL HISTORY  7/10/15    coronary stentsmid lad    OTHER SURGICAL HISTORY  7/22/15    icd dual, [facility]    PICC  7/18/2015          Family History   Problem Relation Age of Onset    Prostate Cancer Father     Diabetes No family hx of     Coronary Artery Disease No family hx of     Parkinsonism Mother     Cancer Father     No Known Problems Sister     No Known Problems Sister         Social History     Socioeconomic History    Marital status:      Spouse name: Not on file    Number of children: Not on file    Years of education: Not on file    Highest education level: Not on file   Occupational History    Not on file   Tobacco Use    Smoking status: Never    Smokeless tobacco: Never   Vaping Use    Vaping status: Never Used   Substance and Sexual Activity    Alcohol use: Yes     Alcohol/week: 7.0 standard drinks of alcohol    Drug use: No    Sexual activity: Yes     Partners: Female   Other Topics Concern    Not on file   Social History Narrative    Not on file     Social Drivers of Health     Financial Resource Strain: High Risk (1/1/2022)    Received from [facility], [facility]    Financial Resource Strain     Difficulty of Paying Living Expenses: Not on file     Difficulty of Paying Living Expenses: Not on file   Food Insecurity: Not on file   Transportation Needs: Not on file   Physical Activity: Not on file   Stress: Not on file   Social Connections: Unknown (1/1/2022)    [facility], [facility]    Social Connections     Frequency of Communication with Friends and Family: Not on file   Interpersonal Safety: Low Risk  (1/23/2025)    Interpersonal Safety     Do you feel physically and emotionally safe where you currently live?: Yes     Within the past 12 months, have you been hit, slapped, kicked or otherwise physically hurt by someone?: No      Within the past 12 months, have you been humiliated or emotionally abused in other ways by your partner or ex-partner?: No   Housing Stability: Not on file           Medications  Allergies   Current Outpatient Medications   Medication Sig Dispense Refill    albuterol (PROAIR HFA/PROVENTIL HFA/VENTOLIN HFA) 108 (90 Base) MCG/ACT inhaler Inhale 2 puffs into the lungs every 4 hours as needed for shortness of breath / dyspnea or wheezing 18 g 1    ASPIRIN PO Take 81 mg by mouth      atorvastatin (LIPITOR) 40 MG tablet TAKE 1 TABLET (40 MG TOTAL) BY MOUTH DAILY. 90 tablet 0    metoprolol succinate ER (TOPROL XL) 25 MG 24 hr tablet Take 1 tablet (25 mg) by mouth daily. 90 tablet 3    PERMETHRIN EX 20-30 mmHg thigh high compression stockings.  Wear as directed.      tadalafil (CIALIS) 20 MG tablet Take 10 mg by mouth twice a week      bicalutamide (CASODEX) 50 MG tablet Take 1 tablet (50 mg) by mouth daily. Begin at the time of the first leuprolide injection. (Patient not taking: Reported on 4/16/2025) 21 tablet 0    calcium carbonate-vitamin D (OSCAL) 500-5 MG-MCG tablet Take 1 tablet by mouth 2 times daily. (Patient not taking: Reported on 4/16/2025) 120 tablet 5    [START ON 4/22/2025] enzalutamide (XTANDI) 80 MG tablet Take 2 tablets (160 mg) by mouth daily. (Patient not taking: Reported on 4/16/2025) 60 tablet 0    ketoconazole (NIZORAL) 2 % external cream APPLY DAILY TO SKIN TO AFFECTED AREA TWICE A DAY FOR 2 WEEKS      leuprolide (LUPRON DEPOT, 3-MONTH,) 22.5 MG kit as directed Intramuscular      order for DME Equipment being ordered: Home blood pressure monitor (Patient not taking: Reported on 4/16/2025) 1 Units 0    prochlorperazine (COMPAZINE) 5 MG tablet Take 1 tablet (5 mg) by mouth every 6 hours as needed for nausea or vomiting. (Patient not taking: Reported on 4/16/2025) 30 tablet 2     No Known Allergies       Lab Results    Chemistry/lipid CBC Cardiac Enzymes/BNP/TSH/INR   Recent Labs   Lab Test  "04/17/24  0835   CHOL 142   HDL 68   LDL 63   TRIG 56     Recent Labs   Lab Test 04/17/24  0835 11/10/22  0915 09/10/21  1057   LDL 63 59 52     Recent Labs   Lab Test 04/01/25  0817      POTASSIUM 4.0   CHLORIDE 106   CO2 25   *   BUN 13.1   CR 1.05   GFRESTIMATED 77   ALEENA 8.9     Recent Labs   Lab Test 04/01/25  0817 02/27/25  0927 01/23/25  1217   CR 1.05 0.98 1.10     No results for input(s): \"A1C\" in the last 91153 hours.       Recent Labs   Lab Test 04/01/25  0817   WBC 4.4   HGB 12.8*   HCT 38.3*   MCV 92        Recent Labs   Lab Test 04/01/25  0817 02/27/25  0927 01/23/25  1217   HGB 12.8* 13.0* 13.3    No results for input(s): \"TROPONINI\" in the last 21553 hours.  No results for input(s): \"BNP\", \"NTBNPI\", \"NTBNP\" in the last 60409 hours.  Recent Labs   Lab Test 04/17/24  0835   TSH 6.65*     No results for input(s): \"INR\" in the last 72522 hours.     Joseph Cronin MD                                    "

## 2025-04-16 NOTE — LETTER
4/16/2025    Slade Parisi MD  [address] [place] [address]    RE: Inocente Rios       Dear Colleague,     I had the pleasure of seeing Inocente Rios in the [facility].    HEART CARE ENCOUNTER NOTE      [facility]  [phone]      Assessment/Recommendation   Assessment:   1.CAD, with history of anterior MI 2015, PCI/stent to LAD. Subsequent out of hospital Vfib arrest POD6 treated with citizen CPR, hypothermia, with complete recovery.   A. S/p ICD implant; replace generator 1/20/2025  B. Normal Stress MPI 2019  C. No angina  2.  Right-sided chest abdominal fluttering sensation etiology undetermined, currently resolved            Plan:   1.  No change to current medical regimen.  2.  Continue to hold the atorvastatin for a full 2 months and then resume.  If symptoms recur off atorvastatin then I would conclude the atorvastatin was not the etiology of these no symptoms and he should go back on at his previous dose.  If symptoms do not recur then resume the atorvastatin and observe to see for recurrence.  If symptoms then recur then we will discontinue the atorvastatin permanently and consider substituting alternate antihyperlipidemic therapy.  3.  Routine follow-up in 1 year    The longitudinal plan of care for the diagnosis(es)/condition(s) as documented were addressed during this visit. Due to the added complexity in care, I will continue to support Inocente in the subsequent management and with ongoing continuity of care.         History of Present Illness/Subjective    HPI: Inocente Rios is a 69 year old male with a history of anterior MI in 2015 treated with primary PCI and stent implant to the LAD.  He was doing well in the 6 postoperative day after he was home he sustained a V-fib out-of-hospital arrest.  He was treated with Citizen CPR and hypothermia.  His stents were widely patent.  An ICD was implanted.  He is done well since then has  "had no recurrent problems.  His last stress MPI was in  which showed normal function and no evidence of inducible ischemia.   His ICD reached end-of-life and was replaced by Dr. Lundberg in 2025.  He had no postoperative problems.  He recently learned that he has had progression of prostate carcinoma and recently started additional therapy including Lupron injections.  Shortly after his first Lupron injection he began to develop fluttering sensation of the right side of his lower chest and upper abdomen.  He saw Dr. Holly a few days ago regarding this problem.  His EKG was unremarkable and physical exam was normal.  There is no suggestion of a cardiac or GI etiology.  After consulting with his osteopathic physician in [place] he decided to stop taking his statin medicine thinking that this may be contributing to hepatic stress on top of his chemotherapy.  He has been off his atorvastatin for about a week now.  He has not had recurrence of the right lower chest fluttering sensation since the initial episode 2 weeks ago.  He returns today for routine follow-up.  He feels well.  He denies any new symptoms.  Specifically he has no chest pain lightheadedness palpitations or decrease in exercise tolerance.      Studies reviewed:  EC2025 from entire personally reviewed normal sinus rhythm, appears normal           Physical Examination  Review of Systems   /68 (BP Location: Right arm, Patient Position: Sitting, Cuff Size: Adult Regular)   Pulse 72   Resp 16   Ht 1.727 m (5' 8\")   Wt 72.2 kg (159 lb 3.2 oz)   SpO2 97%   BMI 24.21 kg/m    Body mass index is 24.21 kg/m .  Wt Readings from Last 3 Encounters:   25 72.2 kg (159 lb 3.2 oz)   25 74.5 kg (164 lb 3.2 oz)   25 72.7 kg (160 lb 3.2 oz)       General Appearance:   Pleasant gentleman appears stated age. no acute distress, normal body habitus   ENT/Mouth: Oropharynx normal    EYES:  no scleral icterus, normal conjunctivae. " No eyelid xanthelasma   Neck: No cervical lymphadenopathy  Thyroid not enlarged or nodular  No JVD   Respiratory:   lungs clear , no rales or wheezing, Normal chest wall expansion    Cardiovascular:   Normal rhythm, regular rate. Normal 1st and 2nd heart sounds.  No murmurs, no rubs or gallops;   radial pulses are full and equal   Jugular venous pressure is not elevated   Abdomen/GI:  No tenderness, no organomegaly, no pulsatile masses. NBS.   Extremities: No cyanosis or clubbing.  No peripheral edema   Skin: No rash or lesions   Heme/lymph/ Immunology No lymphadenopathy, petechiae   Neurologic: Alert and oriented. No focal motor weakness, gait appears normal.       Psychiatric: Pleasant, calm, appropriate affect.          No known hepatic, renal, pulmonary, or CNS disorders. The remainder of the complete ROS is negative except as noted above.         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     Asthma      Cardiac arrest (H) 7/16/15     Cardiomyopathy (H)      Cardiomyopathy, ischemic      Congestive heart failure, unspecified      Coronary artery disease      Coronary artery disease      Depressive disorder      High cholesterol      Hyperlipidemia      Keratosis      MI (myocardial infarction) (H) 7/10/15    anterior     BECKI (obstructive sleep apnea)     no CPAP     Prostate cancer (H) 3/30/2022     Uncomplicated asthma      Past Surgical History:   Procedure Laterality Date     CARDIAC CATHETERIZATION       CORONARY STENT PLACEMENT       EP ICD GENERATOR REPLACEMENT SINGLE N/A 1/23/2025    Procedure: Implantable Cardioverter Defibrillator Generator Replacement Single;  Surgeon: Amrit Lundberg MD;  Location: [facility] CV     EP ICD INSERT       HERNIA REPAIR       INSERT / REPLACE / REMOVE PACEMAKER       LAPAROSCOPIC APPENDECTOMY N/A 1/29/2023    Procedure: APPENDECTOMY, LAPAROSCOPIC;  Surgeon: Clark  Tung Ceballos MD;  Location: [facility] OR     LAPAROSCOPIC HERNIORRHAPHY INGUINAL Bilateral 11/21/2016    Procedure: LAPAROSCOPIC BILATERAL INGUINAL HERNIA REPAIR;  Surgeon: Yung Anderson MD;  Location: [facility];  Service:      OTHER SURGICAL HISTORY  7/10/15    coronary stentsmid lad     OTHER SURGICAL HISTORY  7/22/15    icd dual, [facility]     PICC  7/18/2015          Family History   Problem Relation Age of Onset     Prostate Cancer Father      Diabetes No family hx of      Coronary Artery Disease No family hx of      Parkinsonism Mother      Cancer Father      No Known Problems Sister      No Known Problems Sister         Social History     Socioeconomic History     Marital status:      Spouse name: Not on file     Number of children: Not on file     Years of education: Not on file     Highest education level: Not on file   Occupational History     Not on file   Tobacco Use     Smoking status: Never     Smokeless tobacco: Never   Vaping Use     Vaping status: Never Used   Substance and Sexual Activity     Alcohol use: Yes     Alcohol/week: 7.0 standard drinks of alcohol     Drug use: No     Sexual activity: Yes     Partners: Female   Other Topics Concern     Not on file   Social History Narrative     Not on file     Social Drivers of Health     Financial Resource Strain: High Risk (1/1/2022)    [facility], [facility]    Financial Resource Strain      Difficulty of Paying Living Expenses: Not on file      Difficulty of Paying Living Expenses: Not on file   Food Insecurity: Not on file   Transportation Needs: Not on file   Physical Activity: Not on file   Stress: Not on file   Social Connections: Unknown (1/1/2022)    Received from [facility], [facility]    Social Connections      Frequency of Communication with Friends and Family: Not on file    Interpersonal Safety: Low Risk  (1/23/2025)    Interpersonal Safety      Do you feel physically and emotionally safe where you currently live?: Yes      Within the past 12 months, have you been hit, slapped, kicked or otherwise physically hurt by someone?: No      Within the past 12 months, have you been humiliated or emotionally abused in other ways by your partner or ex-partner?: No   Housing Stability: Not on file           Medications  Allergies   Current Outpatient Medications   Medication Sig Dispense Refill     albuterol (PROAIR HFA/PROVENTIL HFA/VENTOLIN HFA) 108 (90 Base) MCG/ACT inhaler Inhale 2 puffs into the lungs every 4 hours as needed for shortness of breath / dyspnea or wheezing 18 g 1     ASPIRIN PO Take 81 mg by mouth       atorvastatin (LIPITOR) 40 MG tablet TAKE 1 TABLET (40 MG TOTAL) BY MOUTH DAILY. 90 tablet 0     metoprolol succinate ER (TOPROL XL) 25 MG 24 hr tablet Take 1 tablet (25 mg) by mouth daily. 90 tablet 3     PERMETHRIN EX 20-30 mmHg thigh high compression stockings.  Wear as directed.       tadalafil (CIALIS) 20 MG tablet Take 10 mg by mouth twice a week       bicalutamide (CASODEX) 50 MG tablet Take 1 tablet (50 mg) by mouth daily. Begin at the time of the first leuprolide injection. (Patient not taking: Reported on 4/16/2025) 21 tablet 0     calcium carbonate-vitamin D (OSCAL) 500-5 MG-MCG tablet Take 1 tablet by mouth 2 times daily. (Patient not taking: Reported on 4/16/2025) 120 tablet 5     [START ON 4/22/2025] enzalutamide (XTANDI) 80 MG tablet Take 2 tablets (160 mg) by mouth daily. (Patient not taking: Reported on 4/16/2025) 60 tablet 0     ketoconazole (NIZORAL) 2 % external cream APPLY DAILY TO SKIN TO AFFECTED AREA TWICE A DAY FOR 2 WEEKS       leuprolide (LUPRON DEPOT, 3-MONTH,) 22.5 MG kit as directed Intramuscular       order for DME Equipment being ordered: Home blood pressure monitor (Patient not taking: Reported on 4/16/2025) 1 Units 0     prochlorperazine  "(COMPAZINE) 5 MG tablet Take 1 tablet (5 mg) by mouth every 6 hours as needed for nausea or vomiting. (Patient not taking: Reported on 4/16/2025) 30 tablet 2     No Known Allergies       Lab Results    Chemistry/lipid CBC Cardiac Enzymes/BNP/TSH/INR   Recent Labs   Lab Test 04/17/24  0835   CHOL 142   HDL 68   LDL 63   TRIG 56     Recent Labs   Lab Test 04/17/24  0835 11/10/22  0915 09/10/21  1057   LDL 63 59 52     Recent Labs   Lab Test 04/01/25  0817      POTASSIUM 4.0   CHLORIDE 106   CO2 25   *   BUN 13.1   CR 1.05   GFRESTIMATED 77   ALEENA 8.9     Recent Labs   Lab Test 04/01/25  0817 02/27/25  0927 01/23/25  1217   CR 1.05 0.98 1.10     No results for input(s): \"A1C\" in the last 11621 hours.       Recent Labs   Lab Test 04/01/25  0817   WBC 4.4   HGB 12.8*   HCT 38.3*   MCV 92        Recent Labs   Lab Test 04/01/25  0817 02/27/25  0927 01/23/25  1217   HGB 12.8* 13.0* 13.3    No results for input(s): \"TROPONINI\" in the last 70492 hours.  No results for input(s): \"BNP\", \"NTBNPI\", \"NTBNP\" in the last 10865 hours.  Recent Labs   Lab Test 04/17/24  0835   TSH 6.65*     No results for input(s): \"INR\" in the last 55650 hours.     Joseph Cronin MD                                        Thank you for allowing me to participate in the care of your patient.      Sincerely,     Joseph Cronin MD     [facility]  cc:   Joseph Cronin MD  [address]  [place] [address]      "

## 2025-04-16 NOTE — PROGRESS NOTES
[facility]: Cancer Care                                                                                          Patient called with questions regarding his PAN program before he starts Xtandi.  He would like to know the short-term & long-term plan with the program.  He said he got a funny email that he's not sure if it's legitimate saying his plan might end soon.  Patient reports this is somewhat urgent since he's supposed to start Xtandi on 4/22 and he's not sure he wants to start until he has the finances figured out.  RNCC informed patient will contact financial liaisons to have someone contact him asap.    Simona LOUIS RN  Cancer Care Coordinator  [facility]

## 2025-04-29 NOTE — PROGRESS NOTES
PRIMARY CARE PHYSICIAN: Slade Parisi MD  CARDIOLOGY: Joseph Cronin MD  MEDICAL ONCOLOGY: Jorge Salmon MD  RADIATION ONCOLOGY: Derrick Brennan MD  UROLOGY: Bing Angel MD    HISTORY OF PRESENT ILLNESS: Patient is a 69-year-old male with stage IVB adenocarcinoma prostate (pT3a, pN0, cM1b, PSA 8.6 ng/mL, grade group 3).  Patient was diagnosed with stage III adenocarcinoma prostate (pT3a, pN0, cM0, PSA 8.6 ng/mL, grade group 3), and presented with an elevated PSA of 8.6 ng/mL (01/03/2020). Transrectal ultrasound-guided prostate core needle biopsy 02/27/2020, revealed a [place] 4+3=7 adenocarcinoma involving 75% of the core needle biopsy sample from the right mid medial, 75% of the biopsy sample from the right mid lateral, 90% of the biopsy sample from the right medial base, 70% of the biopsy sample with perineural invasion from the right lateral base.  There is a Hernandez 3+4=7 adenocarcinoma involving 30% of the core needle biopsy sample from the left medial apex, 50% of the biopsy sample from the left lateral apex, 25% of the biopsy sample from the left mid medial, 55% of the biopsy sample from the left medial base.  There was a [place] 3+3=6 adenocarcinoma involving 20% of the core needle biopsy sample from the right medial apex, 5-10% of the biopsy sample from the right lateral apex, 30% of the biopsy sample from the left mid lateral.  There was high-grade prostatic intraepithelial neoplasia with rare adjacent small atypical glands in the core needle biopsy sample from the left lateral base (11/12 biopsy samples involved). CT abdomen, pelvis 05/04/2020, was negative for adenopathy or metastases.  There was heterogeneous enhancement of the prostate with 9 mm enhancing nodule along the right posterior aspect of the prostate.  Bone scan 05/04/2020 was negative for metastases.  There was mild uptake in the bilateral acromioclavicular joints and right knee. Patient underwent robotic-assisted laparoscopic  radical prostatectomy and bilateral pelvic lymph node dissection 05/20/2020, there revealed a [place] 4+3=7 acinar and ductal adenocarcinoma involving 35% of the prostate with the largest focus measuring 1.5 cm.  There was nonfocal extraprostatic extension involving the right posterior prostate.  There was perineural, but no lymphovascular invasion.  There was no bladder neck invasion or seminal vesicle involvement.  Surgical margins were tumor free. Two pelvic lymph nodes were negative for metastases (0/2 lymph nodes involved). NGS testing (Glyde) on the prostatectomy sample (05/20/2020), revealed a CDKN1B exon 1, p.Q65*, c.193C>T, and CREBBP exon 3, p.T291fs, c.870delC pathogenic variants.  There was a CDKN1B exon 1, p.D63V, c.188A>T variant of uncertain significance.  Microsatellite instability status was MS-stable.  Tumor mutational burden was 5 Muts/Mb. gLOH was low at 6%.     PSA was undetectable at <0.1 ng/mL (from 08/12/2020 to 05/27/2021) with subsequent biochemical pression with PSA 0.13 ng/mL (11/11/2021), PSA 0.15 ng/mL (12/03/2021), PSA 0.35 ng/mL (02/22/2022).  Decipher Genomic Risk 12/22/2021) revealed a decipher score of 0.32 (low risk).  18-F-Fluciclovine PET/CT scan 01/10/2022, revealed postsurgical changes related to prior prostatectomy and pelvic lymph node dissection.  There were no suspicious PSMA-avid lesions.  Patient received salvage radiation therapy to the prostate fossa (6800 cGy in 34 fractions) and bilateral pelvic lymph nodes (4500 cGy in 25 fractions) from 04/18/2022 through 06/06/2022, without androgen deprivation therapy (ADT).  There was a PSA alisa of 0.1 ng/mL (12/30/2022) followed by a rising PSA of 0.2 ng/mL (03/30/2023), PSA 0.3 ng/mL (10/24/2023).  PSMA PET/CT scan 10/30/2023, was negative for PSMA-avid lesions.    There was continued biochemical progression with PSA 0.6 ng/mL (01/21/2024), PSA 0.76 ng mL (03/21/2024), PSA 0.97 ng/mL (05/02/2024).  PSMA PET/CT scan  05/24/2024, showed postsurgical changes related to prior prostatectomy.  There were no PSMA-avid metastases.  Patient continued observation with PSA 1.1 ng mL (07/31/2024), PSA 1.92 ng/mL (10/31/2024), PSA 2.2 ng mL (12/03/2024).  PSMA PET/CT scan 12/20/2024, revealed PSMA-avid metastases in the right T6 transverse process and left L2 pedicle. Patient received stereotactic ablative radiation therapy (SABR) to the T6 right transverse process (30 Gy in 3 fractions), T6 vertebra (21 Gy in 30 fractions), L2 left pedicle (30 Gy in 3 fractions), and L2 vertebra (21 Gy in 3 fractions) from 02/18/2025 through 02/20/2025.  Patient is receiving first-line ADT consisting of leuprolide 22.5 mg every 3 months beginning 04/01/2025, and enzalutamide 160 mg daily beginning 04/22/2025.  Patient has fatigue, mild decrease in exercise tolerance, and mild hot flashes.  Patient has impotence since prostate cancer surgery and radiation therapy. There are no other new symptoms or events since the prior clinic visit (02/27/2025).  Patient denies fever, anorexia, weight loss, headache, cough, dyspnea, chest pain, abdominal pain, nausea, vomiting, constipation, diarrhea.     PAST HISTORY: Past history was reviewed and unchanged from the clinic note 01/15/2025.     MEDICATIONS:   Current Outpatient Medications   Medication Sig Dispense Refill    albuterol (PROAIR HFA/PROVENTIL HFA/VENTOLIN HFA) 108 (90 Base) MCG/ACT inhaler Inhale 2 puffs into the lungs every 4 hours as needed for shortness of breath / dyspnea or wheezing 18 g 1    ASPIRIN PO Take 81 mg by mouth      enzalutamide (XTANDI) 80 MG tablet Take 2 tablets (160 mg) by mouth daily. 60 tablet 0    ketoconazole (NIZORAL) 2 % external cream APPLY DAILY TO SKIN TO AFFECTED AREA TWICE A DAY FOR 2 WEEKS      leuprolide (LUPRON DEPOT, 3-MONTH,) 22.5 MG kit as directed Intramuscular      metoprolol succinate ER (TOPROL XL) 25 MG 24 hr tablet Take 1 tablet (25 mg) by mouth daily. 90 tablet 3  "   PERMETHRIN EX 20-30 mmHg thigh high compression stockings.  Wear as directed.      prochlorperazine (COMPAZINE) 5 MG tablet Take 1 tablet (5 mg) by mouth every 6 hours as needed for nausea or vomiting. 30 tablet 2    tadalafil (CIALIS) 20 MG tablet Take 10 mg by mouth twice a week      atorvastatin (LIPITOR) 40 MG tablet TAKE 1 TABLET (40 MG TOTAL) BY MOUTH DAILY. (Patient not taking: Reported on 5/5/2025) 90 tablet 0    calcium carbonate-vitamin D (OSCAL) 500-5 MG-MCG tablet Take 1 tablet by mouth 2 times daily. (Patient not taking: Reported on 5/5/2025) 120 tablet 5    order for DME Equipment being ordered: Home blood pressure monitor (Patient not taking: Reported on 2/27/2025) 1 Units 0       REVIEW OF SYSTEMS: Review of systems reviewed with the patient and otherwise negative except for those detailed above.    PHYSICAL EXAM: /81 (BP Location: Right arm, Patient Position: Sitting, Cuff Size: Adult Regular)   Pulse 78   Temp 97.9  F (36.6  C) (Oral)   Resp 16   Ht 1.727 m (5' 7.99\")   Wt 74.9 kg (165 lb 3.2 oz)   SpO2 98%   BMI 25.12 kg/m  . ECOG performance status: 0. Physical exam was unchanged from prior clinic visit 02/27/2025.  Skin: No erythema or rash.  HEENT: Sclera nonicteric. Oropharynx without lesions or ulceration, mucosa pink and moist.  Nodes: No cervical, supraclavicular, axillary, or inguinal adenopathy.  Lungs: No dullness to percussion.  No rales, wheezes, rhonchi.  Heart: Regular rate and rhythm.  Abdomen: Bowel sounds present.  Soft, nontender, no hepatosplenomegaly or mass.  Extremities: No edema.     LABORATORY REVIEWED:  Component      Latest Ref Rng 4/25/2024  1:23 PM 2/27/2025  9:27 AM 4/1/2025  8:17 AM   WBC      4.0 - 11.0 10e3/uL   4.4    RBC Count      4.40 - 5.90 10e6/uL   4.18 (L)    Hemoglobin      13.3 - 17.7 g/dL   12.8 (L)    Hematocrit      40.0 - 53.0 %   38.3 (L)    MCV      78 - 100 fL   92    MCH      26.5 - 33.0 pg   30.6    MCHC      31.5 - 36.5 g/dL   " 33.4    RDW      10.0 - 15.0 %   13.4    Platelet Count      150 - 450 10e3/uL   179    % Neutrophils      %   54    % Lymphocytes      %   20    % Monocytes      %   9    % Eosinophils      %   16    % Basophils      %   1    % Immature Granulocytes      %   0    NRBC/W      <1 /100   0    Absolute Neutrophil      1.6 - 8.3 10e3/uL   2.4    Absolute Lymphocytes      0.8 - 5.3 10e3/uL   0.9    Absolute Monocytes      0.0 - 1.3 10e3/uL   0.4    Absolute Eosinophils      0.0 - 0.7 10e3/uL   0.7    Absolute Basophils      0.0 - 0.2 10e3/uL   0.1    Absolute Immature Granulocytes      <=0.4 10e3/uL   0.0    Absolute NRBCs      10e3/uL   0.0    Sodium      135 - 145 mmol/L   139    Potassium      3.4 - 5.3 mmol/L   4.0    Carbon Dioxide (CO2)      22 - 29 mmol/L   25    Anion Gap      7 - 15 mmol/L   8    Urea Nitrogen      8.0 - 23.0 mg/dL   13.1    Creatinine      0.67 - 1.17 mg/dL   1.05    GFR Estimate      >60 mL/min/1.73m2   77    Calcium      8.8 - 10.4 mg/dL   8.9    Chloride      98 - 107 mmol/L   106    Glucose      70 - 99 mg/dL   108 (H)    Alkaline Phosphatase      40 - 150 U/L   65    AST      0 - 45 U/L   27    ALT      0 - 70 U/L   24    Protein Total      6.4 - 8.3 g/dL   6.1 (L)    Albumin      3.5 - 5.2 g/dL   4.0    Bilirubin Total      <=1.2 mg/dL   0.6    PSA Tumor Marker      0.00 - 4.50 ng/mL 0.54  3.18  3.42    Testosterone Total      240 - 950 ng/dL   193 (L)        RADIOLOGY REVIEWED: Bone density scan 03/06/2025, revealed osteopenia.    IMPRESSION/PLAN: Stage IVB adenocarcinoma prostate.  Prostate cancer is a [place] 4+3=7 adenocarcinoma with perineural invasion and extraprostatic extension, without locoregional or distant metastases.  Patient underwent definitive surgery (05/20/2020).  Postoperative PSA was undetectable (<0.1 ng/mL from 08/12/2020 to 05/27/2021) with subsequent biochemical pression with PSA 0.13 ng/mL (11/11/2021), PSA 0.35 ng/mL (02/22/2022).  There was no evidence of  metastases on restaging 18-F-Fluciclovine PET/CT scan (01/10/2022).  Patient received salvage radiation therapy to the prostate fossa and bilateral pelvic lymph nodes (from 04/18/2022 through 06/06/2022) without ADT.  There was a PSA alisa of 0.1 ng/mL (12/30/2022) followed by biochemical progression with PSA of 0.2 ng/mL (03/30/2023), PSA 0.3 ng/mL (10/24/2023).  PSA 0.76 ng mL (03/21/2024), PSA 0.97 ng/mL (05/02/2024), PSA 1.1 ng mL (07/31/2024), PSA 1.92 ng/mL (10/31/2024), PSA 2.2 ng mL (12/03/2024), PSA 3.18 ng/mL (02/27/2025).  There was no evidence of metastases on PSMA PET/CT scan (10/30/2023, 05/24/2024) with subsequent detection of skeletal metastases to the right T6 transverse process and left L2 pedicle detected on repeat PSMA PET/CT scan (12/20/2024).  Patient received SABR to the T6 and L2 metastasis (completed 02/24/2025). Patient is receiving first-line ADT consisting of leuprolide 22.5 mg every 3 months (beginning 04/01/2025) and enzalutamide 160 mg daily (beginning 04/22/2025).  There is grade 1 fatigue, exercise intolerance, vasomotor symptoms, and anemia.  Enzalutamide/leuprolide will be continued without modification.  I reviewed the risks and side effects of enzalutamide with the patient, which include fatigue, weakness, flushing, anorexia, weight loss, headache, dizziness, change in taste sensation, breast tenderness and swelling, constipation, diarrhea, peripheral edema, myalgias, arthralgias, falls, forgetfulness, depression, hypertension, and seizures.  Leuprolide is associated with fatigue, hot flashes, weakness, loss of muscle mass, weight gain, forgetfulness, depression, irritability breast enlargement, loss of libido, impotence, joint stiffness and pain, coronary artery disease, osteoporosis, and bone fracture.  Patient understood the indication and risks and agreed to continue therapy.  Patient will continue calcium plus vitamin D supplementation to treat osteopenia.  Patient will  return to clinic in 3 months with CBC, metabolic panel, PSA. The current and past history, clinical evaluation, reviewing diagnostic tests with the patient, and assessment, complex management of enzalutamide and leuprolide toxicities, and planning occurred over 30 minutes.       Александр Chavez MD    cc: MD Joseph Ellington MD Robert Delaune, MD Ryan K Funk, MD Basir U Tareen, MD

## 2025-04-30 ENCOUNTER — TELEPHONE (OUTPATIENT)
Dept: ONCOLOGY | Facility: CLINIC | Age: 70
End: 2025-04-30
Payer: MEDICARE

## 2025-04-30 NOTE — ORAL ONC MGMT
Oral Chemotherapy Monitoring Program    Subjective/Objective:  Inocente Rios is a 69 year old male contacted by phone for a follow-up visit for oral chemotherapy.  Inocente said he has not noticed any side effects since starting Xtandi. He noted some fatigue but nothing major and he seemed to indicate it was related to lack of sleep due to other things going on in his life unrelated to his therapy. He said his stools are fairly normal and any abnormalities he feels are related to dietary choices. He denied any changes in his muscles or joints. He has not taken his blood pressure.         1/17/2025    11:00 AM 4/10/2025    11:00 AM 4/30/2025     1:00 PM   ORAL CHEMOTHERAPY   Assessment Type Initial Work up New Teach;Refill Initial Follow up   Diagnosis Code Prostate Cancer Prostate Cancer Prostate Cancer   Providers Kathy Chavez   Clinic Name/Location [facility]   Is this patient followed by the [facility] team? No No    Drug Name Xtandi (enzalutamide) Xtandi (enzalutamide) Xtandi (enzalutamide)   Dose 160 mg 160 mg 160 mg   Current Schedule Daily Daily Daily   Cycle Details Continuous Continuous Continuous   Start Date of Last Cycle   4/22/2025   Planned next cycle start date  4/22/2025 5/22/2025   Doses missed in last 2 weeks   0   Adherence Assessment   Adherent   Adverse Effects   No AE identified during assessment   Home BPs   not done   Any new drug interactions?  No    Is the dose as ordered appropriate for the patient?  Yes    Was a medication prescribed as part of a CPA?  No        Last PHQ-2 Score on record:       2/27/2025     9:33 AM 1/3/2020     8:07 AM   PHQ-2 ( 1999 [employer])   Q1: Little interest or pleasure in doing things 1 0   Q2: Feeling down, depressed or hopeless 1 0   PHQ-2 Score 2 0   PHQ-2 Total Score (12-17 Years)- Positive if 3 or more points; Administer PHQ-A if positive  0       Vitals:  BP:   BP Readings from Last 1 Encounters:   04/16/25 114/68     Wt Readings  "from Last 1 Encounters:   04/16/25 72.2 kg (159 lb 3.2 oz)     Estimated body surface area is 1.86 meters squared as calculated from the following:    Height as of 4/16/25: 1.727 m (5' 8\").    Weight as of 4/16/25: 72.2 kg (159 lb 3.2 oz).    Labs:  _  Result Component Current Result Ref Range   Sodium 139 (4/1/2025) 135 - 145 mmol/L     _  Result Component Current Result Ref Range   Potassium 4.0 (4/1/2025) 3.4 - 5.3 mmol/L     _  Result Component Current Result Ref Range   Calcium 8.9 (4/1/2025) 8.8 - 10.4 mg/dL     No results found for Mag within last 30 days.     No results found for Phos within last 30 days.     _  Result Component Current Result Ref Range   Albumin 4.0 (4/1/2025) 3.5 - 5.2 g/dL     _  Result Component Current Result Ref Range   Urea Nitrogen 13.1 (4/1/2025) 8.0 - 23.0 mg/dL     _  Result Component Current Result Ref Range   Creatinine 1.05 (4/1/2025) 0.67 - 1.17 mg/dL     _  Result Component Current Result Ref Range   AST 27 (4/1/2025) 0 - 45 U/L     _  Result Component Current Result Ref Range   ALT 24 (4/1/2025) 0 - 70 U/L     _  Result Component Current Result Ref Range   Bilirubin Total 0.6 (4/1/2025) <=1.2 mg/dL     _  Result Component Current Result Ref Range   WBC Count 4.4 (4/1/2025) 4.0 - 11.0 10e3/uL     _  Result Component Current Result Ref Range   Hemoglobin 12.8 (L) (4/1/2025) 13.3 - 17.7 g/dL     _  Result Component Current Result Ref Range   Platelet Count 179 (4/1/2025) 150 - 450 10e3/uL     _  Result Component Current Result Ref Range   Absolute Neutrophils 2.4 (4/1/2025) 1.6 - 8.3 10e3/uL     No results found for ANC within last 30 days.          Assessment/Plan:  Inocente appears to be doing well in his first week on Xtandi. Continue. He was advised to check his blood pressure weekly at home and make a record of his pressures, and to notify us if he has any abnormalities. He said he would look into getting a blood pressure cuff to do these checks at home. In the meantime he " was encouraged to get it checked at the upcoming office visit on 5/5/2025. Inocente expressed understanding and agreement with this plan and thanked me for the call and care.    Follow-Up:      Refill Due:  By 5/22/2025    Slade Santiago PharmD  [facility]  [phone]  April 30, 2025

## 2025-05-05 ENCOUNTER — ANCILLARY PROCEDURE (OUTPATIENT)
Dept: CARDIOLOGY | Facility: CLINIC | Age: 70
End: 2025-05-05
Attending: INTERNAL MEDICINE
Payer: MEDICARE

## 2025-05-05 ENCOUNTER — ONCOLOGY VISIT (OUTPATIENT)
Dept: ONCOLOGY | Facility: CLINIC | Age: 70
End: 2025-05-05
Attending: INTERNAL MEDICINE
Payer: MEDICARE

## 2025-05-05 VITALS
HEIGHT: 68 IN | WEIGHT: 165.2 LBS | TEMPERATURE: 97.9 F | BODY MASS INDEX: 25.04 KG/M2 | DIASTOLIC BLOOD PRESSURE: 81 MMHG | RESPIRATION RATE: 16 BRPM | SYSTOLIC BLOOD PRESSURE: 126 MMHG | HEART RATE: 78 BPM | OXYGEN SATURATION: 98 %

## 2025-05-05 DIAGNOSIS — I49.01 VF (VENTRICULAR FIBRILLATION) (H): ICD-10-CM

## 2025-05-05 DIAGNOSIS — Z95.810 ICD (IMPLANTABLE CARDIOVERTER-DEFIBRILLATOR) IN PLACE: ICD-10-CM

## 2025-05-05 DIAGNOSIS — C79.51 METASTASIS TO BONE (H): ICD-10-CM

## 2025-05-05 DIAGNOSIS — C61 PROSTATE CANCER (H): Primary | ICD-10-CM

## 2025-05-05 LAB
ALBUMIN SERPL BCG-MCNC: 4 G/DL (ref 3.5–5.2)
ALP SERPL-CCNC: 58 U/L (ref 40–150)
ALT SERPL W P-5'-P-CCNC: 18 U/L (ref 0–70)
ANION GAP SERPL CALCULATED.3IONS-SCNC: 7 MMOL/L (ref 7–15)
AST SERPL W P-5'-P-CCNC: 26 U/L (ref 0–45)
BASOPHILS # BLD AUTO: 0 10E3/UL (ref 0–0.2)
BASOPHILS NFR BLD AUTO: 1 %
BILIRUB SERPL-MCNC: 0.4 MG/DL
BUN SERPL-MCNC: 14.5 MG/DL (ref 8–23)
CALCIUM SERPL-MCNC: 9.1 MG/DL (ref 8.8–10.4)
CHLORIDE SERPL-SCNC: 108 MMOL/L (ref 98–107)
CREAT SERPL-MCNC: 1.04 MG/DL (ref 0.67–1.17)
EGFRCR SERPLBLD CKD-EPI 2021: 78 ML/MIN/1.73M2
EOSINOPHIL # BLD AUTO: 0.5 10E3/UL (ref 0–0.7)
EOSINOPHIL NFR BLD AUTO: 13 %
ERYTHROCYTE [DISTWIDTH] IN BLOOD BY AUTOMATED COUNT: 13.7 % (ref 10–15)
GLUCOSE SERPL-MCNC: 76 MG/DL (ref 70–99)
HCO3 SERPL-SCNC: 26 MMOL/L (ref 22–29)
HCT VFR BLD AUTO: 36.7 % (ref 40–53)
HGB BLD-MCNC: 12.6 G/DL (ref 13.3–17.7)
IMM GRANULOCYTES # BLD: 0 10E3/UL
IMM GRANULOCYTES NFR BLD: 0 %
LYMPHOCYTES # BLD AUTO: 0.8 10E3/UL (ref 0.8–5.3)
LYMPHOCYTES NFR BLD AUTO: 21 %
MCH RBC QN AUTO: 31.1 PG (ref 26.5–33)
MCHC RBC AUTO-ENTMCNC: 34.3 G/DL (ref 31.5–36.5)
MCV RBC AUTO: 91 FL (ref 78–100)
MDC_IDC_EPISODE_DTM: NORMAL
MDC_IDC_EPISODE_ID: NORMAL
MDC_IDC_EPISODE_TYPE: NORMAL
MDC_IDC_LEAD_CONNECTION_STATUS: NORMAL
MDC_IDC_LEAD_IMPLANT_DT: NORMAL
MDC_IDC_LEAD_LOCATION: NORMAL
MDC_IDC_LEAD_LOCATION_DETAIL_1: NORMAL
MDC_IDC_LEAD_MFG: NORMAL
MDC_IDC_LEAD_MODEL: NORMAL
MDC_IDC_LEAD_POLARITY_TYPE: NORMAL
MDC_IDC_LEAD_SERIAL: NORMAL
MDC_IDC_MSMT_BATTERY_DTM: NORMAL
MDC_IDC_MSMT_BATTERY_REMAINING_LONGEVITY: 156 MO
MDC_IDC_MSMT_BATTERY_REMAINING_PERCENTAGE: 100 %
MDC_IDC_MSMT_BATTERY_STATUS: NORMAL
MDC_IDC_MSMT_CAP_CHARGE_DTM: NORMAL
MDC_IDC_MSMT_CAP_CHARGE_TIME: 9.8 S
MDC_IDC_MSMT_CAP_CHARGE_TYPE: NORMAL
MDC_IDC_MSMT_LEADCHNL_RV_IMPEDANCE_VALUE: 750 OHM
MDC_IDC_PG_IMPLANT_DTM: NORMAL
MDC_IDC_PG_MFG: NORMAL
MDC_IDC_PG_MODEL: NORMAL
MDC_IDC_PG_SERIAL: NORMAL
MDC_IDC_PG_TYPE: NORMAL
MDC_IDC_SESS_CLINIC_NAME: NORMAL
MDC_IDC_SESS_DTM: NORMAL
MDC_IDC_SESS_TYPE: NORMAL
MDC_IDC_SET_BRADY_LOWRATE: 40 {BEATS}/MIN
MDC_IDC_SET_BRADY_MODE: NORMAL
MDC_IDC_SET_LEADCHNL_RV_PACING_AMPLITUDE: 2.8 V
MDC_IDC_SET_LEADCHNL_RV_PACING_CAPTURE_MODE: NORMAL
MDC_IDC_SET_LEADCHNL_RV_PACING_POLARITY: NORMAL
MDC_IDC_SET_LEADCHNL_RV_PACING_PULSEWIDTH: 1 MS
MDC_IDC_SET_LEADCHNL_RV_SENSING_ADAPTATION_MODE: NORMAL
MDC_IDC_SET_LEADCHNL_RV_SENSING_POLARITY: NORMAL
MDC_IDC_SET_LEADCHNL_RV_SENSING_SENSITIVITY: 0.6 MV
MDC_IDC_SET_ZONE_DETECTION_INTERVAL: 250 MS
MDC_IDC_SET_ZONE_DETECTION_INTERVAL: 324 MS
MDC_IDC_SET_ZONE_STATUS: NORMAL
MDC_IDC_SET_ZONE_STATUS: NORMAL
MDC_IDC_SET_ZONE_TYPE: NORMAL
MDC_IDC_SET_ZONE_TYPE: NORMAL
MDC_IDC_SET_ZONE_VENDOR_TYPE: NORMAL
MDC_IDC_SET_ZONE_VENDOR_TYPE: NORMAL
MDC_IDC_STAT_BRADY_DTM_END: NORMAL
MDC_IDC_STAT_BRADY_DTM_START: NORMAL
MDC_IDC_STAT_BRADY_RV_PERCENT_PACED: 0 %
MDC_IDC_STAT_EPISODE_RECENT_COUNT: 0
MDC_IDC_STAT_EPISODE_RECENT_COUNT_DTM_END: NORMAL
MDC_IDC_STAT_EPISODE_RECENT_COUNT_DTM_START: NORMAL
MDC_IDC_STAT_EPISODE_TYPE: NORMAL
MDC_IDC_STAT_EPISODE_VENDOR_TYPE: NORMAL
MDC_IDC_STAT_TACHYTHERAPY_ATP_DELIVERED_RECENT: 0
MDC_IDC_STAT_TACHYTHERAPY_ATP_DELIVERED_TOTAL: 0
MDC_IDC_STAT_TACHYTHERAPY_RECENT_DTM_END: NORMAL
MDC_IDC_STAT_TACHYTHERAPY_RECENT_DTM_START: NORMAL
MDC_IDC_STAT_TACHYTHERAPY_SHOCKS_ABORTED_RECENT: 0
MDC_IDC_STAT_TACHYTHERAPY_SHOCKS_ABORTED_TOTAL: 0
MDC_IDC_STAT_TACHYTHERAPY_SHOCKS_DELIVERED_RECENT: 0
MDC_IDC_STAT_TACHYTHERAPY_SHOCKS_DELIVERED_TOTAL: 0
MDC_IDC_STAT_TACHYTHERAPY_TOTAL_DTM_END: NORMAL
MDC_IDC_STAT_TACHYTHERAPY_TOTAL_DTM_START: NORMAL
MONOCYTES # BLD AUTO: 0.4 10E3/UL (ref 0–1.3)
MONOCYTES NFR BLD AUTO: 9 %
NEUTROPHILS # BLD AUTO: 2.3 10E3/UL (ref 1.6–8.3)
NEUTROPHILS NFR BLD AUTO: 56 %
NRBC # BLD AUTO: 0 10E3/UL
NRBC BLD AUTO-RTO: 0 /100
PLATELET # BLD AUTO: 182 10E3/UL (ref 150–450)
POTASSIUM SERPL-SCNC: 4 MMOL/L (ref 3.4–5.3)
PROT SERPL-MCNC: 6.1 G/DL (ref 6.4–8.3)
PSA SERPL DL<=0.01 NG/ML-MCNC: 0.05 NG/ML (ref 0–4.5)
RBC # BLD AUTO: 4.05 10E6/UL (ref 4.4–5.9)
SODIUM SERPL-SCNC: 141 MMOL/L (ref 135–145)
WBC # BLD AUTO: 4.1 10E3/UL (ref 4–11)

## 2025-05-05 PROCEDURE — G0463 HOSPITAL OUTPT CLINIC VISIT: HCPCS | Performed by: INTERNAL MEDICINE

## 2025-05-05 PROCEDURE — 93296 REM INTERROG EVL PM/IDS: CPT | Performed by: INTERNAL MEDICINE

## 2025-05-05 PROCEDURE — 80053 COMPREHEN METABOLIC PANEL: CPT | Performed by: INTERNAL MEDICINE

## 2025-05-05 PROCEDURE — 84153 ASSAY OF PSA TOTAL: CPT | Performed by: INTERNAL MEDICINE

## 2025-05-05 PROCEDURE — 99215 OFFICE O/P EST HI 40 MIN: CPT | Performed by: INTERNAL MEDICINE

## 2025-05-05 PROCEDURE — 85004 AUTOMATED DIFF WBC COUNT: CPT | Performed by: INTERNAL MEDICINE

## 2025-05-05 PROCEDURE — 36415 COLL VENOUS BLD VENIPUNCTURE: CPT | Performed by: INTERNAL MEDICINE

## 2025-05-05 PROCEDURE — 93295 DEV INTERROG REMOTE 1/2/MLT: CPT | Performed by: INTERNAL MEDICINE

## 2025-05-05 ASSESSMENT — PAIN SCALES - GENERAL: PAINLEVEL_OUTOF10: NO PAIN (0)

## 2025-05-05 NOTE — NURSING NOTE
"Oncology Rooming Note    May 5, 2025 11:15 AM   Inocente Rios is a 69 year old male who presents for:    Chief Complaint   Patient presents with    Oncology Clinic Visit     RTN Prostate CA     Blood Draw     Labs drawn via  by RN in lab, vitals taken.      Initial Vitals: /81 (BP Location: Right arm, Patient Position: Sitting, Cuff Size: Adult Regular)   Pulse 78   Temp 97.9  F (36.6  C) (Oral)   Resp 18   Wt 74.9 kg (165 lb 3.2 oz)   SpO2 98%   BMI 25.12 kg/m   Estimated body mass index is 25.12 kg/m  as calculated from the following:    Height as of 4/16/25: 1.727 m (5' 8\").    Weight as of this encounter: 74.9 kg (165 lb 3.2 oz). Body surface area is 1.9 meters squared.  No Pain (0) Comment: Data Unavailable   No LMP for male patient.  Allergies reviewed: Yes  Medications reviewed: Yes    Medications: Medication refills not needed today.  Pharmacy name entered into [facility]:    [facility] - [place], [place] - [address]  [id number] - [place], [place] - [address]  [facility] - [place], [place] - [address]    Frailty Screening:   Is the patient here for a new oncology consult visit in cancer care? 2. No    PHQ9:  Did this patient require a PHQ9?: No      Clinical concerns: None      Gilda Hernandez"

## 2025-05-05 NOTE — LETTER
5/5/2025      Inocente Rios  [address]  [facility] [address]      Dear Colleague,    Thank you for referring your patient, Inocente Rios, to the [facility]. Please see a copy of my visit note below.      PRIMARY CARE PHYSICIAN: Slade Parisi MD  CARDIOLOGY: Joseph Cronin MD  MEDICAL ONCOLOGY: Jorge Salmon MD  RADIATION ONCOLOGY: Derrick Brennan MD  UROLOGY: Bing Angel MD    HISTORY OF PRESENT ILLNESS: Patient is a 69-year-old male with stage IVB adenocarcinoma prostate (pT3a, pN0, cM1b, PSA 8.6 ng/mL, grade group 3).  Patient was diagnosed with stage III adenocarcinoma prostate (pT3a, pN0, cM0, PSA 8.6 ng/mL, grade group 3), and presented with an elevated PSA of 8.6 ng/mL (01/03/2020). Transrectal ultrasound-guided prostate core needle biopsy 02/27/2020, revealed a [place] 4+3=7 adenocarcinoma involving 75% of the core needle biopsy sample from the right mid medial, 75% of the biopsy sample from the right mid lateral, 90% of the biopsy sample from the right medial base, 70% of the biopsy sample with perineural invasion from the right lateral base.  There is a [place] 3+4=7 adenocarcinoma involving 30% of the core needle biopsy sample from the left medial apex, 50% of the biopsy sample from the left lateral apex, 25% of the biopsy sample from the left mid medial, 55% of the biopsy sample from the left medial base.  There was a [place] 3+3=6 adenocarcinoma involving 20% of the core needle biopsy sample from the right medial apex, 5-10% of the biopsy sample from the right lateral apex, 30% of the biopsy sample from the left mid lateral.  There was high-grade prostatic intraepithelial neoplasia with rare adjacent small atypical glands in the core needle biopsy sample from the left lateral base (11/12 biopsy samples involved). CT abdomen, pelvis 05/04/2020, was negative for adenopathy or metastases.  There was heterogeneous enhancement of the prostate with 9 mm  enhancing nodule along the right posterior aspect of the prostate.  Bone scan 05/04/2020 was negative for metastases.  There was mild uptake in the bilateral acromioclavicular joints and right knee. Patient underwent robotic-assisted laparoscopic radical prostatectomy and bilateral pelvic lymph node dissection 05/20/2020, there revealed a [place] 4+3=7 acinar and ductal adenocarcinoma involving 35% of the prostate with the largest focus measuring 1.5 cm.  There was nonfocal extraprostatic extension involving the right posterior prostate.  There was perineural, but no lymphovascular invasion.  There was no bladder neck invasion or seminal vesicle involvement.  Surgical margins were tumor free. Two pelvic lymph nodes were negative for metastases (0/2 lymph nodes involved). NGS testing ([facility]) on the prostatectomy sample (05/20/2020), revealed a CDKN1B exon 1, p.Q65*, c.193C>T, and CREBBP exon 3, p.T291fs, c.870delC pathogenic variants.  There was a CDKN1B exon 1, p.D63V, c.188A>T variant of uncertain significance.  Microsatellite instability status was MS-stable.  Tumor mutational burden was 5 Muts/Mb. gLOH was low at 6%.     PSA was undetectable at <0.1 ng/mL (from 08/12/2020 to 05/27/2021) with subsequent biochemical pression with PSA 0.13 ng/mL (11/11/2021), PSA 0.15 ng/mL (12/03/2021), PSA 0.35 ng/mL (02/22/2022).  Decipher Genomic Risk 12/22/2021) revealed a decipher score of 0.32 (low risk).  18-F-Fluciclovine PET/CT scan 01/10/2022, revealed postsurgical changes related to prior prostatectomy and pelvic lymph node dissection.  There were no suspicious PSMA-avid lesions.  Patient received salvage radiation therapy to the prostate fossa (6800 cGy in 34 fractions) and bilateral pelvic lymph nodes (4500 cGy in 25 fractions) from 04/18/2022 through 06/06/2022, without androgen deprivation therapy (ADT).  There was a PSA alisa of 0.1 ng/mL (12/30/2022) followed by a rising PSA of 0.2 ng/mL (03/30/2023), PSA 0.3 ng/mL  (10/24/2023).  PSMA PET/CT scan 10/30/2023, was negative for PSMA-avid lesions.    There was continued biochemical progression with PSA 0.6 ng/mL (01/21/2024), PSA 0.76 ng mL (03/21/2024), PSA 0.97 ng/mL (05/02/2024).  PSMA PET/CT scan 05/24/2024, showed postsurgical changes related to prior prostatectomy.  There were no PSMA-avid metastases.  Patient continued observation with PSA 1.1 ng mL (07/31/2024), PSA 1.92 ng/mL (10/31/2024), PSA 2.2 ng mL (12/03/2024).  PSMA PET/CT scan 12/20/2024, revealed PSMA-avid metastases in the right T6 transverse process and left L2 pedicle. Patient received stereotactic ablative radiation therapy (SABR) to the T6 right transverse process (30 Gy in 3 fractions), T6 vertebra (21 Gy in 30 fractions), L2 left pedicle (30 Gy in 3 fractions), and L2 vertebra (21 Gy in 3 fractions) from 02/18/2025 through 02/20/2025.  Patient is receiving first-line ADT consisting of leuprolide 22.5 mg every 3 months beginning 04/01/2025, and enzalutamide 160 mg daily beginning 04/22/2025.  Patient has fatigue, mild decrease in exercise tolerance, and mild hot flashes.  Patient has impotence since prostate cancer surgery and radiation therapy. There are no other new symptoms or events since the prior clinic visit (02/27/2025).  Patient denies fever, anorexia, weight loss, headache, cough, dyspnea, chest pain, abdominal pain, nausea, vomiting, constipation, diarrhea.     PAST HISTORY: Past history was reviewed and unchanged from the clinic note 01/15/2025.     MEDICATIONS:   Current Outpatient Medications   Medication Sig Dispense Refill     albuterol (PROAIR HFA/PROVENTIL HFA/VENTOLIN HFA) 108 (90 Base) MCG/ACT inhaler Inhale 2 puffs into the lungs every 4 hours as needed for shortness of breath / dyspnea or wheezing 18 g 1     ASPIRIN PO Take 81 mg by mouth       enzalutamide (XTANDI) 80 MG tablet Take 2 tablets (160 mg) by mouth daily. 60 tablet 0     ketoconazole (NIZORAL) 2 % external cream APPLY  "DAILY TO SKIN TO AFFECTED AREA TWICE A DAY FOR 2 WEEKS       leuprolide (LUPRON DEPOT, 3-MONTH,) 22.5 MG kit as directed Intramuscular       metoprolol succinate ER (TOPROL XL) 25 MG 24 hr tablet Take 1 tablet (25 mg) by mouth daily. 90 tablet 3     PERMETHRIN EX 20-30 mmHg thigh high compression stockings.  Wear as directed.       prochlorperazine (COMPAZINE) 5 MG tablet Take 1 tablet (5 mg) by mouth every 6 hours as needed for nausea or vomiting. 30 tablet 2     tadalafil (CIALIS) 20 MG tablet Take 10 mg by mouth twice a week       atorvastatin (LIPITOR) 40 MG tablet TAKE 1 TABLET (40 MG TOTAL) BY MOUTH DAILY. (Patient not taking: Reported on 5/5/2025) 90 tablet 0     calcium carbonate-vitamin D (OSCAL) 500-5 MG-MCG tablet Take 1 tablet by mouth 2 times daily. (Patient not taking: Reported on 5/5/2025) 120 tablet 5     order for DME Equipment being ordered: Home blood pressure monitor (Patient not taking: Reported on 2/27/2025) 1 Units 0       REVIEW OF SYSTEMS: Review of systems reviewed with the patient and otherwise negative except for those detailed above.    PHYSICAL EXAM: /81 (BP Location: Right arm, Patient Position: Sitting, Cuff Size: Adult Regular)   Pulse 78   Temp 97.9  F (36.6  C) (Oral)   Resp 16   Ht 1.727 m (5' 7.99\")   Wt 74.9 kg (165 lb 3.2 oz)   SpO2 98%   BMI 25.12 kg/m  . ECOG performance status: 0. Physical exam was unchanged from prior clinic visit 02/27/2025.  Skin: No erythema or rash.  HEENT: Sclera nonicteric. Oropharynx without lesions or ulceration, mucosa pink and moist.  Nodes: No cervical, supraclavicular, axillary, or inguinal adenopathy.  Lungs: No dullness to percussion.  No rales, wheezes, rhonchi.  Heart: Regular rate and rhythm.  Abdomen: Bowel sounds present.  Soft, nontender, no hepatosplenomegaly or mass.  Extremities: No edema.     LABORATORY REVIEWED:  Component      Latest Ref Rng 4/25/2024  1:23 PM 2/27/2025  9:27 AM 4/1/2025  8:17 AM   WBC      4.0 - 11.0 " 10e3/uL   4.4    RBC Count      4.40 - 5.90 10e6/uL   4.18 (L)    Hemoglobin      13.3 - 17.7 g/dL   12.8 (L)    Hematocrit      40.0 - 53.0 %   38.3 (L)    MCV      78 - 100 fL   92    MCH      26.5 - 33.0 pg   30.6    MCHC      31.5 - 36.5 g/dL   33.4    RDW      10.0 - 15.0 %   13.4    Platelet Count      150 - 450 10e3/uL   179    % Neutrophils      %   54    % Lymphocytes      %   20    % Monocytes      %   9    % Eosinophils      %   16    % Basophils      %   1    % Immature Granulocytes      %   0    NRBC/W      <1 /100   0    Absolute Neutrophil      1.6 - 8.3 10e3/uL   2.4    Absolute Lymphocytes      0.8 - 5.3 10e3/uL   0.9    Absolute Monocytes      0.0 - 1.3 10e3/uL   0.4    Absolute Eosinophils      0.0 - 0.7 10e3/uL   0.7    Absolute Basophils      0.0 - 0.2 10e3/uL   0.1    Absolute Immature Granulocytes      <=0.4 10e3/uL   0.0    Absolute NRBCs      10e3/uL   0.0    Sodium      135 - 145 mmol/L   139    Potassium      3.4 - 5.3 mmol/L   4.0    Carbon Dioxide (CO2)      22 - 29 mmol/L   25    Anion Gap      7 - 15 mmol/L   8    Urea Nitrogen      8.0 - 23.0 mg/dL   13.1    Creatinine      0.67 - 1.17 mg/dL   1.05    GFR Estimate      >60 mL/min/1.73m2   77    Calcium      8.8 - 10.4 mg/dL   8.9    Chloride      98 - 107 mmol/L   106    Glucose      70 - 99 mg/dL   108 (H)    Alkaline Phosphatase      40 - 150 U/L   65    AST      0 - 45 U/L   27    ALT      0 - 70 U/L   24    Protein Total      6.4 - 8.3 g/dL   6.1 (L)    Albumin      3.5 - 5.2 g/dL   4.0    Bilirubin Total      <=1.2 mg/dL   0.6    PSA Tumor Marker      0.00 - 4.50 ng/mL 0.54  3.18  3.42    Testosterone Total      240 - 950 ng/dL   193 (L)        RADIOLOGY REVIEWED: Bone density scan 03/06/2025, revealed osteopenia.    IMPRESSION/PLAN: Stage IVB adenocarcinoma prostate.  Prostate cancer is a [place] 4+3=7 adenocarcinoma with perineural invasion and extraprostatic extension, without locoregional or distant metastases.  Patient  underwent definitive surgery (05/20/2020).  Postoperative PSA was undetectable (<0.1 ng/mL from 08/12/2020 to 05/27/2021) with subsequent biochemical pression with PSA 0.13 ng/mL (11/11/2021), PSA 0.35 ng/mL (02/22/2022).  There was no evidence of metastases on restaging 18-F-Fluciclovine PET/CT scan (01/10/2022).  Patient received salvage radiation therapy to the prostate fossa and bilateral pelvic lymph nodes (from 04/18/2022 through 06/06/2022) without ADT.  There was a PSA alisa of 0.1 ng/mL (12/30/2022) followed by biochemical progression with PSA of 0.2 ng/mL (03/30/2023), PSA 0.3 ng/mL (10/24/2023).  PSA 0.76 ng mL (03/21/2024), PSA 0.97 ng/mL (05/02/2024), PSA 1.1 ng mL (07/31/2024), PSA 1.92 ng/mL (10/31/2024), PSA 2.2 ng mL (12/03/2024), PSA 3.18 ng/mL (02/27/2025).  There was no evidence of metastases on PSMA PET/CT scan (10/30/2023, 05/24/2024) with subsequent detection of skeletal metastases to the right T6 transverse process and left L2 pedicle detected on repeat PSMA PET/CT scan (12/20/2024).  Patient received SABR to the T6 and L2 metastasis (completed 02/24/2025). Patient is receiving first-line ADT consisting of leuprolide 22.5 mg every 3 months (beginning 04/01/2025) and enzalutamide 160 mg daily (beginning 04/22/2025).  There is grade 1 fatigue, exercise intolerance, vasomotor symptoms, and anemia.  Enzalutamide/leuprolide will be continued without modification.  I reviewed the risks and side effects of enzalutamide with the patient, which include fatigue, weakness, flushing, anorexia, weight loss, headache, dizziness, change in taste sensation, breast tenderness and swelling, constipation, diarrhea, peripheral edema, myalgias, arthralgias, falls, forgetfulness, depression, hypertension, and seizures.  Leuprolide is associated with fatigue, hot flashes, weakness, loss of muscle mass, weight gain, forgetfulness, depression, irritability breast enlargement, loss of libido, impotence, joint  stiffness and pain, coronary artery disease, osteoporosis, and bone fracture.  Patient understood the indication and risks and agreed to continue therapy.  Patient will continue calcium plus vitamin D supplementation to treat osteopenia.  Patient will return to clinic in 3 months with CBC, metabolic panel, PSA. The current and past history, clinical evaluation, reviewing diagnostic tests with the patient, and assessment, complex management of enzalutamide and leuprolide toxicities, and planning occurred over 30 minutes.       Александр Chavez MD    cc: MD Joseph Ellington MD Robert Delaune, MD Ryan K Funk, MD Basir U Tareen, MD      Again, thank you for allowing me to participate in the care of your patient.        Sincerely,        Александр Chavez MD    Electronically signed

## 2025-05-05 NOTE — NURSING NOTE
Chief Complaint   Patient presents with    Oncology Clinic Visit     RTN Prostate CA     Blood Draw     Labs drawn via  by RN in lab, vitals taken.         Labs collected from venipuncture by RN. Vitals taken. Checked in for appointment(s).    Radha Carmona RN

## 2025-05-10 ENCOUNTER — HEALTH MAINTENANCE LETTER (OUTPATIENT)
Age: 70
End: 2025-05-10

## 2025-05-15 DIAGNOSIS — C61 PROSTATE CANCER (H): ICD-10-CM

## 2025-05-15 DIAGNOSIS — C79.51 METASTASIS TO BONE (H): Primary | ICD-10-CM

## 2025-05-27 NOTE — PROGRESS NOTES
PRIMARY CARE PHYSICIAN: Slade Parisi MD  CARDIOLOGY: Joseph Cronin MD  RADIATION ONCOLOGY: Derrick Brennan MD  UROLOGY: Bing Angel MD    HISTORY OF PRESENT ILLNESS: Patient is a 70-year-old male with stage IVB adenocarcinoma prostate (pT3a, pN0, cM1b, PSA 8.6 ng/mL, grade group 3).  Patient was diagnosed with stage III adenocarcinoma prostate (pT3a, pN0, cM0, PSA 8.6 ng/mL, grade group 3), and presented with an elevated PSA of 8.6 ng/mL (01/03/2020). Transrectal ultrasound-guided prostate core needle biopsy 02/27/2020, revealed a [place] 4+3=7 adenocarcinoma involving 75% of the core needle biopsy sample from the right mid medial, 75% of the biopsy sample from the right mid lateral, 90% of the biopsy sample from the right medial base, 70% of the biopsy sample with perineural invasion from the right lateral base.  There is a [place] 3+4=7 adenocarcinoma involving 30% of the core needle biopsy sample from the left medial apex, 50% of the biopsy sample from the left lateral apex, 25% of the biopsy sample from the left mid medial, 55% of the biopsy sample from the left medial base.  There was a [place] 3+3=6 adenocarcinoma involving 20% of the core needle biopsy sample from the right medial apex, 5-10% of the biopsy sample from the right lateral apex, 30% of the biopsy sample from the left mid lateral.  There was high-grade prostatic intraepithelial neoplasia with rare adjacent small atypical glands in the core needle biopsy sample from the left lateral base (11/12 biopsy samples involved). CT abdomen, pelvis 05/04/2020, was negative for adenopathy or metastases.  There was heterogeneous enhancement of the prostate with 9 mm enhancing nodule along the right posterior aspect of the prostate.  Bone scan 05/04/2020 was negative for metastases.  There was mild uptake in the bilateral acromioclavicular joints and right knee. Patient underwent robotic-assisted laparoscopic radical prostatectomy and bilateral pelvic  lymph node dissection 05/20/2020, there revealed a [place] 4+3=7 acinar and ductal adenocarcinoma involving 35% of the prostate with the largest focus measuring 1.5 cm.  There was nonfocal extraprostatic extension involving the right posterior prostate.  There was perineural, but no lymphovascular invasion.  There was no bladder neck invasion or seminal vesicle involvement.  Surgical margins were tumor free. Two pelvic lymph nodes were negative for metastases (0/2 lymph nodes involved). NGS testing ([employer]) on the prostatectomy sample (05/20/2020), revealed a CDKN1B exon 1, p.Q65*, c.193C>T, and CREBBP exon 3, p.T291fs, c.870delC pathogenic variants.  There was a CDKN1B exon 1, p.D63V, c.188A>T variant of uncertain significance.  Microsatellite instability status was MS-stable.  Tumor mutational burden was 5 Muts/Mb. gLOH was low at 6%.     PSA was undetectable at <0.1 ng/mL (from 08/12/2020 to 05/27/2021) with subsequent biochemical pression with PSA 0.13 ng/mL (11/11/2021), PSA 0.15 ng/mL (12/03/2021), PSA 0.35 ng/mL (02/22/2022).  Decipher Genomic Risk 12/22/2021) revealed a decipher score of 0.32 (low risk).  18-F-Fluciclovine PET/CT scan 01/10/2022, revealed postsurgical changes related to prior prostatectomy and pelvic lymph node dissection.  There were no suspicious PSMA-avid lesions.  Patient received salvage radiation therapy to the prostate fossa (6800 cGy in 34 fractions) and bilateral pelvic lymph nodes (4500 cGy in 25 fractions) from 04/18/2022 through 06/06/2022, without androgen deprivation therapy (ADT).  There was a PSA alisa of 0.1 ng/mL (12/30/2022) followed by a rising PSA of 0.2 ng/mL (03/30/2023), PSA 0.3 ng/mL (10/24/2023).  PSMA PET/CT scan 10/30/2023, was negative for PSMA-avid lesions.    There was continued biochemical progression with PSA 0.6 ng/mL (01/21/2024), PSA 0.76 ng mL (03/21/2024), PSA 0.97 ng/mL (05/02/2024).  PSMA PET/CT scan 05/24/2024, showed postsurgical changes related to  prior prostatectomy.  There were no PSMA-avid metastases.  Patient continued observation with PSA 1.1 ng mL (07/31/2024), PSA 1.92 ng/mL (10/31/2024), PSA 2.2 ng mL (12/03/2024).  PSMA PET/CT scan 12/20/2024, revealed PSMA-avid metastases in the right T6 transverse process and left L2 pedicle. Patient received stereotactic ablative radiation therapy (SABR) to the T6 right transverse process (30 Gy in 3 fractions), T6 vertebra (21 Gy in 30 fractions), L2 left pedicle (30 Gy in 3 fractions), and L2 vertebra (21 Gy in 3 fractions) from 02/18/2025 through 02/20/2025.  Patient is receiving first-line ADT consisting of leuprolide 22.5 mg every 3 months beginning 04/01/2025, and enzalutamide 160 mg daily beginning 04/22/2025.  Patient has fatigue, increase in hot flashes, and depressed mood related to ongoing divorce and living situation.  Patient has impotence since prostate cancer surgery and radiation therapy. There are no other new symptoms or events since the prior clinic visit (05/05/2025).  Patient denies fever, anorexia, weight loss, headache, cough, dyspnea, chest pain, abdominal pain, nausea, vomiting, constipation, diarrhea.     PAST HISTORY: Past history was reviewed and unchanged from the clinic note 01/15/2025.     MEDICATIONS:   Current Outpatient Medications   Medication Sig Dispense Refill    albuterol (PROAIR HFA/PROVENTIL HFA/VENTOLIN HFA) 108 (90 Base) MCG/ACT inhaler Inhale 2 puffs into the lungs every 4 hours as needed for shortness of breath / dyspnea or wheezing 18 g 1    ASPIRIN PO Take 81 mg by mouth      atorvastatin (LIPITOR) 40 MG tablet TAKE 1 TABLET (40 MG TOTAL) BY MOUTH DAILY. (Patient not taking: Reported on 5/5/2025) 90 tablet 0    calcium carbonate-vitamin D (OSCAL) 500-5 MG-MCG tablet Take 1 tablet by mouth 2 times daily. 120 tablet 5    enzalutamide (XTANDI) 80 MG tablet Take 2 tablets (160 mg) by mouth daily. 60 tablet 0    enzalutamide (XTANDI) 80 MG tablet Take 2 tablets (160 mg) by  "mouth daily. 60 tablet 0    ketoconazole (NIZORAL) 2 % external cream APPLY DAILY TO SKIN TO AFFECTED AREA TWICE A DAY FOR 2 WEEKS      leuprolide (LUPRON DEPOT, 3-MONTH,) 22.5 MG kit as directed Intramuscular      metoprolol succinate ER (TOPROL XL) 25 MG 24 hr tablet Take 1 tablet (25 mg) by mouth daily. 90 tablet 3    order for DME Equipment being ordered: Home blood pressure monitor (Patient not taking: Reported on 2/27/2025) 1 Units 0    PERMETHRIN EX 20-30 mmHg thigh high compression stockings.  Wear as directed.      prochlorperazine (COMPAZINE) 5 MG tablet Take 1 tablet (5 mg) by mouth every 6 hours as needed for nausea or vomiting. 30 tablet 2    tadalafil (CIALIS) 20 MG tablet Take 10 mg by mouth twice a week         REVIEW OF SYSTEMS: Review of systems reviewed with the patient and otherwise negative except for those detailed above.    PHYSICAL EXAM: /79 (BP Location: Right arm, Patient Position: Sitting, Cuff Size: Adult Regular)   Pulse 77   Temp 97.8  F (36.6  C) (Oral)   Resp 16   Ht 1.727 m (5' 7.99\")   Wt 75.4 kg (166 lb 3.2 oz)   SpO2 98%   BMI 25.28 kg/m  . ECOG performance status: 0. Physical exam was unchanged from prior clinic visit 05/05/2025.  Skin: No erythema or rash.  HEENT: Sclera nonicteric. Oropharynx without lesions or ulceration, mucosa pink and moist.  Nodes: No cervical, supraclavicular, axillary, or inguinal adenopathy.  Lungs: No dullness to percussion.  No rales, wheezes, rhonchi.  Heart: Regular rate and rhythm.  Abdomen: Bowel sounds present.  Soft, nontender, no hepatosplenomegaly or mass.  Extremities: No edema.     LABORATORY REVIEWED:  Component      Latest Ref Rng 4/1/2025  8:17 AM 5/5/2025  11:07 AM 6/2/2025  9:36 AM   WBC      4.0 - 11.0 10e3/uL   3.8 (L)    RBC Count      4.40 - 5.90 10e6/uL   3.97 (L)    Hemoglobin      13.3 - 17.7 g/dL   12.5 (L)    Hematocrit      40.0 - 53.0 %   36.3 (L)    MCV      78 - 100 fL   91    MCH      26.5 - 33.0 pg   31.5  "   MCHC      31.5 - 36.5 g/dL   34.4    RDW      10.0 - 15.0 %   13.9    Platelet Count      150 - 450 10e3/uL   188    % Neutrophils      %   57    % Lymphocytes      %   22    % Monocytes      %   8    % Eosinophils      %   13    % Basophils      %   1    % Immature Granulocytes      %   0    NRBC/W      <1 /100   0    Absolute Neutrophil      1.6 - 8.3 10e3/uL   2.2    Absolute Lymphocytes      0.8 - 5.3 10e3/uL   0.8    Absolute Monocytes      0.0 - 1.3 10e3/uL   0.3    Absolute Eosinophils      0.0 - 0.7 10e3/uL   0.5    Absolute Basophils      0.0 - 0.2 10e3/uL   0.0    Absolute Immature Granulocytes      <=0.4 10e3/uL   0.0    Absolute NRBCs      10e3/uL   0.0    Sodium      135 - 145 mmol/L   141    Potassium      3.4 - 5.3 mmol/L   4.0    Carbon Dioxide (CO2)      22 - 29 mmol/L   24    Anion Gap      7 - 15 mmol/L   10    Urea Nitrogen      8.0 - 23.0 mg/dL   13.9    Creatinine      0.67 - 1.17 mg/dL   1.03    GFR Estimate      >60 mL/min/1.73m2   78    Calcium      8.8 - 10.4 mg/dL   9.0    Chloride      98 - 107 mmol/L   107    Glucose      70 - 99 mg/dL   99    Alkaline Phosphatase      40 - 150 U/L   62    AST      0 - 45 U/L   24    ALT      0 - 70 U/L   20    Protein Total      6.4 - 8.3 g/dL   6.0 (L)    Albumin      3.5 - 5.2 g/dL   3.8    Bilirubin Total      <=1.2 mg/dL   0.3    Testosterone Total      240 - 950 ng/dL 193 (L)  6 (L)     PSA Tumor Marker      0.00 - 4.50 ng/mL 3.42  0.05         IMPRESSION/PLAN: Stage IVB adenocarcinoma prostate.  Prostate cancer is a [place] 4+3=7 adenocarcinoma with perineural invasion and extraprostatic extension, without locoregional or distant metastases.  Patient underwent definitive surgery (05/20/2020).  Postoperative PSA was undetectable (<0.1 ng/mL from 08/12/2020 to 05/27/2021) with subsequent biochemical pression with PSA 0.13 ng/mL (11/11/2021), PSA 0.35 ng/mL (02/22/2022).  There was no evidence of metastases on restaging 18-F-Fluciclovine PET/CT  scan (01/10/2022).  Patient received salvage radiation therapy to the prostate fossa and bilateral pelvic lymph nodes (from 04/18/2022 through 06/06/2022) without ADT.  There was a PSA alisa of 0.1 ng/mL (12/30/2022) followed by biochemical progression with PSA of 0.2 ng/mL (03/30/2023), PSA 0.3 ng/mL (10/24/2023).  PSA 0.76 ng mL (03/21/2024), PSA 0.97 ng/mL (05/02/2024), PSA 1.1 ng mL (07/31/2024), PSA 1.92 ng/mL (10/31/2024), PSA 2.2 ng mL (12/03/2024), PSA 3.18 ng/mL (02/27/2025).  There was no evidence of metastases on PSMA PET/CT scan (10/30/2023, 05/24/2024) with subsequent detection of skeletal metastases to the right T6 transverse process and left L2 pedicle detected on repeat PSMA PET/CT scan (12/20/2024).  Patient received SABR to the T6 and L2 metastasis (completed 02/24/2025). Patient is receiving first-line ADT consisting of leuprolide 22.5 mg every 3 months (beginning 04/01/2025) and enzalutamide 160 mg daily (beginning 04/22/2025).  There is an excellent biochemical response thus far. There is grade 1 fatigue, vasomotor symptoms, depressed mood, and anemia.  Enzalutamide/leuprolide will be continued without modification. I reviewed the risks and side effects of enzalutamide with the patient, which include fatigue, weakness, flushing, anorexia, weight loss, headache, dizziness, change in taste sensation, breast tenderness and swelling, constipation, diarrhea, peripheral edema, myalgias, arthralgias, falls, forgetfulness, depression, hypertension, and seizures.  Leuprolide is associated with fatigue, hot flashes, weakness, loss of muscle mass, weight gain, forgetfulness, depression, irritability breast enlargement, loss of libido, impotence, joint stiffness and pain, coronary artery disease, osteoporosis, and bone fracture.  Patient understood the indication and risks and agreed to continue therapy.  Patient will continue calcium plus vitamin D supplementation to treat osteopenia.  Citalopram 20 mg can  be tried if depression symptoms progress. Patient will continue interventions with his psychologist. Patient will return to clinic in 3 months with CBC, metabolic panel, PSA. The current and past history, clinical evaluation, reviewing diagnostic tests with the patient, and assessment, complex management of enzalutamide and leuprolide toxicities, and planning occurred over 30 minutes.       Александр Chavez MD    cc: MD Joseph Ellington MD Ryan K Funk, MD Basir U Tareen, MD

## 2025-06-02 ENCOUNTER — ONCOLOGY VISIT (OUTPATIENT)
Dept: ONCOLOGY | Facility: CLINIC | Age: 70
End: 2025-06-02
Attending: INTERNAL MEDICINE
Payer: MEDICARE

## 2025-06-02 VITALS
HEIGHT: 68 IN | DIASTOLIC BLOOD PRESSURE: 79 MMHG | TEMPERATURE: 97.8 F | SYSTOLIC BLOOD PRESSURE: 136 MMHG | BODY MASS INDEX: 25.19 KG/M2 | OXYGEN SATURATION: 98 % | HEART RATE: 77 BPM | WEIGHT: 166.2 LBS | RESPIRATION RATE: 16 BRPM

## 2025-06-02 DIAGNOSIS — C61 PROSTATE CANCER (H): Primary | ICD-10-CM

## 2025-06-02 DIAGNOSIS — C79.51 METASTASIS TO BONE (H): ICD-10-CM

## 2025-06-02 LAB
ALBUMIN SERPL BCG-MCNC: 3.8 G/DL (ref 3.5–5.2)
ALP SERPL-CCNC: 62 U/L (ref 40–150)
ALT SERPL W P-5'-P-CCNC: 20 U/L (ref 0–70)
ANION GAP SERPL CALCULATED.3IONS-SCNC: 10 MMOL/L (ref 7–15)
AST SERPL W P-5'-P-CCNC: 24 U/L (ref 0–45)
BASOPHILS # BLD AUTO: 0 10E3/UL (ref 0–0.2)
BASOPHILS NFR BLD AUTO: 1 %
BILIRUB SERPL-MCNC: 0.3 MG/DL
BUN SERPL-MCNC: 13.9 MG/DL (ref 8–23)
CALCIUM SERPL-MCNC: 9 MG/DL (ref 8.8–10.4)
CHLORIDE SERPL-SCNC: 107 MMOL/L (ref 98–107)
CREAT SERPL-MCNC: 1.03 MG/DL (ref 0.67–1.17)
EGFRCR SERPLBLD CKD-EPI 2021: 78 ML/MIN/1.73M2
EOSINOPHIL # BLD AUTO: 0.5 10E3/UL (ref 0–0.7)
EOSINOPHIL NFR BLD AUTO: 13 %
ERYTHROCYTE [DISTWIDTH] IN BLOOD BY AUTOMATED COUNT: 13.9 % (ref 10–15)
GLUCOSE SERPL-MCNC: 99 MG/DL (ref 70–99)
HCO3 SERPL-SCNC: 24 MMOL/L (ref 22–29)
HCT VFR BLD AUTO: 36.3 % (ref 40–53)
HGB BLD-MCNC: 12.5 G/DL (ref 13.3–17.7)
IMM GRANULOCYTES # BLD: 0 10E3/UL
IMM GRANULOCYTES NFR BLD: 0 %
LYMPHOCYTES # BLD AUTO: 0.8 10E3/UL (ref 0.8–5.3)
LYMPHOCYTES NFR BLD AUTO: 22 %
MCH RBC QN AUTO: 31.5 PG (ref 26.5–33)
MCHC RBC AUTO-ENTMCNC: 34.4 G/DL (ref 31.5–36.5)
MCV RBC AUTO: 91 FL (ref 78–100)
MONOCYTES # BLD AUTO: 0.3 10E3/UL (ref 0–1.3)
MONOCYTES NFR BLD AUTO: 8 %
NEUTROPHILS # BLD AUTO: 2.2 10E3/UL (ref 1.6–8.3)
NEUTROPHILS NFR BLD AUTO: 57 %
NRBC # BLD AUTO: 0 10E3/UL
NRBC BLD AUTO-RTO: 0 /100
PLATELET # BLD AUTO: 188 10E3/UL (ref 150–450)
POTASSIUM SERPL-SCNC: 4 MMOL/L (ref 3.4–5.3)
PROT SERPL-MCNC: 6 G/DL (ref 6.4–8.3)
PSA SERPL DL<=0.01 NG/ML-MCNC: <0.01 NG/ML (ref 0–6.5)
RBC # BLD AUTO: 3.97 10E6/UL (ref 4.4–5.9)
SODIUM SERPL-SCNC: 141 MMOL/L (ref 135–145)
WBC # BLD AUTO: 3.8 10E3/UL (ref 4–11)

## 2025-06-02 PROCEDURE — 85004 AUTOMATED DIFF WBC COUNT: CPT | Performed by: INTERNAL MEDICINE

## 2025-06-02 PROCEDURE — G0463 HOSPITAL OUTPT CLINIC VISIT: HCPCS | Performed by: INTERNAL MEDICINE

## 2025-06-02 PROCEDURE — 36415 COLL VENOUS BLD VENIPUNCTURE: CPT | Performed by: INTERNAL MEDICINE

## 2025-06-02 PROCEDURE — 82310 ASSAY OF CALCIUM: CPT | Performed by: INTERNAL MEDICINE

## 2025-06-02 PROCEDURE — 99214 OFFICE O/P EST MOD 30 MIN: CPT | Performed by: INTERNAL MEDICINE

## 2025-06-02 PROCEDURE — 84153 ASSAY OF PSA TOTAL: CPT | Performed by: INTERNAL MEDICINE

## 2025-06-02 ASSESSMENT — PAIN SCALES - GENERAL: PAINLEVEL_OUTOF10: NO PAIN (0)

## 2025-06-02 NOTE — NURSING NOTE
Chief Complaint   Patient presents with    Oncology Clinic Visit     Prostate CA    Blood Draw     Labs drawn via  by RN in lab. VS taken.      Labs collected from venipuncture by RN. Vitals taken. Checked in for appointment(s).    Robyn Rojas RN

## 2025-06-02 NOTE — LETTER
6/2/2025      Inocente Rios  [address]  [facility] [address]      Dear Colleague,    Thank you for referring your patient, Inocente Rios, to the [facility]. Please see a copy of my visit note below.      PRIMARY CARE PHYSICIAN: Slade Parisi MD  CARDIOLOGY: Joseph Cronin MD  RADIATION ONCOLOGY: Derrick Brennan MD  UROLOGY: Bing Angel MD    HISTORY OF PRESENT ILLNESS: Patient is a 70-year-old male with stage IVB adenocarcinoma prostate (pT3a, pN0, cM1b, PSA 8.6 ng/mL, grade group 3).  Patient was diagnosed with stage III adenocarcinoma prostate (pT3a, pN0, cM0, PSA 8.6 ng/mL, grade group 3), and presented with an elevated PSA of 8.6 ng/mL (01/03/2020). Transrectal ultrasound-guided prostate core needle biopsy 02/27/2020, revealed a [place] 4+3=7 adenocarcinoma involving 75% of the core needle biopsy sample from the right mid medial, 75% of the biopsy sample from the right mid lateral, 90% of the biopsy sample from the right medial base, 70% of the biopsy sample with perineural invasion from the right lateral base.  There is a [place] 3+4=7 adenocarcinoma involving 30% of the core needle biopsy sample from the left medial apex, 50% of the biopsy sample from the left lateral apex, 25% of the biopsy sample from the left mid medial, 55% of the biopsy sample from the left medial base.  There was a [place] 3+3=6 adenocarcinoma involving 20% of the core needle biopsy sample from the right medial apex, 5-10% of the biopsy sample from the right lateral apex, 30% of the biopsy sample from the left mid lateral.  There was high-grade prostatic intraepithelial neoplasia with rare adjacent small atypical glands in the core needle biopsy sample from the left lateral base (11/12 biopsy samples involved). CT abdomen, pelvis 05/04/2020, was negative for adenopathy or metastases.  There was heterogeneous enhancement of the prostate with 9 mm enhancing nodule along the right posterior  aspect of the prostate.  Bone scan 05/04/2020 was negative for metastases.  There was mild uptake in the bilateral acromioclavicular joints and right knee. Patient underwent robotic-assisted laparoscopic radical prostatectomy and bilateral pelvic lymph node dissection 05/20/2020, there revealed a [place] 4+3=7 acinar and ductal adenocarcinoma involving 35% of the prostate with the largest focus measuring 1.5 cm.  There was nonfocal extraprostatic extension involving the right posterior prostate.  There was perineural, but no lymphovascular invasion.  There was no bladder neck invasion or seminal vesicle involvement.  Surgical margins were tumor free. Two pelvic lymph nodes were negative for metastases (0/2 lymph nodes involved). NGS testing ([employer]) on the prostatectomy sample (05/20/2020), revealed a CDKN1B exon 1, p.Q65*, c.193C>T, and CREBBP exon 3, p.T291fs, c.870delC pathogenic variants.  There was a CDKN1B exon 1, p.D63V, c.188A>T variant of uncertain significance.  Microsatellite instability status was MS-stable.  Tumor mutational burden was 5 Muts/Mb. gLOH was low at 6%.     PSA was undetectable at <0.1 ng/mL (from 08/12/2020 to 05/27/2021) with subsequent biochemical pression with PSA 0.13 ng/mL (11/11/2021), PSA 0.15 ng/mL (12/03/2021), PSA 0.35 ng/mL (02/22/2022).  Decipher Genomic Risk 12/22/2021) revealed a decipher score of 0.32 (low risk).  18-F-Fluciclovine PET/CT scan 01/10/2022, revealed postsurgical changes related to prior prostatectomy and pelvic lymph node dissection.  There were no suspicious PSMA-avid lesions.  Patient received salvage radiation therapy to the prostate fossa (6800 cGy in 34 fractions) and bilateral pelvic lymph nodes (4500 cGy in 25 fractions) from 04/18/2022 through 06/06/2022, without androgen deprivation therapy (ADT).  There was a PSA alisa of 0.1 ng/mL (12/30/2022) followed by a rising PSA of 0.2 ng/mL (03/30/2023), PSA 0.3 ng/mL (10/24/2023).  PSMA PET/CT scan  10/30/2023, was negative for PSMA-avid lesions.    There was continued biochemical progression with PSA 0.6 ng/mL (01/21/2024), PSA 0.76 ng mL (03/21/2024), PSA 0.97 ng/mL (05/02/2024).  PSMA PET/CT scan 05/24/2024, showed postsurgical changes related to prior prostatectomy.  There were no PSMA-avid metastases.  Patient continued observation with PSA 1.1 ng mL (07/31/2024), PSA 1.92 ng/mL (10/31/2024), PSA 2.2 ng mL (12/03/2024).  PSMA PET/CT scan 12/20/2024, revealed PSMA-avid metastases in the right T6 transverse process and left L2 pedicle. Patient received stereotactic ablative radiation therapy (SABR) to the T6 right transverse process (30 Gy in 3 fractions), T6 vertebra (21 Gy in 30 fractions), L2 left pedicle (30 Gy in 3 fractions), and L2 vertebra (21 Gy in 3 fractions) from 02/18/2025 through 02/20/2025.  Patient is receiving first-line ADT consisting of leuprolide 22.5 mg every 3 months beginning 04/01/2025, and enzalutamide 160 mg daily beginning 04/22/2025.  Patient has fatigue, increase in hot flashes, and depressed mood related to ongoing divorce and living situation.  Patient has impotence since prostate cancer surgery and radiation therapy. There are no other new symptoms or events since the prior clinic visit (05/05/2025).  Patient denies fever, anorexia, weight loss, headache, cough, dyspnea, chest pain, abdominal pain, nausea, vomiting, constipation, diarrhea.     PAST HISTORY: Past history was reviewed and unchanged from the clinic note 01/15/2025.     MEDICATIONS:   Current Outpatient Medications   Medication Sig Dispense Refill     albuterol (PROAIR HFA/PROVENTIL HFA/VENTOLIN HFA) 108 (90 Base) MCG/ACT inhaler Inhale 2 puffs into the lungs every 4 hours as needed for shortness of breath / dyspnea or wheezing 18 g 1     ASPIRIN PO Take 81 mg by mouth       atorvastatin (LIPITOR) 40 MG tablet TAKE 1 TABLET (40 MG TOTAL) BY MOUTH DAILY. (Patient not taking: Reported on 5/5/2025) 90 tablet 0      "calcium carbonate-vitamin D (OSCAL) 500-5 MG-MCG tablet Take 1 tablet by mouth 2 times daily. 120 tablet 5     enzalutamide (XTANDI) 80 MG tablet Take 2 tablets (160 mg) by mouth daily. 60 tablet 0     enzalutamide (XTANDI) 80 MG tablet Take 2 tablets (160 mg) by mouth daily. 60 tablet 0     ketoconazole (NIZORAL) 2 % external cream APPLY DAILY TO SKIN TO AFFECTED AREA TWICE A DAY FOR 2 WEEKS       leuprolide (LUPRON DEPOT, 3-MONTH,) 22.5 MG kit as directed Intramuscular       metoprolol succinate ER (TOPROL XL) 25 MG 24 hr tablet Take 1 tablet (25 mg) by mouth daily. 90 tablet 3     order for DME Equipment being ordered: Home blood pressure monitor (Patient not taking: Reported on 2/27/2025) 1 Units 0     PERMETHRIN EX 20-30 mmHg thigh high compression stockings.  Wear as directed.       prochlorperazine (COMPAZINE) 5 MG tablet Take 1 tablet (5 mg) by mouth every 6 hours as needed for nausea or vomiting. 30 tablet 2     tadalafil (CIALIS) 20 MG tablet Take 10 mg by mouth twice a week         REVIEW OF SYSTEMS: Review of systems reviewed with the patient and otherwise negative except for those detailed above.    PHYSICAL EXAM: /79 (BP Location: Right arm, Patient Position: Sitting, Cuff Size: Adult Regular)   Pulse 77   Temp 97.8  F (36.6  C) (Oral)   Resp 16   Ht 1.727 m (5' 7.99\")   Wt 75.4 kg (166 lb 3.2 oz)   SpO2 98%   BMI 25.28 kg/m  . ECOG performance status: 0. Physical exam was unchanged from prior clinic visit 05/05/2025.  Skin: No erythema or rash.  HEENT: Sclera nonicteric. Oropharynx without lesions or ulceration, mucosa pink and moist.  Nodes: No cervical, supraclavicular, axillary, or inguinal adenopathy.  Lungs: No dullness to percussion.  No rales, wheezes, rhonchi.  Heart: Regular rate and rhythm.  Abdomen: Bowel sounds present.  Soft, nontender, no hepatosplenomegaly or mass.  Extremities: No edema.     LABORATORY REVIEWED:  Component      Latest Ref Rng 4/1/2025  8:17 AM " 5/5/2025  11:07 AM 6/2/2025  9:36 AM   WBC      4.0 - 11.0 10e3/uL   3.8 (L)    RBC Count      4.40 - 5.90 10e6/uL   3.97 (L)    Hemoglobin      13.3 - 17.7 g/dL   12.5 (L)    Hematocrit      40.0 - 53.0 %   36.3 (L)    MCV      78 - 100 fL   91    MCH      26.5 - 33.0 pg   31.5    MCHC      31.5 - 36.5 g/dL   34.4    RDW      10.0 - 15.0 %   13.9    Platelet Count      150 - 450 10e3/uL   188    % Neutrophils      %   57    % Lymphocytes      %   22    % Monocytes      %   8    % Eosinophils      %   13    % Basophils      %   1    % Immature Granulocytes      %   0    NRBC/W      <1 /100   0    Absolute Neutrophil      1.6 - 8.3 10e3/uL   2.2    Absolute Lymphocytes      0.8 - 5.3 10e3/uL   0.8    Absolute Monocytes      0.0 - 1.3 10e3/uL   0.3    Absolute Eosinophils      0.0 - 0.7 10e3/uL   0.5    Absolute Basophils      0.0 - 0.2 10e3/uL   0.0    Absolute Immature Granulocytes      <=0.4 10e3/uL   0.0    Absolute NRBCs      10e3/uL   0.0    Sodium      135 - 145 mmol/L   141    Potassium      3.4 - 5.3 mmol/L   4.0    Carbon Dioxide (CO2)      22 - 29 mmol/L   24    Anion Gap      7 - 15 mmol/L   10    Urea Nitrogen      8.0 - 23.0 mg/dL   13.9    Creatinine      0.67 - 1.17 mg/dL   1.03    GFR Estimate      >60 mL/min/1.73m2   78    Calcium      8.8 - 10.4 mg/dL   9.0    Chloride      98 - 107 mmol/L   107    Glucose      70 - 99 mg/dL   99    Alkaline Phosphatase      40 - 150 U/L   62    AST      0 - 45 U/L   24    ALT      0 - 70 U/L   20    Protein Total      6.4 - 8.3 g/dL   6.0 (L)    Albumin      3.5 - 5.2 g/dL   3.8    Bilirubin Total      <=1.2 mg/dL   0.3    Testosterone Total      240 - 950 ng/dL 193 (L)  6 (L)     PSA Tumor Marker      0.00 - 4.50 ng/mL 3.42  0.05         IMPRESSION/PLAN: Stage IVB adenocarcinoma prostate.  Prostate cancer is a [place] 4+3=7 adenocarcinoma with perineural invasion and extraprostatic extension, without locoregional or distant metastases.  Patient underwent  definitive surgery (05/20/2020).  Postoperative PSA was undetectable (<0.1 ng/mL from 08/12/2020 to 05/27/2021) with subsequent biochemical pression with PSA 0.13 ng/mL (11/11/2021), PSA 0.35 ng/mL (02/22/2022).  There was no evidence of metastases on restaging 18-F-Fluciclovine PET/CT scan (01/10/2022).  Patient received salvage radiation therapy to the prostate fossa and bilateral pelvic lymph nodes (from 04/18/2022 through 06/06/2022) without ADT.  There was a PSA alisa of 0.1 ng/mL (12/30/2022) followed by biochemical progression with PSA of 0.2 ng/mL (03/30/2023), PSA 0.3 ng/mL (10/24/2023).  PSA 0.76 ng mL (03/21/2024), PSA 0.97 ng/mL (05/02/2024), PSA 1.1 ng mL (07/31/2024), PSA 1.92 ng/mL (10/31/2024), PSA 2.2 ng mL (12/03/2024), PSA 3.18 ng/mL (02/27/2025).  There was no evidence of metastases on PSMA PET/CT scan (10/30/2023, 05/24/2024) with subsequent detection of skeletal metastases to the right T6 transverse process and left L2 pedicle detected on repeat PSMA PET/CT scan (12/20/2024).  Patient received SABR to the T6 and L2 metastasis (completed 02/24/2025). Patient is receiving first-line ADT consisting of leuprolide 22.5 mg every 3 months (beginning 04/01/2025) and enzalutamide 160 mg daily (beginning 04/22/2025).  There is an excellent biochemical response thus far. There is grade 1 fatigue, vasomotor symptoms, depressed mood, and anemia.  Enzalutamide/leuprolide will be continued without modification. I reviewed the risks and side effects of enzalutamide with the patient, which include fatigue, weakness, flushing, anorexia, weight loss, headache, dizziness, change in taste sensation, breast tenderness and swelling, constipation, diarrhea, peripheral edema, myalgias, arthralgias, falls, forgetfulness, depression, hypertension, and seizures.  Leuprolide is associated with fatigue, hot flashes, weakness, loss of muscle mass, weight gain, forgetfulness, depression, irritability breast enlargement, loss  of libido, impotence, joint stiffness and pain, coronary artery disease, osteoporosis, and bone fracture.  Patient understood the indication and risks and agreed to continue therapy.  Patient will continue calcium plus vitamin D supplementation to treat osteopenia.  Citalopram 20 mg can be tried if depression symptoms progress. Patient will continue interventions with his psychologist. Patient will return to clinic in 3 months with CBC, metabolic panel, PSA. The current and past history, clinical evaluation, reviewing diagnostic tests with the patient, and assessment, complex management of enzalutamide and leuprolide toxicities, and planning occurred over 30 minutes.       Александр Chavez MD    cc: MD Joseph Ellington MD Ryan K Funk, MD Basir U Tareen, MD    Again, thank you for allowing me to participate in the care of your patient.        Sincerely,        Александр Chavez MD    Electronically signed

## 2025-06-02 NOTE — NURSING NOTE
"Oncology Rooming Note    June 2, 2025 9:48 AM   Inocente Rios is a 70 year old male who presents for:    Chief Complaint   Patient presents with    Oncology Clinic Visit     Prostate CA    Blood Draw     Labs drawn via  by RN in lab. VS taken.      Initial Vitals: /79 (BP Location: Right arm, Patient Position: Sitting, Cuff Size: Adult Regular)   Pulse 77   Temp 97.8  F (36.6  C) (Oral)   Resp 16   Ht 1.727 m (5' 7.99\")   Wt 75.4 kg (166 lb 3.2 oz)   SpO2 98%   BMI 25.28 kg/m   Estimated body mass index is 25.28 kg/m  as calculated from the following:    Height as of this encounter: 1.727 m (5' 7.99\").    Weight as of this encounter: 75.4 kg (166 lb 3.2 oz). Body surface area is 1.9 meters squared.  No Pain (0) Comment: Data Unavailable   No LMP for male patient.  Allergies reviewed: Yes  Medications reviewed: Yes    Medications: Medication refills not needed today.  Pharmacy name entered into [employer]:    [facility] - [place], [place] - [address]  [id number] - [place], [place] - [address]  [facility] - [place], [place] - [address]    Frailty Screening:   Is the patient here for a new oncology consult visit in cancer care? 2. No    PHQ9:  Did this patient require a PHQ9?: No      Clinical concerns:        Duyen Rush              "

## 2025-06-04 ENCOUNTER — TELEPHONE (OUTPATIENT)
Dept: ONCOLOGY | Facility: CLINIC | Age: 70
End: 2025-06-04
Payer: MEDICARE

## 2025-06-04 NOTE — TELEPHONE ENCOUNTER
Oral Chemotherapy Monitoring Program     Placed call to patient in follow up of enzalutamide (Xtandi) therapy.     Left message to please call back in follow up of therapy. No patient or drug names were mentioned.    Shabbir Do  Pharmacy Intern  Oral Chemotherapy Monitoring Program  [facility]   [phone]

## 2025-06-04 NOTE — TELEPHONE ENCOUNTER
Oral Chemotherapy Monitoring Program    Subjective/Objective:  Inocente Rios is a 70 year old male contacted by phone for a follow-up visit for oral chemotherapy.  Inocente is taking his enzalutamide (Xtandi) as 2 tablets (160 mg) by mouth once daily, has not missed a dose in the past 2 weeks, and is adherent. He has been doing well with few complications, but has experiences some fatigue, hot flashes, and intense emotions. His fatigue is minimal and mostly impacts him during intense activities at work. His hot flashes occur about 4 times a day and can last between 10-15 minutes. He manages this through removing clothing until the hot flash resolves. His emotions tend to intensify from life and societal events. He denies SOB, nausea, vomiting, diarrhea, constipation, and arthralgias. He is not currently taking his blood pressure at home.        1/17/2025    11:00 AM 4/10/2025    11:00 AM 4/30/2025     1:00 PM 5/15/2025     9:00 AM 5/30/2025    11:00 AM 6/4/2025     1:00 PM 6/4/2025     3:00 PM   ORAL CHEMOTHERAPY   Assessment Type Initial Work up New Teach;Refill Initial Follow up Refill Left Voicemail Left Voicemail Monthly Follow up   Diagnosis Code Prostate Cancer Prostate Cancer Prostate Cancer Prostate Cancer Prostate Cancer Prostate Cancer Prostate Cancer   Providers Kathy Chavez   [facility]   Is this patient followed by the [facility] OC team? No No        Drug Name Xtandi (enzalutamide)  Xtandi (enzalutamide) Xtandi (enzalutamide) Xtandi (enzalutamide) Xtandi (enzalutamide) Xtandi (enzalutamide) Xtandi (enzalutamide)   Dose 160 mg  160 mg  160 mg  160 mg  160 mg 160 mg 160 mg   Current Schedule Daily Daily Daily Daily Daily Daily Daily   Cycle Details Continuous  Continuous Continuous Continuous Continuous Continuous Continuous   Start Date of Last Cycle   4/22/2025 5/22/2025    "  Planned next cycle start date  4/22/2025 5/22/2025       Doses missed in last 2 weeks   0    0   Adherence Assessment   Adherent    Adherent   Adverse Effects   No AE identified during assessment    Fatigue;Other (See Note for Details)   Fatigue       Grade 1   Pharmacist Intervention(fatigue)       No   Other (See Note for Details)       Hot flashes   Pharmacist intervention(other)       No   Home BPs   not done       Any new drug interactions?  No        Is the dose as ordered appropriate for the patient?  Yes        Was a medication prescribed as part of a CPA?  No            Data saved with a previous flowsheet row definition       Last PHQ-2 Score on record:       2/27/2025     9:33 AM 1/3/2020     8:07 AM   PHQ-2 ( 1999 [employer])   Q1: Little interest or pleasure in doing things 1 0   Q2: Feeling down, depressed or hopeless 1 0   PHQ-2 Score 2 0   PHQ-2 Total Score (12-17 Years)- Positive if 3 or more points; Administer PHQ-A if positive  0       Vitals:  BP:   BP Readings from Last 1 Encounters:   06/02/25 136/79     Wt Readings from Last 1 Encounters:   06/02/25 75.4 kg (166 lb 3.2 oz)     Estimated body surface area is 1.9 meters squared as calculated from the following:    Height as of 6/2/25: 1.727 m (5' 7.99\").    Weight as of 6/2/25: 75.4 kg (166 lb 3.2 oz).    Labs:  _  Result Component Current Result Ref Range   Sodium 141 (6/2/2025) 135 - 145 mmol/L     _  Result Component Current Result Ref Range   Potassium 4.0 (6/2/2025) 3.4 - 5.3 mmol/L     _  Result Component Current Result Ref Range   Calcium 9.0 (6/2/2025) 8.8 - 10.4 mg/dL     No results found for Mag within last 30 days.     No results found for Phos within last 30 days.     _  Result Component Current Result Ref Range   Albumin 3.8 (6/2/2025) 3.5 - 5.2 g/dL     _  Result Component Current Result Ref Range   Urea Nitrogen 13.9 (6/2/2025) 8.0 - 23.0 mg/dL     _  Result Component Current Result Ref Range   Creatinine 1.03 (6/2/2025) 0.67 - 1.17 " mg/dL     _  Result Component Current Result Ref Range   AST 24 (6/2/2025) 0 - 45 U/L     _  Result Component Current Result Ref Range   ALT 20 (6/2/2025) 0 - 70 U/L     _  Result Component Current Result Ref Range   Bilirubin Total 0.3 (6/2/2025) <=1.2 mg/dL     _  Result Component Current Result Ref Range   WBC Count 3.8 (L) (6/2/2025) 4.0 - 11.0 10e3/uL     _  Result Component Current Result Ref Range   Hemoglobin 12.5 (L) (6/2/2025) 13.3 - 17.7 g/dL     _  Result Component Current Result Ref Range   Platelet Count 188 (6/2/2025) 150 - 450 10e3/uL     _  Result Component Current Result Ref Range   Absolute Neutrophils 2.2 (6/2/2025) 1.6 - 8.3 10e3/uL     No results found for ANC within last 30 days.          Assessment/Plan:  Inocente states that his Xtandi medication adverse effects have been manageable and have not impacted his daily life significantly. Dr. Chavez is aware of these effects and was discussed in his last appointment. While Inocente does not take his blood pressure at home, he was educated on proper technique for when he is able. He was also concerned about his medication coverage and was referred to the oncology liaisons.     Overall, Inocente is doing well with his Xtandi and was told to continue to take his medication as prescribed.     Follow-Up:  Next labs: 6/24  Next oncology appointment: 8/4    Refill Due:  6/21    Shabbir Do  Pharmacy Intern  Oral Chemotherapy Monitoring Program  [facility]  [phone]

## 2025-06-05 ENCOUNTER — TELEPHONE (OUTPATIENT)
Dept: ONCOLOGY | Facility: CLINIC | Age: 70
End: 2025-06-05
Payer: MEDICARE

## 2025-06-05 NOTE — ORAL ONC MGMT
Oral Chemotherapy Monitoring Program     Patient experienced a hand injury yesterday while working as a . His ring-finger tendon slipped out of place when he was using a tool, and it moved all the way over onto his thumb. No pain, just sore. Has it splinted to surrounding fingers. Will see PMD today. Wanted to know if Xtandi could cause his tendons to do this.     Per PI/UpToDate  Neuromuscular & skeletal: Arthralgia (21%), back pain (19% to 29%), bone fracture (4% to 13%), musculoskeletal pain (6% to 16%). I explained that the Xtandi could be playing a role but unable to give a solid answer. Patient appreciated the information. All questions answered.    Laura Santiago, PharmD  Oral Chemotherapy Monitoring Program  [facility]  [phone]

## 2025-06-24 ENCOUNTER — INFUSION THERAPY VISIT (OUTPATIENT)
Dept: ONCOLOGY | Facility: CLINIC | Age: 70
End: 2025-06-24
Attending: INTERNAL MEDICINE
Payer: MEDICARE

## 2025-06-24 VITALS
SYSTOLIC BLOOD PRESSURE: 150 MMHG | RESPIRATION RATE: 15 BRPM | TEMPERATURE: 98.5 F | HEART RATE: 72 BPM | OXYGEN SATURATION: 99 % | DIASTOLIC BLOOD PRESSURE: 77 MMHG

## 2025-06-24 DIAGNOSIS — C79.51 METASTASIS TO BONE (H): ICD-10-CM

## 2025-06-24 DIAGNOSIS — C61 PROSTATE CANCER (H): Primary | ICD-10-CM

## 2025-06-24 PROCEDURE — 96402 CHEMO HORMON ANTINEOPL SQ/IM: CPT

## 2025-06-24 PROCEDURE — 250N000011 HC RX IP 250 OP 636: Mod: JZ | Performed by: INTERNAL MEDICINE

## 2025-06-24 RX ADMIN — LEUPROLIDE ACETATE 22.5 MG: 22.5 INJECTION, SUSPENSION, EXTENDED RELEASE SUBCUTANEOUS at 08:26

## 2025-06-24 NOTE — PROGRESS NOTES
Nursing Note:     Patient presents today for Eligard injection.   Patient seen by provider today: No   present during visit today: Not Applicable.     Note: I released the medication and delegated administration to the supportive staff team member. Please see MAR and administration note for additional details.     Treatment Conditions:  Not Applicable.    Beatriz Franoc RN

## 2025-07-07 ENCOUNTER — LAB (OUTPATIENT)
Dept: LAB | Facility: CLINIC | Age: 70
End: 2025-07-07
Attending: INTERNAL MEDICINE
Payer: MEDICARE

## 2025-07-07 ENCOUNTER — TELEPHONE (OUTPATIENT)
Dept: ONCOLOGY | Facility: CLINIC | Age: 70
End: 2025-07-07

## 2025-07-07 DIAGNOSIS — C61 PROSTATE CANCER (H): ICD-10-CM

## 2025-07-07 DIAGNOSIS — C79.51 METASTASIS TO BONE (H): ICD-10-CM

## 2025-07-07 LAB
ALBUMIN SERPL BCG-MCNC: 4 G/DL (ref 3.5–5.2)
ALP SERPL-CCNC: 65 U/L (ref 40–150)
ALT SERPL W P-5'-P-CCNC: 20 U/L (ref 0–70)
ANION GAP SERPL CALCULATED.3IONS-SCNC: 11 MMOL/L (ref 7–15)
AST SERPL W P-5'-P-CCNC: 28 U/L (ref 0–45)
BASOPHILS # BLD AUTO: 0 10E3/UL (ref 0–0.2)
BASOPHILS NFR BLD AUTO: 1 %
BILIRUB SERPL-MCNC: 0.4 MG/DL
BUN SERPL-MCNC: 13.3 MG/DL (ref 8–23)
CALCIUM SERPL-MCNC: 9.3 MG/DL (ref 8.8–10.4)
CHLORIDE SERPL-SCNC: 108 MMOL/L (ref 98–107)
CREAT SERPL-MCNC: 1.1 MG/DL (ref 0.67–1.17)
EGFRCR SERPLBLD CKD-EPI 2021: 72 ML/MIN/1.73M2
EOSINOPHIL # BLD AUTO: 0.4 10E3/UL (ref 0–0.7)
EOSINOPHIL NFR BLD AUTO: 10 %
ERYTHROCYTE [DISTWIDTH] IN BLOOD BY AUTOMATED COUNT: 13.4 % (ref 10–15)
GLUCOSE SERPL-MCNC: 82 MG/DL (ref 70–99)
HCO3 SERPL-SCNC: 23 MMOL/L (ref 22–29)
HCT VFR BLD AUTO: 37.8 % (ref 40–53)
HGB BLD-MCNC: 12.6 G/DL (ref 13.3–17.7)
IMM GRANULOCYTES # BLD: 0 10E3/UL
IMM GRANULOCYTES NFR BLD: 0 %
LYMPHOCYTES # BLD AUTO: 0.9 10E3/UL (ref 0.8–5.3)
LYMPHOCYTES NFR BLD AUTO: 22 %
MCH RBC QN AUTO: 31 PG (ref 26.5–33)
MCHC RBC AUTO-ENTMCNC: 33.3 G/DL (ref 31.5–36.5)
MCV RBC AUTO: 93 FL (ref 78–100)
MONOCYTES # BLD AUTO: 0.3 10E3/UL (ref 0–1.3)
MONOCYTES NFR BLD AUTO: 8 %
NEUTROPHILS # BLD AUTO: 2.4 10E3/UL (ref 1.6–8.3)
NEUTROPHILS NFR BLD AUTO: 59 %
NRBC # BLD AUTO: 0 10E3/UL
NRBC BLD AUTO-RTO: 0 /100
PLATELET # BLD AUTO: 214 10E3/UL (ref 150–450)
POTASSIUM SERPL-SCNC: 3.9 MMOL/L (ref 3.4–5.3)
PROT SERPL-MCNC: 6.1 G/DL (ref 6.4–8.3)
RBC # BLD AUTO: 4.07 10E6/UL (ref 4.4–5.9)
SODIUM SERPL-SCNC: 142 MMOL/L (ref 135–145)
WBC # BLD AUTO: 4 10E3/UL (ref 4–11)

## 2025-07-07 PROCEDURE — 36415 COLL VENOUS BLD VENIPUNCTURE: CPT

## 2025-07-07 PROCEDURE — 84155 ASSAY OF PROTEIN SERUM: CPT

## 2025-07-07 PROCEDURE — 85004 AUTOMATED DIFF WBC COUNT: CPT

## 2025-07-07 NOTE — TELEPHONE ENCOUNTER
Oral Chemotherapy Monitoring Program    Placed call to patient in follow up of enzalutamide (Xtandi) therapy.    Left message to please call back in follow up of therapy. No patient or drug names were mentioned.    Kelechi Cao  Pharmacy Intern  Oral Chemotherapy Monitoring Program  [facility]  [phone]

## 2025-07-07 NOTE — NURSING NOTE
Chief Complaint   Patient presents with    Labs Only     Labs drawn from venipuncture by RN     Labs collected from venipuncture by RN.     Lucero Benson RN

## 2025-07-14 DIAGNOSIS — C79.51 METASTASIS TO BONE (H): Primary | ICD-10-CM

## 2025-07-14 DIAGNOSIS — C61 PROSTATE CANCER (H): ICD-10-CM

## 2025-07-15 ENCOUNTER — TELEPHONE (OUTPATIENT)
Dept: ONCOLOGY | Facility: CLINIC | Age: 70
End: 2025-07-15
Payer: MEDICARE

## 2025-07-15 NOTE — ORAL ONC MGMT
Oral Chemotherapy Monitoring Program      Subjective/Objective:  Inocente Rios is a 70 year old male contacted by phone for a follow-up visit for oral chemotherapy.  Pt confirms they are taking enzalutamide (Xtandi) 2 tablet (160 mg) daily. Pt reports no missed doses. Pt reports tolerating the medication well. Pt denies side effects including hypertension, fatigue, arthralgias, or constipation/diarrhea.      Assessment/Plan:  Pt is currently tolerating therapy well. Plan to continue Xtandi as prescribed. Pt verbalized understanding and agrees to plan. Encouraged pt to call with any issues or concerns.    Follow-Up:  8/4: labs and Dr Chavez visit    Kenia Jimenez, PharmD  Oral Chemotherapy Monitoring Program  [facility]  [phone]  July 15, 2025              6/4/2025     3:00 PM 6/5/2025    12:00 PM 6/13/2025     9:00 AM 7/7/2025     3:00 PM 7/8/2025    10:00 AM 7/14/2025     2:00 PM 7/15/2025     2:00 PM   ORAL CHEMOTHERAPY   Assessment Type Monthly Follow up Incoming phone call Refill Lab Monitoring;Monthly Follow up;Left Voicemail Lab Monitoring Refill Monthly Follow up   Diagnosis Code Prostate Cancer Prostate Cancer Prostate Cancer Prostate Cancer Prostate Cancer Prostate Cancer Prostate Cancer   Providers Dr. Kathy Chavez   [facility]   Is this patient followed by the [facility] team?  No No No No No No   Drug Name Xtandi (enzalutamide)  Xtandi (enzalutamide)  Xtandi (enzalutamide) Xtandi (enzalutamide) Xtandi (enzalutamide) Xtandi (enzalutamide) Xtandi (enzalutamide)   Dose 160 mg  160 mg  160 mg 160 mg 160 mg 160 mg 160 mg   Current Schedule Daily  Daily  Daily Daily Daily Daily Daily   Cycle Details Continuous Continuous Continuous Continuous Continuous Continuous Continuous   Doses missed in last 2 weeks 0      0   Adherence Assessment Adherent      Adherent  "  Adverse Effects Fatigue;Other (See Note for Details)    No AE identified during assessment  No AE identified during assessment   Fatigue Grade 1         Pharmacist Intervention(fatigue) No         Other (See Note for Details) Hot flashes         Pharmacist intervention(other) No         Is the dose as ordered appropriate for the patient?       Yes       Data saved with a previous flowsheet row definition       Vitals:  BP:   BP Readings from Last 1 Encounters:   06/24/25 (!) 150/77     Wt Readings from Last 1 Encounters:   06/02/25 75.4 kg (166 lb 3.2 oz)     Estimated body surface area is 1.9 meters squared as calculated from the following:    Height as of 6/2/25: 1.727 m (5' 7.99\").    Weight as of 6/2/25: 75.4 kg (166 lb 3.2 oz).    Labs:  _  Result Component Current Result Ref Range   Sodium 142 (7/7/2025) 135 - 145 mmol/L     _  Result Component Current Result Ref Range   Potassium 3.9 (7/7/2025) 3.4 - 5.3 mmol/L     _  Result Component Current Result Ref Range   Calcium 9.3 (7/7/2025) 8.8 - 10.4 mg/dL     No results found for Mag within last 30 days.     No results found for Phos within last 30 days.     _  Result Component Current Result Ref Range   Albumin 4.0 (7/7/2025) 3.5 - 5.2 g/dL     _  Result Component Current Result Ref Range   Urea Nitrogen 13.3 (7/7/2025) 8.0 - 23.0 mg/dL     _  Result Component Current Result Ref Range   Creatinine 1.10 (7/7/2025) 0.67 - 1.17 mg/dL       _  Result Component Current Result Ref Range   AST 28 (7/7/2025) 0 - 45 U/L     _  Result Component Current Result Ref Range   ALT 20 (7/7/2025) 0 - 70 U/L     _  Result Component Current Result Ref Range   Bilirubin Total 0.4 (7/7/2025) <=1.2 mg/dL       _  Result Component Current Result Ref Range   WBC Count 4.0 (7/7/2025) 4.0 - 11.0 10e3/uL     _  Result Component Current Result Ref Range   Hemoglobin 12.6 (L) (7/7/2025) 13.3 - 17.7 g/dL     _  Result Component Current Result Ref Range   Platelet Count 214 (7/7/2025) 150 - " 450 10e3/uL     _  Result Component Current Result Ref Range   Absolute Neutrophils 2.4 (7/7/2025) 1.6 - 8.3 10e3/uL

## 2025-08-04 ENCOUNTER — ONCOLOGY VISIT (OUTPATIENT)
Dept: ONCOLOGY | Facility: CLINIC | Age: 70
End: 2025-08-04
Attending: INTERNAL MEDICINE
Payer: MEDICARE

## 2025-08-04 VITALS
TEMPERATURE: 97.8 F | OXYGEN SATURATION: 99 % | HEIGHT: 68 IN | HEART RATE: 68 BPM | WEIGHT: 165.6 LBS | BODY MASS INDEX: 25.1 KG/M2 | SYSTOLIC BLOOD PRESSURE: 142 MMHG | DIASTOLIC BLOOD PRESSURE: 83 MMHG | RESPIRATION RATE: 16 BRPM

## 2025-08-04 DIAGNOSIS — C61 PROSTATE CANCER (H): Primary | ICD-10-CM

## 2025-08-04 DIAGNOSIS — C79.51 METASTASIS TO BONE (H): ICD-10-CM

## 2025-08-04 LAB
ALBUMIN SERPL BCG-MCNC: 3.9 G/DL (ref 3.5–5.2)
ALP SERPL-CCNC: 69 U/L (ref 40–150)
ALT SERPL W P-5'-P-CCNC: 16 U/L (ref 0–70)
ANION GAP SERPL CALCULATED.3IONS-SCNC: 10 MMOL/L (ref 7–15)
AST SERPL W P-5'-P-CCNC: 21 U/L (ref 0–45)
BASOPHILS # BLD AUTO: 0 10E3/UL (ref 0–0.2)
BASOPHILS NFR BLD AUTO: 1 %
BILIRUB SERPL-MCNC: 0.4 MG/DL
BUN SERPL-MCNC: 12.5 MG/DL (ref 8–23)
CALCIUM SERPL-MCNC: 9.1 MG/DL (ref 8.8–10.4)
CHLORIDE SERPL-SCNC: 105 MMOL/L (ref 98–107)
CREAT SERPL-MCNC: 0.97 MG/DL (ref 0.67–1.17)
EGFRCR SERPLBLD CKD-EPI 2021: 84 ML/MIN/1.73M2
EOSINOPHIL # BLD AUTO: 0.4 10E3/UL (ref 0–0.7)
EOSINOPHIL NFR BLD AUTO: 10 %
ERYTHROCYTE [DISTWIDTH] IN BLOOD BY AUTOMATED COUNT: 13.3 % (ref 10–15)
GLUCOSE SERPL-MCNC: 81 MG/DL (ref 70–99)
HCO3 SERPL-SCNC: 25 MMOL/L (ref 22–29)
HCT VFR BLD AUTO: 37.6 % (ref 40–53)
HGB BLD-MCNC: 12.6 G/DL (ref 13.3–17.7)
IMM GRANULOCYTES # BLD: 0 10E3/UL
IMM GRANULOCYTES NFR BLD: 0 %
LYMPHOCYTES # BLD AUTO: 0.9 10E3/UL (ref 0.8–5.3)
LYMPHOCYTES NFR BLD AUTO: 22 %
MCH RBC QN AUTO: 31.7 PG (ref 26.5–33)
MCHC RBC AUTO-ENTMCNC: 33.5 G/DL (ref 31.5–36.5)
MCV RBC AUTO: 95 FL (ref 78–100)
MONOCYTES # BLD AUTO: 0.3 10E3/UL (ref 0–1.3)
MONOCYTES NFR BLD AUTO: 8 %
NEUTROPHILS # BLD AUTO: 2.3 10E3/UL (ref 1.6–8.3)
NEUTROPHILS NFR BLD AUTO: 59 %
NRBC # BLD AUTO: 0 10E3/UL
NRBC BLD AUTO-RTO: 0 /100
PLATELET # BLD AUTO: 182 10E3/UL (ref 150–450)
POTASSIUM SERPL-SCNC: 3.7 MMOL/L (ref 3.4–5.3)
PROT SERPL-MCNC: 6 G/DL (ref 6.4–8.3)
PSA SERPL DL<=0.01 NG/ML-MCNC: <0.01 NG/ML (ref 0–6.5)
RBC # BLD AUTO: 3.98 10E6/UL (ref 4.4–5.9)
SODIUM SERPL-SCNC: 140 MMOL/L (ref 135–145)
WBC # BLD AUTO: 3.9 10E3/UL (ref 4–11)

## 2025-08-04 PROCEDURE — 36415 COLL VENOUS BLD VENIPUNCTURE: CPT | Performed by: INTERNAL MEDICINE

## 2025-08-04 PROCEDURE — 84155 ASSAY OF PROTEIN SERUM: CPT | Performed by: INTERNAL MEDICINE

## 2025-08-04 PROCEDURE — 84153 ASSAY OF PSA TOTAL: CPT | Performed by: INTERNAL MEDICINE

## 2025-08-04 PROCEDURE — 99214 OFFICE O/P EST MOD 30 MIN: CPT | Performed by: INTERNAL MEDICINE

## 2025-08-04 PROCEDURE — 85004 AUTOMATED DIFF WBC COUNT: CPT | Performed by: INTERNAL MEDICINE

## 2025-08-04 PROCEDURE — G0463 HOSPITAL OUTPT CLINIC VISIT: HCPCS | Performed by: INTERNAL MEDICINE

## 2025-08-04 ASSESSMENT — PAIN SCALES - GENERAL: PAINLEVEL_OUTOF10: NO PAIN (0)

## 2025-08-13 DIAGNOSIS — C79.51 METASTASIS TO BONE (H): Primary | ICD-10-CM

## 2025-08-13 DIAGNOSIS — C61 PROSTATE CANCER (H): ICD-10-CM

## 2025-08-15 ENCOUNTER — ANCILLARY PROCEDURE (OUTPATIENT)
Dept: CARDIOLOGY | Facility: CLINIC | Age: 70
End: 2025-08-15
Attending: INTERNAL MEDICINE
Payer: MEDICARE

## 2025-08-15 DIAGNOSIS — Z95.810 ICD (IMPLANTABLE CARDIOVERTER-DEFIBRILLATOR) IN PLACE: ICD-10-CM

## 2025-08-15 DIAGNOSIS — I49.01 VF (VENTRICULAR FIBRILLATION) (H): ICD-10-CM

## 2025-08-15 LAB
MDC_IDC_EPISODE_DTM: NORMAL
MDC_IDC_EPISODE_ID: NORMAL
MDC_IDC_EPISODE_TYPE: NORMAL
MDC_IDC_LEAD_CONNECTION_STATUS: NORMAL
MDC_IDC_LEAD_IMPLANT_DT: NORMAL
MDC_IDC_LEAD_LOCATION: NORMAL
MDC_IDC_LEAD_LOCATION_DETAIL_1: NORMAL
MDC_IDC_LEAD_MFG: NORMAL
MDC_IDC_LEAD_MODEL: NORMAL
MDC_IDC_LEAD_POLARITY_TYPE: NORMAL
MDC_IDC_LEAD_SERIAL: NORMAL
MDC_IDC_MSMT_BATTERY_DTM: NORMAL
MDC_IDC_MSMT_BATTERY_REMAINING_LONGEVITY: 156 MO
MDC_IDC_MSMT_BATTERY_REMAINING_PERCENTAGE: 100 %
MDC_IDC_MSMT_BATTERY_STATUS: NORMAL
MDC_IDC_MSMT_CAP_CHARGE_DTM: NORMAL
MDC_IDC_MSMT_CAP_CHARGE_TIME: 10.2 S
MDC_IDC_MSMT_CAP_CHARGE_TYPE: NORMAL
MDC_IDC_MSMT_LEADCHNL_RV_IMPEDANCE_VALUE: 728 OHM
MDC_IDC_PG_IMPLANT_DTM: NORMAL
MDC_IDC_PG_MFG: NORMAL
MDC_IDC_PG_MODEL: NORMAL
MDC_IDC_PG_SERIAL: NORMAL
MDC_IDC_PG_TYPE: NORMAL
MDC_IDC_SESS_CLINIC_NAME: NORMAL
MDC_IDC_SESS_DTM: NORMAL
MDC_IDC_SESS_TYPE: NORMAL
MDC_IDC_SET_BRADY_LOWRATE: 40 {BEATS}/MIN
MDC_IDC_SET_BRADY_MODE: NORMAL
MDC_IDC_SET_LEADCHNL_RV_PACING_AMPLITUDE: 2.8 V
MDC_IDC_SET_LEADCHNL_RV_PACING_CAPTURE_MODE: NORMAL
MDC_IDC_SET_LEADCHNL_RV_PACING_POLARITY: NORMAL
MDC_IDC_SET_LEADCHNL_RV_PACING_PULSEWIDTH: 1 MS
MDC_IDC_SET_LEADCHNL_RV_SENSING_ADAPTATION_MODE: NORMAL
MDC_IDC_SET_LEADCHNL_RV_SENSING_POLARITY: NORMAL
MDC_IDC_SET_LEADCHNL_RV_SENSING_SENSITIVITY: 0.6 MV
MDC_IDC_SET_ZONE_DETECTION_INTERVAL: 250 MS
MDC_IDC_SET_ZONE_DETECTION_INTERVAL: 324 MS
MDC_IDC_SET_ZONE_STATUS: NORMAL
MDC_IDC_SET_ZONE_STATUS: NORMAL
MDC_IDC_SET_ZONE_TYPE: NORMAL
MDC_IDC_SET_ZONE_TYPE: NORMAL
MDC_IDC_SET_ZONE_VENDOR_TYPE: NORMAL
MDC_IDC_SET_ZONE_VENDOR_TYPE: NORMAL
MDC_IDC_STAT_BRADY_DTM_END: NORMAL
MDC_IDC_STAT_BRADY_DTM_START: NORMAL
MDC_IDC_STAT_BRADY_RV_PERCENT_PACED: 0 %
MDC_IDC_STAT_EPISODE_RECENT_COUNT: 0
MDC_IDC_STAT_EPISODE_RECENT_COUNT_DTM_END: NORMAL
MDC_IDC_STAT_EPISODE_RECENT_COUNT_DTM_START: NORMAL
MDC_IDC_STAT_EPISODE_TYPE: NORMAL
MDC_IDC_STAT_EPISODE_VENDOR_TYPE: NORMAL
MDC_IDC_STAT_TACHYTHERAPY_ATP_DELIVERED_RECENT: 0
MDC_IDC_STAT_TACHYTHERAPY_ATP_DELIVERED_TOTAL: 0
MDC_IDC_STAT_TACHYTHERAPY_RECENT_DTM_END: NORMAL
MDC_IDC_STAT_TACHYTHERAPY_RECENT_DTM_START: NORMAL
MDC_IDC_STAT_TACHYTHERAPY_SHOCKS_ABORTED_RECENT: 0
MDC_IDC_STAT_TACHYTHERAPY_SHOCKS_ABORTED_TOTAL: 0
MDC_IDC_STAT_TACHYTHERAPY_SHOCKS_DELIVERED_RECENT: 0
MDC_IDC_STAT_TACHYTHERAPY_SHOCKS_DELIVERED_TOTAL: 0
MDC_IDC_STAT_TACHYTHERAPY_TOTAL_DTM_END: NORMAL
MDC_IDC_STAT_TACHYTHERAPY_TOTAL_DTM_START: NORMAL

## 2025-08-15 PROCEDURE — 93295 DEV INTERROG REMOTE 1/2/MLT: CPT | Performed by: INTERNAL MEDICINE

## 2025-08-15 PROCEDURE — 93296 REM INTERROG EVL PM/IDS: CPT | Performed by: INTERNAL MEDICINE

## (undated) DEVICE — SUCTION IRR STRYKERFLOW II W/TIP 250-070-520

## (undated) DEVICE — LINEN GOWN XLG 5407

## (undated) DEVICE — ESU LIGASURE LAPAROSCOPIC BLUNT TIP SEALER 5MMX37CM LF1837

## (undated) DEVICE — TUBING SMOKE EVAC 3/8"X10' 0702-045-023

## (undated) DEVICE — ANTIFOG SOLUTION W/FOAM PAD 31142527

## (undated) DEVICE — SU VICRYL 0 CT-2 27" J334H

## (undated) DEVICE — LINEN TOWEL PACK X5 5464

## (undated) DEVICE — STPL RELOAD REG TISSUE ECHELON 45 X 3.6MM BLUE GST45B

## (undated) DEVICE — SU VICRYL 0 UR-6 27" J603H

## (undated) DEVICE — ENDO TROCAR FIRST ENTRY KII FIOS ADV FIX 05X100MM CFF03

## (undated) DEVICE — DRSG STERI STRIP 1/2X4" R1547

## (undated) DEVICE — NDL INSUFFLATION 13GA 120MM C2201

## (undated) DEVICE — SOL WATER IRRIG 1000ML BOTTLE 2F7114

## (undated) DEVICE — SURGICEL POWDER ABSORBABLE HEMOSTAT 3GM 3013SP

## (undated) DEVICE — ESU BLADE PEAK PLASMA 3.0S PS210-030S

## (undated) DEVICE — SUCTION MANIFOLD NEPTUNE 2 SYS 4 PORT 0702-020-000

## (undated) DEVICE — CUSTOM PACK PACER ICD SAN5BPCHEA

## (undated) DEVICE — ENDO POUCH UNIV RETRIEVAL SYSTEM INZII 10MM CD001

## (undated) DEVICE — ESU GROUND PAD ADULT W/CORD E7507

## (undated) DEVICE — STPL POWERED ECHELON 45MM PSEE45A

## (undated) DEVICE — ESU ENDO SCISSORS 5MM CVD 5DCS

## (undated) DEVICE — ENDO TROCAR FIRST ENTRY KII FIOS Z-THRD 12X100MM CTF73

## (undated) DEVICE — SYR 10ML LL W/O NDL 302995

## (undated) DEVICE — PREP CHLORAPREP 26ML TINTED HI-LITE ORANGE 930815

## (undated) DEVICE — Device

## (undated) DEVICE — SU MONOCRYL 4-0 PS-2 27" UND Y426H

## (undated) DEVICE — ELECTRODE DEFIB CADENCE 22550R

## (undated) DEVICE — LINEN TOWEL PACK X6 WHITE 5487

## (undated) DEVICE — TUBING SMOKE EVAC PNEUMOCLEAR HIGH FLOW 0620050250

## (undated) RX ORDER — FENTANYL CITRATE 50 UG/ML
INJECTION, SOLUTION INTRAMUSCULAR; INTRAVENOUS
Status: DISPENSED
Start: 2023-01-29

## (undated) RX ORDER — PROPOFOL 10 MG/ML
INJECTION, EMULSION INTRAVENOUS
Status: DISPENSED
Start: 2025-01-23

## (undated) RX ORDER — HYDROMORPHONE HYDROCHLORIDE 1 MG/ML
INJECTION, SOLUTION INTRAMUSCULAR; INTRAVENOUS; SUBCUTANEOUS
Status: DISPENSED
Start: 2023-01-29

## (undated) RX ORDER — FENTANYL CITRATE-0.9 % NACL/PF 10 MCG/ML
PLASTIC BAG, INJECTION (ML) INTRAVENOUS
Status: DISPENSED
Start: 2025-01-23

## (undated) RX ORDER — VANCOMYCIN HYDROCHLORIDE 1 G/200ML
INJECTION, SOLUTION INTRAVENOUS
Status: DISPENSED
Start: 2025-01-23

## (undated) RX ORDER — METOPROLOL TARTRATE 25 MG/1
TABLET, FILM COATED ORAL
Status: DISPENSED
Start: 2023-01-29

## (undated) RX ORDER — ACETAMINOPHEN 325 MG/1
TABLET ORAL
Status: DISPENSED
Start: 2025-01-23

## (undated) RX ORDER — ONDANSETRON 2 MG/ML
INJECTION INTRAMUSCULAR; INTRAVENOUS
Status: DISPENSED
Start: 2025-01-23

## (undated) RX ORDER — EPHEDRINE SULFATE 50 MG/ML
INJECTION, SOLUTION INTRAMUSCULAR; INTRAVENOUS; SUBCUTANEOUS
Status: DISPENSED
Start: 2023-01-29

## (undated) RX ORDER — LIDOCAINE HYDROCHLORIDE 10 MG/ML
INJECTION, SOLUTION EPIDURAL; INFILTRATION; INTRACAUDAL; PERINEURAL
Status: DISPENSED
Start: 2025-01-23

## (undated) RX ORDER — BUPIVACAINE HYDROCHLORIDE 5 MG/ML
INJECTION, SOLUTION EPIDURAL; INTRACAUDAL
Status: DISPENSED
Start: 2023-01-29

## (undated) RX ORDER — ACETAMINOPHEN 325 MG/1
TABLET ORAL
Status: DISPENSED
Start: 2023-01-29